# Patient Record
Sex: MALE | Race: WHITE | NOT HISPANIC OR LATINO | Employment: OTHER | URBAN - METROPOLITAN AREA
[De-identification: names, ages, dates, MRNs, and addresses within clinical notes are randomized per-mention and may not be internally consistent; named-entity substitution may affect disease eponyms.]

---

## 2017-02-27 ENCOUNTER — OFFICE VISIT (OUTPATIENT)
Dept: INTERNAL MEDICINE | Facility: IMAGING CENTER | Age: 62
End: 2017-02-27
Payer: COMMERCIAL

## 2017-02-27 VITALS
TEMPERATURE: 98.4 F | DIASTOLIC BLOOD PRESSURE: 66 MMHG | HEIGHT: 69 IN | SYSTOLIC BLOOD PRESSURE: 120 MMHG | HEART RATE: 61 BPM | WEIGHT: 169 LBS | BODY MASS INDEX: 25.03 KG/M2 | RESPIRATION RATE: 14 BRPM | OXYGEN SATURATION: 97 %

## 2017-02-27 DIAGNOSIS — Z00.00 WELL ADULT EXAM: ICD-10-CM

## 2017-02-27 DIAGNOSIS — E06.3 AUTOIMMUNE HYPOTHYROIDISM: ICD-10-CM

## 2017-02-27 DIAGNOSIS — I25.10 CORONARY ATHEROSCLEROSIS DUE TO CALCIFIED CORONARY LESION: Primary | ICD-10-CM

## 2017-02-27 DIAGNOSIS — R93.1 AGATSTON CAC SCORE 100-199: ICD-10-CM

## 2017-02-27 DIAGNOSIS — E78.2 MIXED HYPERLIPIDEMIA WITH APOLIPOPROTEIN E3 VARIANT: ICD-10-CM

## 2017-02-27 DIAGNOSIS — I25.84 CORONARY ATHEROSCLEROSIS DUE TO CALCIFIED CORONARY LESION: Primary | ICD-10-CM

## 2017-02-27 DIAGNOSIS — E55.9 VITAMIN D DEFICIENCY: ICD-10-CM

## 2017-02-27 DIAGNOSIS — N52.8 OTHER MALE ERECTILE DYSFUNCTION: ICD-10-CM

## 2017-02-27 DIAGNOSIS — I10 ESSENTIAL HYPERTENSION: ICD-10-CM

## 2017-02-27 PROCEDURE — 99396 PREV VISIT EST AGE 40-64: CPT | Performed by: FAMILY MEDICINE

## 2017-02-27 RX ORDER — TADALAFIL 5 MG/1
5 TABLET ORAL PRN
Qty: 30 TAB | Refills: 12 | Status: SHIPPED | OUTPATIENT
Start: 2017-02-27 | End: 2017-08-04

## 2017-02-27 NOTE — MR AVS SNAPSHOT
"        Adal Dobbins   2017 1:30 PM   Office Visit   MRN: [id number]    Department:  [facility]   Dept Phone:  [phone]    Description:  Male : 1955   Provider:  Wade Urias M.D.           Reason for Visit     Hypertension     Hyperlipidemia     Coronary Artery Disease Subclinical    Medication Refill     Flu Vaccine           Allergies as of 2017     No Known Allergies      You were diagnosed with     Coronary atherosclerosis due to calcified coronary lesion   [397018]  -  Primary     Agatston CAC score 100-199   [8298430]       Other male erectile dysfunction   [4647908]       Essential hypertension   [6595409]       Mixed hyperlipidemia with apolipoprotein E3 variant   [219290]       Autoimmune hypothyroidism   [2058320]       Vitamin D deficiency   [1154066]       Well adult exam   [582227]         Vital Signs     Blood Pressure Pulse Temperature Respirations Height Weight    120/66 mmHg 61 36.9 °C (98.4 °F) 14 1.74 m (5' 8.5\") 76.658 kg (169 lb)    Body Mass Index Oxygen Saturation Smoking Status             25.32 kg/m2 97% Never Smoker          Basic Information     Date Of Birth Sex Race Ethnicity Preferred Language    1955 Male White Non- English      Your appointments     Mar 08, 2017  8:30 AM   Non Provider 1 with Su Smith R.N.   [facility] (--)    [address] [id number] [phone]           You will be receiving a confirmation call a few days before your appointment from our automated call confirmation system.              Problem List              ICD-10-CM Priority Class Noted - Resolved    ASTHMA    Unknown - Present    Psoriasis L40.9   Unknown - Present    Family history of hypertension Z82.49   Unknown - Present    History of stroke Z86.73   Unknown - Present    Mixed hyperlipidemia with apolipoprotein E3 variant E78.2   10/9/2013 - Present    Coronary atherosclerosis due to calcified " coronary lesion I25.10, I25.84   2/14/2014 - Present    Erectile dysfunction N52.9   9/18/2014 - Present    Elevated TSH R94.6   9/18/2014 - Present    Elevated CPK R74.8   9/18/2014 - Present    Elevated LFTs R79.89   9/18/2014 - Present    Autoimmune hypothyroidism E03.9   7/1/2016 - Present    Agatston CAC score 100-199 R93.1   2/27/2017 - Present    Essential hypertension I10   2/27/2017 - Present    Vitamin D deficiency E55.9   2/27/2017 - Present      Health Maintenance        Date Due Completion Dates    IMM PNEUMOCOCCAL 19-64 (ADULT) MEDIUM RISK SERIES (1 of 1 - PPSV23) 3/29/1976 ---    IMM INFLUENZA (1) 9/1/2016 11/3/2015, 11/12/2014    COLONOSCOPY 9/13/2017 9/13/2007 (Done)    Override on 9/13/2007: Done    IMM DTaP/Tdap/Td Vaccine (2 - Td) 5/1/2022 5/1/2012            Current Immunizations     Influenza Vaccine Quad Inj (Pf) 11/3/2015, 11/12/2014    SHINGLES VACCINE 9/13/2013  3:06 PM    Tdap Vaccine 5/1/2012      Below and/or attached are the medications your provider expects you to take. Review all of your home medications and newly ordered medications with your provider and/or pharmacist. Follow medication instructions as directed by your provider and/or pharmacist. Please keep your medication list with you and share with your provider. Update the information when medications are discontinued, doses are changed, or new medications (including over-the-counter products) are added; and carry medication information at all times in the event of emergency situations     Allergies:  No Known Allergies          Medications  Valid as of: February 27, 2017 -  4:00 PM    Generic Name Brand Name Tablet Size Instructions for use    ALPRAZolam (Tab) XANAX 0.5 MG Take 1 Tab by mouth at bedtime as needed for Sleep.        Aspirin (Tablet Delayed Response) ECOTRIN 81 MG Take 2 Tabs by mouth every day.        Benazepril HCl (Tab) LOTENSIN 20 MG TAKE ONE TABLET BY MOUTH EVERY DAY        Calcipotriene (Cream) DOVONEX  0.005 % Apply 2 g to affected area(s) 2 times a day.        Calcipotriene (Cream) DOVONEX 0.005 % APPLY 2 GRAMS TO AFFECTED AREA(S) TWICE DAILY        Cholecalciferol (Cap) Vitamin D 2000 UNITS Take 1 Cap by mouth every day.        Levothyroxine Sodium (Tab) SYNTHROID 75 MCG Take 1 Tab by mouth Every morning on an empty stomach.        Simvastatin (Tab) ZOCOR 40 MG TAKE ONE-HALF TABLET BY MOUTH EVERY DAY        Tadalafil (Tab) CIALIS 5 MG Take 1 Tab by mouth as needed for Erectile Dysfunction.        Tadalafil (Tab) CIALIS 5 MG Take 1 Tab by mouth as needed for Erectile Dysfunction.        Vardenafil HCl (Tab) LEVITRA 5 MG Take 1 Tab by mouth as needed.        .                 Medicines prescribed today were sent to:     [facility] [address] [place] [address] [facility] [address] [place] [id number] [facility] [address]    Phone: [phone] Fax: [phone]    Open 24 Hours?: No      Medication refill instructions:       If your prescription bottle indicates you have medication refills left, it is not necessary to call your provider’s office. Please contact your pharmacy and they will refill your medication.    If your prescription bottle indicates you do not have any refills left, you may request refills at any time through one of the following ways: The online [employer] system (except Urgent Care), by calling your provider’s office, or by asking your pharmacy to contact your provider’s office with a refill request. Medication refills are processed only during regular business hours and may not be available until the next business day. Your provider may request additional information or to have a follow-up visit with you prior to refilling your medication.   *Please Note: Medication refills are assigned a new Rx number when refilled electronically. Your pharmacy may indicate that no refills were authorized even though a new prescription for the same medication is available  at the pharmacy. Please request the medicine by name with the pharmacy before contacting your provider for a refill.        Your To Do List     Future Labs/Procedures Complete By Expires    APOLIPOPROTEIN B  As directed 2/27/2018    CBC WITH DIFFERENTIAL  As directed 2/27/2018    COMP METABOLIC PANEL  As directed 2/27/2018    CRP HIGH SENSITIVE (CARDIAC)  As directed 2/27/2018    FREE THYROXINE  As directed 2/27/2018    LIPOPROTEIN QT BLOOD BY NMR  As directed 2/28/2018    MICROALBUMIN CREAT RATIO URINE  As directed 2/28/2018    PROSTATE SPECIFIC AG SCREENING  As directed 2/27/2018    TOTAL VASCULAR SCREENING  As directed 8/27/2017    TSH  As directed 2/27/2018    VITAMIN D,25 HYDROXY  As directed 2/28/2018      Instructions    [facility] Outpatient [facility]   Medication Sig Dispense Refill   • tadalafil (CIALIS) 5 MG tablet Take 1 Tab by mouth as needed for Erectile Dysfunction. 30 Tab 12   • benazepril (LOTENSIN) 20 MG Tab TAKE ONE TABLET BY MOUTH EVERY DAY 90 Tab 0   • simvastatin (ZOCOR) 40 MG Tab TAKE ONE-HALF TABLET BY MOUTH EVERY DAY 90 Tab 0   • levothyroxine (SYNTHROID) 75 MCG Tab Take 1 Tab by mouth Every morning on an empty stomach. 90 Tab 3   • aspirin EC (ECOTRIN) 81 MG Tablet Delayed Response Take 2 Tabs by mouth every day.     • calcipotriene (DOVONEX) 0.005 % Cream APPLY 2 GRAMS TO AFFECTED AREA(S) TWICE DAILY 120 g 5   • tadalafil (CIALIS) 5 MG tablet Take 1 Tab by mouth as needed for Erectile Dysfunction. 30 Tab 12   • Cholecalciferol (VITAMIN D) 2000 UNITS Cap Take 1 Cap by mouth every day.     • calcipotriene (DOVONEX) 0.005 % Cream Apply 2 g to affected area(s) 2 times a day. 120 g 5   • vardenafil (LEVITRA) 5 MG tablet Take 1 Tab by mouth as needed. 10 Tab 12   • alprazolam (XANAX) 0.5 MG Tab Take 1 Tab by mouth at bedtime as needed for Sleep. 30 Tab 1     No current Epic-ordered facility-administered medications on file.              [email or web address] Access Code: Activation code not  generated  Current MyChart Status: Active

## 2017-02-27 NOTE — PATIENT INSTRUCTIONS
Current Outpatient Prescriptions Ordered in [facility]   Medication Sig Dispense Refill   • tadalafil (CIALIS) 5 MG tablet Take 1 Tab by mouth as needed for Erectile Dysfunction. 30 Tab 12   • benazepril (LOTENSIN) 20 MG Tab TAKE ONE TABLET BY MOUTH EVERY DAY 90 Tab 0   • simvastatin (ZOCOR) 40 MG Tab TAKE ONE-HALF TABLET BY MOUTH EVERY DAY 90 Tab 0   • levothyroxine (SYNTHROID) 75 MCG Tab Take 1 Tab by mouth Every morning on an empty stomach. 90 Tab 3   • aspirin EC (ECOTRIN) 81 MG Tablet Delayed Response Take 2 Tabs by mouth every day.     • calcipotriene (DOVONEX) 0.005 % Cream APPLY 2 GRAMS TO AFFECTED AREA(S) TWICE DAILY 120 g 5   • tadalafil (CIALIS) 5 MG tablet Take 1 Tab by mouth as needed for Erectile Dysfunction. 30 Tab 12   • Cholecalciferol (VITAMIN D) 2000 UNITS Cap Take 1 Cap by mouth every day.     • calcipotriene (DOVONEX) 0.005 % Cream Apply 2 g to affected area(s) 2 times a day. 120 g 5   • vardenafil (LEVITRA) 5 MG tablet Take 1 Tab by mouth as needed. 10 Tab 12   • alprazolam (XANAX) 0.5 MG Tab Take 1 Tab by mouth at bedtime as needed for Sleep. 30 Tab 1     No current Epic-ordered facility-administered medications on file.

## 2017-02-27 NOTE — PROGRESS NOTES
SUBJECTIVE:    Chief Complaint   Patient presents with   • Hypertension   • Hyperlipidemia   • Coronary Artery Disease     Subclinical   • Medication Refill   • Flu Vaccine       Adal Dobbins is a 61 y.o. male,   Established Patient   PROBLEM #1-HISTORY OF PRESENT ILLNESS  Existing Problem, but requiring re-evaluation  PATIENT STATEMENT OF PROBLEM - Subclinical CAD, Dyslipidemia, HTN  ONSET - years  COURSE - asymptomatic. No CVD/CeVD event history. Doing very well clinically. Normal weight. Normal BP on rx. Due for labs and TVS w/ cIMT.   INTENSITY/STATUS/LOCATION/RADIATION - stable  AGGRAVATORS - Multifactorial   RELIEVERS - meds and Therapeutic Lifestyle Changes   TREATMENTS/COMPLIANCE/@GOAL? - same/ good/ clinically yes    PROBLEM #2-HISTORY OF PRESENT ILLNESS  New Problem  PATIENT STATEMENT OF PROBLEM - Due for refill of cialis.     PROBLEM #3-HISTORY OF PRESENT ILLNESS  New Problem  PATIENT STATEMENT OF PROBLEM - Due for annual FLU vaccine.   ONSET - counseled.     No Known Allergies    Patient Active Problem List    Diagnosis Date Noted   • Agatston CAC score 100-199 02/27/2017   • Essential hypertension 02/27/2017   • Vitamin D deficiency 02/27/2017   • Autoimmune hypothyroidism 07/01/2016   • Erectile dysfunction 09/18/2014   • Elevated TSH 09/18/2014   • Elevated CPK 09/18/2014   • Elevated LFTs 09/18/2014   • Coronary atherosclerosis due to calcified coronary lesion 02/14/2014   • Mixed hyperlipidemia with apolipoprotein E3 variant 10/09/2013   • ASTHMA    • Psoriasis    • Family history of hypertension    • History of stroke        Outpatient Encounter Prescriptions as of 2/27/2017   Medication Sig Dispense Refill   • tadalafil (CIALIS) 5 MG tablet Take 1 Tab by mouth as needed for Erectile Dysfunction. 30 Tab 12   • benazepril (LOTENSIN) 20 MG Tab TAKE ONE TABLET BY MOUTH EVERY DAY 90 Tab 0   • simvastatin (ZOCOR) 40 MG Tab TAKE ONE-HALF TABLET BY MOUTH EVERY DAY 90 Tab 0   • levothyroxine (SYNTHROID) 75  "MCG Tab Take 1 Tab by mouth Every morning on an empty stomach. 90 Tab 3   • aspirin EC (ECOTRIN) 81 MG Tablet Delayed Response Take 2 Tabs by mouth every day.     • calcipotriene (DOVONEX) 0.005 % Cream APPLY 2 GRAMS TO AFFECTED AREA(S) TWICE DAILY 120 g 5   • tadalafil (CIALIS) 5 MG tablet Take 1 Tab by mouth as needed for Erectile Dysfunction. 30 Tab 12   • Cholecalciferol (VITAMIN D) 2000 UNITS Cap Take 1 Cap by mouth every day.     • calcipotriene (DOVONEX) 0.005 % Cream Apply 2 g to affected area(s) 2 times a day. 120 g 5   • vardenafil (LEVITRA) 5 MG tablet Take 1 Tab by mouth as needed. 10 Tab 12   • alprazolam (XANAX) 0.5 MG Tab Take 1 Tab by mouth at bedtime as needed for Sleep. 30 Tab 1   • [DISCONTINUED] scopolamine (TRANSDERM-SCOP) 1.5 MG PT72 Apply 1 Patch to skin as directed every 72 hours. 4 Patch 3     No facility-administered encounter medications on file as of 2/27/2017.       Social History   Substance Use Topics   • Smoking status: Never Smoker    • Smokeless tobacco: Never Used   • Alcohol Use: Not on file       Family History   Problem Relation Age of Onset   • Hypertension Mother    • Other Father 45     Viral encephalitis       Patient's Past, Social, and Family History reviewed and updated by me in EPIC today.    REVIEW OF SYMPTOMS:               Pertinent Positives as above.    All other systems reviewed and negative.     OBJECTIVE:    /66 mmHg  Pulse 61  Temp(Src) 36.9 °C (98.4 °F)  Resp 14  Ht 1.74 m (5' 8.5\")  Wt 76.658 kg (169 lb)  BMI 25.32 kg/m2  SpO2 97%  Body mass index is 25.32 kg/(m^2).    Well developed, well nourished male, no acute distress, non-ill appearing. Comfortable, appears stated age, pleasant and cooperative  HEAD: atraumatic, normocephalic   EYES: Conjunctiva normal, EOMI, PERRLA, acuity grossly intact.   EARS/NOSE/THROAT: TM's normal, no SSX of infection, no perforation, no hemotympanum, acuity grossly intact. Oropharynx: benign, no lesions noted. Nares: " benign.   NECK: supple, no adenopathy, no thyromegaly or nodules, no JVD, no carotid bruits.   CHEST/LUNGS: clear to auscultation and percussion bilaterally. No adventitious breath sounds.   CARDIOVASCULAR: regular rate and rhythm, no murmur. PMI not displaced. Good central and peripheral pulses.   BACK: no CVA tenderness.   ABDOMEN: soft, non-tender, non-distended, no masses, no hepatosplenomegaly. Normal active bowel tones.   : deferred.   Rectal: deferred.   Extremities: warm/well-perfused, no cyanosis, clubbing, or edema.   SKIN: clear, unbroken, no rashes, normal turgor.   Neuro: Mental Status: Alert and Oriented x 3. CN II-XII grossly intact. Gait normal. Non-focal, intact. Normal strength, sensation    ASSESSMENT:    1. Coronary atherosclerosis due to calcified coronary lesion  TOTAL VASCULAR SCREENING   2. Agatston CAC score 100-199  TOTAL VASCULAR SCREENING   3. Other male erectile dysfunction  tadalafil (CIALIS) 5 MG tablet   4. Essential hypertension     5. Mixed hyperlipidemia with apolipoprotein E3 variant  LIPOPROTEIN QT BLOOD BY NMR    CBC WITH DIFFERENTIAL    COMP METABOLIC PANEL    MICROALBUMIN CREAT RATIO URINE    CRP HIGH SENSITIVE (CARDIAC)    APOLIPOPROTEIN B   6. Autoimmune hypothyroidism  TSH    FREE THYROXINE   7. Vitamin D deficiency  VITAMIN D,25 HYDROXY   8. Well adult exam  PROSTATE SPECIFIC AG SCREENING       PLAN:    Total Face-to-Face time spent with patient: 30 minutes  Amount of time spent counseling patient and/or coordinating care: 20 minutes    The nature of patient counseling as below:  -Patient Education, including below topics:  -Differential Diagnoses and treatment options discussed  -Risks, benefits, alternatives discussed  -Health Maintenance Exam issues discussed  -Exercise  -Dietary recommendations discussed  -Therapeutic Lifestyle Changes discussed    The nature of coordination of care as below:  -Medications added: none. Refills as above  -Medications discontinued:  none  -Medications adjusted: none  -Continue (other) present chronic medications  -Labs: D/NMR/TSH/FreeT4/cbc/cmp/uacr/psa/hsCRP/apoB  -Referrals: none  -Other: TVS w/ cIMT   FLU vaccine @ pharmacy  -Seek medical attention immediately if worse    FOLLOW-UP:  -in 6 months  -and sooner if test/consult results warrant  And for Health Care Maintenance Exams  And as needed.

## 2017-03-20 RX ORDER — BENAZEPRIL HYDROCHLORIDE 20 MG/1
TABLET ORAL
Qty: 90 TAB | Refills: 3 | Status: SHIPPED | OUTPATIENT
Start: 2017-03-20 | End: 2018-03-17 | Stop reason: SDUPTHER

## 2017-03-22 ENCOUNTER — HOSPITAL ENCOUNTER (OUTPATIENT)
Facility: MEDICAL CENTER | Age: 62
End: 2017-03-22
Attending: FAMILY MEDICINE
Payer: COMMERCIAL

## 2017-03-22 ENCOUNTER — NON-PROVIDER VISIT (OUTPATIENT)
Dept: INTERNAL MEDICINE | Facility: IMAGING CENTER | Age: 62
End: 2017-03-22
Payer: COMMERCIAL

## 2017-03-22 DIAGNOSIS — E06.3 AUTOIMMUNE HYPOTHYROIDISM: ICD-10-CM

## 2017-03-22 DIAGNOSIS — E78.2 MIXED HYPERLIPIDEMIA WITH APOLIPOPROTEIN E3 VARIANT: ICD-10-CM

## 2017-03-22 DIAGNOSIS — E55.9 VITAMIN D DEFICIENCY: ICD-10-CM

## 2017-03-22 DIAGNOSIS — Z01.89 ENCOUNTER FOR ROUTINE LABORATORY TESTING: Primary | ICD-10-CM

## 2017-03-22 DIAGNOSIS — Z00.00 WELL ADULT EXAM: ICD-10-CM

## 2017-03-22 LAB
25(OH)D3 SERPL-MCNC: 28 NG/ML (ref 30–100)
ALBUMIN SERPL BCP-MCNC: 4 G/DL (ref 3.2–4.9)
ALBUMIN/GLOB SERPL: 1.2 G/DL
ALP SERPL-CCNC: 41 U/L (ref 30–99)
ALT SERPL-CCNC: 25 U/L (ref 2–50)
ANION GAP SERPL CALC-SCNC: 10 MMOL/L (ref 0–11.9)
AST SERPL-CCNC: 32 U/L (ref 12–45)
BASOPHILS # BLD AUTO: 0.07 K/UL (ref 0–0.12)
BASOPHILS NFR BLD AUTO: 1.4 % (ref 0–1.8)
BILIRUB SERPL-MCNC: 0.6 MG/DL (ref 0.1–1.5)
BUN SERPL-MCNC: 23 MG/DL (ref 8–22)
CALCIUM SERPL-MCNC: 9.5 MG/DL (ref 8.5–10.5)
CHLORIDE SERPL-SCNC: 106 MMOL/L (ref 96–112)
CO2 SERPL-SCNC: 25 MMOL/L (ref 20–33)
CREAT SERPL-MCNC: 0.9 MG/DL (ref 0.5–1.4)
CREAT UR-MCNC: 104.7 MG/DL
CRP SERPL HS-MCNC: 0.9 MG/L (ref 0–7.5)
EOSINOPHIL # BLD: 0.24 K/UL (ref 0–0.51)
EOSINOPHIL NFR BLD AUTO: 4.7 % (ref 0–6.9)
ERYTHROCYTE [DISTWIDTH] IN BLOOD BY AUTOMATED COUNT: 41.6 FL (ref 35.9–50)
GLOBULIN SER CALC-MCNC: 3.3 G/DL (ref 1.9–3.5)
GLUCOSE SERPL-MCNC: 90 MG/DL (ref 65–99)
HCT VFR BLD AUTO: 45.9 % (ref 42–52)
HGB BLD-MCNC: 15.3 G/DL (ref 14–18)
IMM GRANULOCYTES # BLD AUTO: 0.01 K/UL (ref 0–0.11)
IMM GRANULOCYTES NFR BLD AUTO: 0.2 % (ref 0–0.9)
LYMPHOCYTES # BLD: 1.86 K/UL (ref 1–4.8)
LYMPHOCYTES NFR BLD AUTO: 36 % (ref 22–41)
MCH RBC QN AUTO: 29.9 PG (ref 27–33)
MCHC RBC AUTO-ENTMCNC: 33.3 G/DL (ref 33.7–35.3)
MCV RBC AUTO: 89.6 FL (ref 81.4–97.8)
MICROALBUMIN UR-MCNC: <0.7 MG/DL
MICROALBUMIN/CREAT UR: NORMAL MG/G (ref 0–30)
MONOCYTES # BLD: 0.51 K/UL (ref 0–0.85)
MONOCYTES NFR BLD AUTO: 9.9 % (ref 0–13.4)
NEUTROPHILS # BLD: 2.47 K/UL (ref 1.82–7.42)
NEUTROPHILS NFR BLD AUTO: 47.8 % (ref 44–72)
NRBC # BLD AUTO: 0 K/UL
NRBC BLD-RTO: 0 /100 WBC
PLATELET # BLD AUTO: 291 K/UL (ref 164–446)
PMV BLD AUTO: 11.6 FL (ref 9–12.9)
POTASSIUM SERPL-SCNC: 4.4 MMOL/L (ref 3.6–5.5)
PROT SERPL-MCNC: 7.3 G/DL (ref 6–8.2)
PSA SERPL DL<=0.01 NG/ML-MCNC: 3.12 NG/ML (ref 0–4)
RBC # BLD AUTO: 5.12 M/UL (ref 4.7–6.1)
SODIUM SERPL-SCNC: 141 MMOL/L (ref 135–145)
T4 FREE SERPL-MCNC: 0.99 NG/DL (ref 0.53–1.43)
TSH SERPL DL<=0.005 MIU/L-ACNC: 2.95 UIU/ML (ref 0.3–3.7)
WBC # BLD AUTO: 5.2 K/UL (ref 4.8–10.8)

## 2017-03-22 PROCEDURE — 80061 LIPID PANEL: CPT

## 2017-03-22 PROCEDURE — 80053 COMPREHEN METABOLIC PANEL: CPT

## 2017-03-22 PROCEDURE — 82306 VITAMIN D 25 HYDROXY: CPT

## 2017-03-22 PROCEDURE — 82043 UR ALBUMIN QUANTITATIVE: CPT

## 2017-03-22 PROCEDURE — 82172 ASSAY OF APOLIPOPROTEIN: CPT

## 2017-03-22 PROCEDURE — 84153 ASSAY OF PSA TOTAL: CPT

## 2017-03-22 PROCEDURE — 84439 ASSAY OF FREE THYROXINE: CPT

## 2017-03-22 PROCEDURE — 84443 ASSAY THYROID STIM HORMONE: CPT

## 2017-03-22 PROCEDURE — 86141 C-REACTIVE PROTEIN HS: CPT

## 2017-03-22 PROCEDURE — 82570 ASSAY OF URINE CREATININE: CPT

## 2017-03-22 PROCEDURE — 83704 LIPOPROTEIN BLD QUAN PART: CPT

## 2017-03-22 PROCEDURE — 85025 COMPLETE CBC W/AUTO DIFF WBC: CPT

## 2017-03-24 LAB — APO B100 SERPL-MCNC: 101 MG/DL (ref 55–140)

## 2017-03-26 LAB
CHOLEST SERPL-MCNC: 194 MG/DL (ref 100–199)
HDL PARTICAL NO Q4363: 34.8 UMOL/L
HDL SERPL QN: 9 NM
HDLC SERPL-MCNC: 55 MG/DL
HLD.LARGE SERPL-SCNC: 6.3 UMOL/L
LDL MED SERPL QN: 21.1 NM
LDL SERPL QN: 21.1 NM
LDL SERPL-SCNC: 1749 NMOL/L
LDL SMALL SERPL-SCNC: 680 NMOL/L
LDL SMALL SERPL-SCNC: 680 NMOL/L
LDLC SERPL CALC-MCNC: 121 MG/DL (ref 0–99)
LP IR SCORE Q4364: 41
TRIGL SERPL-MCNC: 91 MG/DL (ref 0–149)
VLDL LARGE SERPL-SCNC: 1.1 NMOL/L
VLDL SERPL QN: 50.2 NM

## 2017-04-14 ENCOUNTER — HOSPITAL ENCOUNTER (OUTPATIENT)
Dept: RADIOLOGY | Facility: MEDICAL CENTER | Age: 62
End: 2017-04-14
Attending: FAMILY MEDICINE
Payer: COMMERCIAL

## 2017-04-14 DIAGNOSIS — I25.10 CORONARY ATHEROSCLEROSIS DUE TO CALCIFIED CORONARY LESION: ICD-10-CM

## 2017-04-14 DIAGNOSIS — I25.84 CORONARY ATHEROSCLEROSIS DUE TO CALCIFIED CORONARY LESION: ICD-10-CM

## 2017-04-14 DIAGNOSIS — R93.1 AGATSTON CAC SCORE 100-199: ICD-10-CM

## 2017-04-14 PROCEDURE — 306734 HCHG ADVANCED VASCULAR SCREENING

## 2017-04-19 PROBLEM — I65.23 ATHEROSCLEROSIS OF BOTH CAROTID ARTERIES: Status: ACTIVE | Noted: 2017-04-19

## 2017-04-19 PROBLEM — I70.0 ABDOMINAL AORTIC ATHEROSCLEROSIS (HCC): Status: ACTIVE | Noted: 2017-04-19

## 2017-07-11 ENCOUNTER — OFFICE VISIT (OUTPATIENT)
Dept: OTHER | Facility: MEDICAL CENTER | Age: 62
End: 2017-07-11
Payer: COMMERCIAL

## 2017-07-11 VITALS
HEART RATE: 55 BPM | BODY MASS INDEX: 25.91 KG/M2 | TEMPERATURE: 97.3 F | HEIGHT: 68 IN | WEIGHT: 171 LBS | SYSTOLIC BLOOD PRESSURE: 122 MMHG | RESPIRATION RATE: 14 BRPM | OXYGEN SATURATION: 97 % | DIASTOLIC BLOOD PRESSURE: 89 MMHG

## 2017-07-11 DIAGNOSIS — R13.10 DYSPHAGIA, UNSPECIFIED TYPE: Primary | ICD-10-CM

## 2017-07-11 DIAGNOSIS — F17.290 CIGAR SMOKER MOTIVATED TO QUIT: ICD-10-CM

## 2017-07-11 PROCEDURE — 99214 OFFICE O/P EST MOD 30 MIN: CPT | Performed by: FAMILY MEDICINE

## 2017-07-11 RX ORDER — ESOMEPRAZOLE MAGNESIUM 40 MG/1
40 CAPSULE, DELAYED RELEASE ORAL DAILY
Qty: 30 CAP | Refills: 3 | Status: SHIPPED | OUTPATIENT
Start: 2017-07-11 | End: 2017-11-14

## 2017-07-11 NOTE — Clinical Note
July 11, 2017         VIVIANE Hummel  [facility]  Fax: [phone]        Patient: Adal Dobbins   MR Number: [id number]   YOB: 1955   Date of Visit: 7/11/2017           Dear VIVIANE Hummel,    I am referring my patient, Adal Dobbins, to you for evaluation of Dysphagia of one month's duration.  Please see my attached progress note from today.  He has had 2 fairly severe episodes of dysphagia of food in the past month.  He has some PUD risk factors, but minimal if any heartburn symptoms chronically.  He is scheduled to see you on the morning of 7/26/17.    He  has a past medical history of HTN (hypertension) (2011); Stroke (CMS-HCC) (1997); ASTHMA; Psoriasis; History of stroke; and Family history of hypertension. His  has past surgical history that includes other (age 12 ). He  reports that he has been smoking Cigars.  He has never used smokeless tobacco. He reports that he drinks about 8.4 oz of alcohol per week. He reports that he does not use illicit drugs.    He has a current medication list which includes the following prescription(s): esomeprazole, calcipotriene, simvastatin, benazepril, tadalafil, tadalafil, levothyroxine, vitamin d, calcipotriene, vardenafil, aspirin ec, and alprazolam. He has No Known Allergies.    I appreciate your assistance in his care and look forward to your findings and recommendations.    Sincerely,                        Wade Urias M.D.

## 2017-07-11 NOTE — PATIENT INSTRUCTIONS
Current Outpatient Prescriptions Ordered in [facility]   Medication Sig Dispense Refill   • esomeprazole (NEXIUM) 40 MG delayed-release capsule Take 1 Cap by mouth every day. 30 Cap 3   • calcipotriene (DOVONEX) 0.005 % Cream APPLY 2 GRAMS EXTERNALLY TO THE AFFECTED AREA TWICE DAILY 120 g 0   • simvastatin (ZOCOR) 40 MG Tab TAKE 1/2 TABLET BY MOUTH EVERY DAY 90 Tab 4   • benazepril (LOTENSIN) 20 MG Tab TAKE 1 TABLET BY MOUTH EVERY DAY 90 Tab 3   • tadalafil (CIALIS) 5 MG tablet Take 1 Tab by mouth as needed for Erectile Dysfunction. 30 Tab 12   • tadalafil (CIALIS) 5 MG tablet Take 1 Tab by mouth as needed for Erectile Dysfunction. 30 Tab 12   • levothyroxine (SYNTHROID) 75 MCG Tab Take 1 Tab by mouth Every morning on an empty stomach. 90 Tab 3   • Cholecalciferol (VITAMIN D) 2000 UNITS Cap Take 1 Cap by mouth every day.     • calcipotriene (DOVONEX) 0.005 % Cream Apply 2 g to affected area(s) 2 times a day. 120 g 5   • vardenafil (LEVITRA) 5 MG tablet Take 1 Tab by mouth as needed. 10 Tab 12   • aspirin EC (ECOTRIN) 81 MG Tablet Delayed Response Take 2 Tabs by mouth every day.     • alprazolam (XANAX) 0.5 MG Tab Take 1 Tab by mouth at bedtime as needed for Sleep. 30 Tab 1     No current Epic-ordered facility-administered medications on file.

## 2017-07-11 NOTE — PROGRESS NOTES
"SUBJECTIVE:    Chief Complaint   Patient presents with   • Dysphagia       Adal Dobbins is a 62 y.o. male,   New Patient to my [facility] (but previously established at my [facility].)    PROBLEM #1-HISTORY OF PRESENT ILLNESS  New Problem  PATIENT STATEMENT OF PROBLEM - Dysphagia  ONSET - 1 month  COURSE - he ate beef/steak meal, in fairly big \"chunks\", and food got \"stuck\" in his esophagus, here at work.  He felt poorly until 20 seconds later when he coughed up the food item.  He feels this was more of an esophagus issue than a tracheal issue.  Felt fine, normal eating routine, until yesterday, again at hospital.  He peeled an orange, and ate a third of orange in one mouthful, and again food got \"stuck, and then 15 seconds later\" he coughed this up.  He felt some acidic \"heartburn\" subsequently.  No trouble since yesterday, never any trouble if he chews his food properly.  He may have some very intermittent mild \"heartburn\" over the years, but never significant enough to take anything or seek care.  PUD risk factors: he drinks 1-2 drinks of ETOH per day, one cigar per week, stress as a surgeon, 3-4 cups of caffieneated coffee per day, and 1 diet coke per day, and no known trigger foods.   INTENSITY/STATUS/LOCATION/RADIATION - severe during episodes/ fine presently  AGGRAVATORS - as above. GERD?  RELIEVERS - none  TREATMENTS/COMPLIANCE/@GOAL? - none/ na/ no     PROBLEM #2-HISTORY OF PRESENT ILLNESS  New Problem  PATIENT STATEMENT OF PROBLEM - It has been 10 years since first screening COLONOSCOPY, which was normal per patient.     No Known Allergies    Patient Active Problem List    Diagnosis Date Noted   • Abdominal aortic atherosclerosis (CMS-HCC) 04/19/2017   • Atherosclerosis of both carotid arteries, mild, per Ultrasound 04/19/2017   • Agatston CAC score 100-199 02/27/2017   • Essential hypertension 02/27/2017   • Vitamin D deficiency 02/27/2017   • Autoimmune " hypothyroidism 07/01/2016   • Erectile dysfunction 09/18/2014   • Elevated TSH 09/18/2014   • Elevated CPK 09/18/2014   • Elevated LFTs 09/18/2014   • Coronary atherosclerosis due to calcified coronary lesion 02/14/2014   • Mixed hyperlipidemia with apolipoprotein E3 variant 10/09/2013   • ASTHMA    • Psoriasis    • Family history of hypertension    • History of stroke        Outpatient Encounter Prescriptions as of 7/11/2017   Medication Sig Dispense Refill   • esomeprazole (NEXIUM) 40 MG delayed-release capsule Take 1 Cap by mouth every day. 30 Cap 3   • calcipotriene (DOVONEX) 0.005 % Cream APPLY 2 GRAMS EXTERNALLY TO THE AFFECTED AREA TWICE DAILY 120 g 0   • simvastatin (ZOCOR) 40 MG Tab TAKE 1/2 TABLET BY MOUTH EVERY DAY 90 Tab 4   • benazepril (LOTENSIN) 20 MG Tab TAKE 1 TABLET BY MOUTH EVERY DAY 90 Tab 3   • tadalafil (CIALIS) 5 MG tablet Take 1 Tab by mouth as needed for Erectile Dysfunction. 30 Tab 12   • tadalafil (CIALIS) 5 MG tablet Take 1 Tab by mouth as needed for Erectile Dysfunction. 30 Tab 12   • levothyroxine (SYNTHROID) 75 MCG Tab Take 1 Tab by mouth Every morning on an empty stomach. 90 Tab 3   • Cholecalciferol (VITAMIN D) 2000 UNITS Cap Take 1 Cap by mouth every day.     • calcipotriene (DOVONEX) 0.005 % Cream Apply 2 g to affected area(s) 2 times a day. 120 g 5   • vardenafil (LEVITRA) 5 MG tablet Take 1 Tab by mouth as needed. 10 Tab 12   • aspirin EC (ECOTRIN) 81 MG Tablet Delayed Response Take 2 Tabs by mouth every day.     • alprazolam (XANAX) 0.5 MG Tab Take 1 Tab by mouth at bedtime as needed for Sleep. 30 Tab 1     No facility-administered encounter medications on file as of 7/11/2017.       Social History   Substance Use Topics   • Smoking status: Light Tobacco Smoker     Types: Cigars   • Smokeless tobacco: Never Used      Comment: 1 cigar per week   • Alcohol Use: 8.4 oz/week     14 Standard drinks or equivalent per week       Family History   Problem Relation Age of Onset   •  "Hypertension Mother    • Other Father 45     Viral encephalitis       Patient's Past, Social, and Family History reviewed and updated by me in EPIC today.    REVIEW OF SYMPTOMS:               Pertinent Positives as above.    All other systems reviewed and negative.     OBJECTIVE:    /89 mmHg  Pulse 55  Temp(Src) 36.3 °C (97.3 °F)  Resp 14  Ht 1.727 m (5' 8\")  Wt 77.565 kg (171 lb)  BMI 26.01 kg/m2  SpO2 97%  Body mass index is 26.01 kg/(m^2).    Well developed, well nourished male, no acute distress, non-ill appearing. Comfortable, appears stated age, pleasant and cooperative  HEAD: atraumatic, normocephalic   EYES: Conjunctiva normal, extra-occular movements intact, PERRLA, acuity grossly intact.   EARS/NOSE/THROAT: TM's normal, no signs or symptoms of infection, no perforation, no hemotympanum, acuity grossly intact. Oropharynx: benign, no lesions noted. Nares: benign.   NECK: supple, no adenopathy, no thyromegaly or nodules, no jugular vein distention, no carotid bruits.   CHEST/LUNGS: clear to auscultation and percussion bilaterally. No adventitious breath sounds.   CARDIOVASCULAR: regular rate and rhythm, no murmur. Point of maximum intensity not displaced. Good central and peripheral pulses.   BACK: no CVA tenderness.   ABDOMEN: soft, non-tender, non-distended, no masses, no hepatosplenomegaly. Normal active bowel tones.   : deferred.   Rectal: deferred.   Extremities: warm/well-perfused, no cyanosis, clubbing, or edema.   SKIN: clear, unbroken, no rashes, normal turgor.   Neuro: Mental Status: Alert and Oriented x 3. CN II-XII grossly intact. Gait normal. Non-focal, intact. Normal strength, sensation    ASSESSMENT:    1. Dysphagia, unspecified type  REFERRAL TO GASTROENTEROLOGY    esomeprazole (NEXIUM) 40 MG delayed-release capsule   2. Cigar smoker motivated to quit         PLAN:    Total Face-to-Face time spent with patient: 45 minutes  Amount of time spent counseling patient and/or " coordinating care: 30 minutes    The nature of patient counseling as below:  -Patient Education, including below topics:  -Differential Diagnoses and treatment options discussed  -Risks, benefits, alternatives discussed  -Significantly reduce, or preferably QUIT cigars.  Avoid ETOH and Caffeine at least until seen by GI provider  -Therapeutic Lifestyle Changes discussed    The nature of coordination of care as below:  -Medications added: Nexium 40 mg/day  -Medications discontinued: none  -Medications adjusted: none  -Continue (other) present chronic medications  -Referrals: Patient is scheduled to see VIVIANE Hummel on 7/26/17 at 7:30 am check-in  -Other: In addition to whatever work-up for dysphagia is recommended by GI provider, patient is also due for every 10 year screening COLONOSCOPY.   -Seek medical attention immediately if worse    FOLLOW-UP:  -after GI work-up complete  -and sooner if test/consult results warrant  And for Health Care Maintenance Exams  And as needed.

## 2017-07-17 RX ORDER — CALCIPOTRIENE 50 UG/G
CREAM TOPICAL
Qty: 120 G | Refills: 6 | Status: SHIPPED | OUTPATIENT
Start: 2017-07-17 | End: 2017-08-04

## 2017-08-04 ENCOUNTER — OFFICE VISIT (OUTPATIENT)
Dept: INTERNAL MEDICINE | Facility: IMAGING CENTER | Age: 62
End: 2017-08-04
Payer: COMMERCIAL

## 2017-08-04 VITALS
OXYGEN SATURATION: 96 % | TEMPERATURE: 98.2 F | HEIGHT: 69 IN | RESPIRATION RATE: 14 BRPM | SYSTOLIC BLOOD PRESSURE: 120 MMHG | WEIGHT: 169 LBS | HEART RATE: 73 BPM | BODY MASS INDEX: 25.03 KG/M2 | DIASTOLIC BLOOD PRESSURE: 62 MMHG

## 2017-08-04 DIAGNOSIS — H92.02 LEFT EAR PAIN: ICD-10-CM

## 2017-08-04 PROCEDURE — 99213 OFFICE O/P EST LOW 20 MIN: CPT | Performed by: FAMILY MEDICINE

## 2017-08-04 NOTE — MR AVS SNAPSHOT
"        Adal Dobbins   2017 1:00 PM   Office Visit   MRN: [id number]    [facility]   Dept Phone:  [phone]    Description:  Male : 1955   Provider:  Janet Gray M.D.           Reason for Visit     Ear Fullness bilateral, L>R      Allergies as of 2017     No Known Allergies      You were diagnosed with     Left ear pain   [[id number]]         Vital Signs     Blood Pressure Pulse Temperature Respirations Height Weight    120/62 mmHg 73 36.8 °C (98.2 °F) 14 1.74 m (5' 8.5\") 76.658 kg (169 lb)    Body Mass Index Oxygen Saturation Smoking Status             25.32 kg/m2 96% Light Tobacco Smoker         Basic Information     Date Of Birth Sex Race Ethnicity Preferred Language    1955 Male White Non- English      Problem List              ICD-10-CM Priority Class Noted - Resolved    ASTHMA    Unknown - Present    Psoriasis L40.9   Unknown - Present    Family history of hypertension Z82.49   Unknown - Present    History of stroke Z86.73   Unknown - Present    Mixed hyperlipidemia with apolipoprotein E3 variant E78.2   10/9/2013 - Present    Coronary atherosclerosis due to calcified coronary lesion I25.10, I25.84   2014 - Present    Erectile dysfunction N52.9   2014 - Present    Elevated TSH R94.6   2014 - Present    Elevated CPK R74.8   2014 - Present    Elevated LFTs R94.5   2014 - Present    Autoimmune hypothyroidism E03.9   2016 - Present    Agatston CAC score 100-199 R93.1   2017 - Present    Essential hypertension I10   2017 - Present    Vitamin D deficiency E55.9   2017 - Present    Abdominal aortic atherosclerosis (CMS-HCC) I70.0   2017 - Present    Atherosclerosis of both carotid arteries, mild, per Ultrasound I65.23   2017 - Present      Health Maintenance        Date Due Completion Dates    IMM PNEUMOCOCCAL 19-64 (ADULT) MEDIUM RISK SERIES (1 of 1 - PPSV23) 3/29/1976 ---    IMM INFLUENZA (1) 2017 " 11/3/2015, 11/12/2014    COLONOSCOPY 9/13/2017 9/13/2007 (Done)    Override on 9/13/2007: Done    IMM DTaP/Tdap/Td Vaccine (2 - Td) 5/1/2022 5/1/2012            Current Immunizations     Influenza Vaccine Quad Inj (Pf) 11/3/2015, 11/12/2014    SHINGLES VACCINE 9/13/2013  3:06 PM    Tdap Vaccine 5/1/2012      Below and/or attached are the medications your provider expects you to take. Review all of your home medications and newly ordered medications with your provider and/or pharmacist. Follow medication instructions as directed by your provider and/or pharmacist. Please keep your medication list with you and share with your provider. Update the information when medications are discontinued, doses are changed, or new medications (including over-the-counter products) are added; and carry medication information at all times in the event of emergency situations     Allergies:  No Known Allergies          Medications  Valid as of: August 04, 2017 -  3:24 PM    Generic Name Brand Name Tablet Size Instructions for use    ALPRAZolam (Tab) XANAX 0.5 MG Take 1 Tab by mouth at bedtime as needed for Sleep.        Aspirin (Tablet Delayed Response) ECOTRIN 81 MG Take 2 Tabs by mouth every day.        Benazepril HCl (Tab) LOTENSIN 20 MG TAKE 1 TABLET BY MOUTH EVERY DAY        Calcipotriene (Cream) DOVONEX 0.005 % Apply 2 g to affected area(s) 2 times a day.        Cholecalciferol (Cap) Vitamin D 2000 UNITS Take 1 Cap by mouth every day.        Esomeprazole Magnesium (CAPSULE DELAYED RELEASE) NEXIUM 40 MG Take 1 Cap by mouth every day.        Levothyroxine Sodium (Tab) SYNTHROID 75 MCG Take 1 Tab by mouth Every morning on an empty stomach.        Simvastatin (Tab) ZOCOR 40 MG TAKE 1/2 TABLET BY MOUTH EVERY DAY        Tadalafil (Tab) CIALIS 5 MG Take 1 Tab by mouth as needed for Erectile Dysfunction.        Vardenafil HCl (Tab) LEVITRA 5 MG Take 1 Tab by mouth as needed.        .                 Medicines prescribed today were sent  to:     [id number]  [place], [place] - [address]  AT [facility]    [address] [place] [address]    Phone: [phone] Fax: [phone]    Open 24 Hours?: No      Medication refill instructions:       If your prescription bottle indicates you have medication refills left, it is not necessary to call your provider’s office. Please contact your pharmacy and they will refill your medication.    If your prescription bottle indicates you do not have any refills left, you may request refills at any time through one of the following ways: The online Microstim system (except Urgent Care), by calling your provider’s office, or by asking your pharmacy to contact your provider’s office with a refill request. Medication refills are processed only during regular business hours and may not be available until the next business day. Your provider may request additional information or to have a follow-up visit with you prior to refilling your medication.   *Please Note: Medication refills are assigned a new Rx number when refilled electronically. Your pharmacy may indicate that no refills were authorized even though a new prescription for the same medication is available at the pharmacy. Please request the medicine by name with the pharmacy before contacting your provider for a refill.           Microstim Access Code: Activation code not generated  Current Microstim Status: Active          Quit Tobacco Information     Do you want to quit using tobacco?    Quitting tobacco decreases risks of cancer, heart and lung disease, increases life expectancy, improves sense of taste and smell, and increases spending money, among other benefits.    If you are thinking about quitting, we can help.  • [facility] Quit Tobacco Program: 530.923.8378  o Program occurs weekly for four weeks and includes pharmacist consultation on products to support quitting smoking or chewing tobacco. A provider referral is  needed for pharmacist consultation.  • Tobacco Users Help Hotline: 5-800-QUIT-NOW (444-1324) or https://[place].quitlogix.org/  o Free, confidential telephone and online coaching for [place] residents. Sessions are designed on a schedule that is convenient for you. Eligible clients receive free nicotine replacement therapy.  • Nationally: www.smokefree.gov  o Information and professional assistance to support both immediate and long-term needs as you become, and remain, a non-smoker. Smokefree.gov allows you to choose the help that best fits your needs.

## 2017-08-04 NOTE — PROGRESS NOTES
"Chief Complaint   Patient presents with   • Ear Fullness     bilateral, L>R       HPI:  Patient is a 62 y.o. male established patient of Dr. Urias who presents today for evaluation of new onset left ear fullness two weeks ago while camping. He states that he has discomfort when lying on left ear when sleeping and recently had blood from his outer ear canal due to scratching it with his finger. He also has mild R ear fullness but is focused on his left side symptoms. He denies ringing in his ears/ no associated N/V/D/F or other symptoms. He does endorse that he uses his fingers (denies q tip use) to itch his ear canals frequently and thinks he may even do this in his sleep. He has a history of similar symptoms in the past due to cerumen impaction that required removal. 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     Patient Active Problem List    Diagnosis Date Noted   • Abdominal aortic atherosclerosis (CMS-HCC) 04/19/2017   • Atherosclerosis of both carotid arteries, mild, per Ultrasound 04/19/2017   • Agatston CAC score 100-199 02/27/2017   • Essential hypertension 02/27/2017   • Vitamin D deficiency 02/27/2017   • Autoimmune hypothyroidism 07/01/2016   • Erectile dysfunction 09/18/2014   • Elevated TSH 09/18/2014   • Elevated CPK 09/18/2014   • Elevated LFTs 09/18/2014   • Coronary atherosclerosis due to calcified coronary lesion 02/14/2014   • Mixed hyperlipidemia with apolipoprotein E3 variant 10/09/2013   • ASTHMA    • Psoriasis    • Family history of hypertension    • History of stroke      Past medical, surgical, family, and social history was reviewed in [facility] chart by me today.     Medications and allergies reviewed and updated in Epic chart by me today.     ROS:  Pertinent positives listed above in HPI. All other systems have been reviewed and are negative.    PE:   /62 mmHg  Pulse 73  Temp(Src) 36.8 °C (98.2 °F)  Resp 14  Ht 1.74 m (5' 8.5\")  Wt " 76.658 kg (169 lb)  BMI 25.32 kg/m2  SpO2 96%  Vital signs reviewed with patient.     Gen: Well developed; well nourished; no acute distress; age appropriate appearance; non toxic appearance  HEENT: Normocephalic; atraumatic; PEERLA b/l; sclera clear b/l; b/l external auditory canals WNL; evidence of mild redness due to trauma of b/l ear canals (area reachable with tip of finger); mild scabbing noted just inside L canal from prior trauma; b/l TM WNL with evidence of scarring of L TM; no major cerumen noted with b/l inspection; oropharynx clear; oral mucosa moist; tongue midline; dentition adequate   Neck: No adenopathy; no thyromegaly  CV: Regular rate and rhythm; S1/ S2 present; no murmur, gallop or rub noted  Pulm: No respiratory distress; clear to ascultation b/l; no wheezing or stridor noted b/l  Abd: Adequate bowel sounds noted; soft and nontender; no rebound, rigidity, nor distention  Skin: Warm and dry; no rashes noted   Neuro: No focal deficits noted; no gross hearing deficit noted when speaking with him today  Psych: AAOx4; mood and affect are appropriate    A/P:  1. Left ear pain  No signs of infection noted/ probable eustachian tube dysfunction with imposed canal trauma from his fingertip. I offered a medrol dose pack which he did not think was necessary. I then recommended flonase each nostril BID to see if symptoms would be relieved/ trying to stay in humidified environment. He will try this and let us or Dr. Urias know if symptoms change or worsen.     Pt is to maintain regular f/u with Dr. Urias for his health care needs and is aware that I am available as needed to assist him when Dr. Urias is out of the office.

## 2017-10-09 DIAGNOSIS — N52.8 OTHER MALE ERECTILE DYSFUNCTION: Primary | ICD-10-CM

## 2017-10-09 PROBLEM — K63.5 COLON POLYP: Status: ACTIVE | Noted: 2017-10-09

## 2017-10-09 PROBLEM — K29.50 CHRONIC GASTRITIS WITHOUT BLEEDING: Status: ACTIVE | Noted: 2017-10-09

## 2017-10-09 RX ORDER — TADALAFIL 5 MG/1
5 TABLET ORAL PRN
Qty: 30 TAB | Refills: 12 | Status: SHIPPED | OUTPATIENT
Start: 2017-10-09 | End: 2019-02-03 | Stop reason: SDUPTHER

## 2017-10-11 ENCOUNTER — NON-PROVIDER VISIT (OUTPATIENT)
Dept: OTHER | Facility: MEDICAL CENTER | Age: 62
End: 2017-10-11

## 2017-10-11 ENCOUNTER — HOSPITAL ENCOUNTER (OUTPATIENT)
Facility: MEDICAL CENTER | Age: 62
End: 2017-10-11
Attending: FAMILY MEDICINE
Payer: COMMERCIAL

## 2017-10-11 DIAGNOSIS — Z00.00 WELL ADULT EXAM: ICD-10-CM

## 2017-10-11 DIAGNOSIS — Z23 NEED FOR INFLUENZA VACCINATION: ICD-10-CM

## 2017-10-11 DIAGNOSIS — Z00.01 ENCOUNTER FOR WELL ADULT EXAM WITH ABNORMAL FINDINGS: ICD-10-CM

## 2017-10-11 LAB
APPEARANCE UR: CLEAR
BILIRUB UR QL STRIP.AUTO: NEGATIVE
COLOR UR: YELLOW
CREAT UR-MCNC: 57.7 MG/DL
GLUCOSE UR STRIP.AUTO-MCNC: NEGATIVE MG/DL
KETONES UR STRIP.AUTO-MCNC: NEGATIVE MG/DL
LEUKOCYTE ESTERASE UR QL STRIP.AUTO: NEGATIVE
MICRO URNS: NORMAL
MICROALBUMIN UR-MCNC: <0.7 MG/DL
MICROALBUMIN/CREAT UR: NORMAL MG/G (ref 0–30)
NITRITE UR QL STRIP.AUTO: NEGATIVE
PH UR STRIP.AUTO: 6.5 [PH]
PROT UR QL STRIP: NEGATIVE MG/DL
RBC UR QL AUTO: NEGATIVE
SP GR UR STRIP.AUTO: 1.01
UROBILINOGEN UR STRIP.AUTO-MCNC: 0.2 MG/DL

## 2017-10-11 PROCEDURE — 90686 IIV4 VACC NO PRSV 0.5 ML IM: CPT | Mod: SPV | Performed by: FAMILY MEDICINE

## 2017-10-11 PROCEDURE — 90471 IMMUNIZATION ADMIN: CPT | Mod: SPV | Performed by: FAMILY MEDICINE

## 2017-10-12 DIAGNOSIS — Z00.00 WELL ADULT EXAM: ICD-10-CM

## 2017-10-24 ENCOUNTER — HOSPITAL ENCOUNTER (OUTPATIENT)
Dept: RADIOLOGY | Facility: MEDICAL CENTER | Age: 62
End: 2017-10-24
Attending: FAMILY MEDICINE
Payer: COMMERCIAL

## 2017-10-24 DIAGNOSIS — Z00.00 PHYSICAL EXAM, ROUTINE: ICD-10-CM

## 2017-10-24 PROCEDURE — 71020 DX-CHEST-2 VIEWS: CPT

## 2017-10-24 PROCEDURE — 76700 US EXAM ABDOM COMPLETE: CPT

## 2017-10-29 LAB — TEST NAME 95000: NORMAL

## 2017-11-10 ENCOUNTER — HOSPITAL ENCOUNTER (OUTPATIENT)
Dept: RADIOLOGY | Facility: MEDICAL CENTER | Age: 62
End: 2017-11-10
Attending: FAMILY MEDICINE
Payer: COMMERCIAL

## 2017-11-10 DIAGNOSIS — Z00.00 WELL ADULT EXAM: ICD-10-CM

## 2017-11-10 PROCEDURE — 77080 DXA BONE DENSITY AXIAL: CPT

## 2017-11-14 ENCOUNTER — OFFICE VISIT (OUTPATIENT)
Dept: OTHER | Facility: MEDICAL CENTER | Age: 62
End: 2017-11-14

## 2017-11-14 VITALS
OXYGEN SATURATION: 95 % | SYSTOLIC BLOOD PRESSURE: 122 MMHG | RESPIRATION RATE: 14 BRPM | DIASTOLIC BLOOD PRESSURE: 80 MMHG | TEMPERATURE: 97.9 F | HEART RATE: 59 BPM | HEIGHT: 69 IN | WEIGHT: 177.8 LBS | BODY MASS INDEX: 26.33 KG/M2

## 2017-11-14 DIAGNOSIS — L40.9 PSORIASIS: ICD-10-CM

## 2017-11-14 DIAGNOSIS — E06.3 AUTOIMMUNE HYPOTHYROIDISM: ICD-10-CM

## 2017-11-14 DIAGNOSIS — I25.84 CORONARY ATHEROSCLEROSIS DUE TO CALCIFIED CORONARY LESION: ICD-10-CM

## 2017-11-14 DIAGNOSIS — I45.10 RBBB: ICD-10-CM

## 2017-11-14 DIAGNOSIS — Z86.73 HISTORY OF STROKE: ICD-10-CM

## 2017-11-14 DIAGNOSIS — R74.8 ELEVATED CPK: ICD-10-CM

## 2017-11-14 DIAGNOSIS — Z00.00 WELL ADULT EXAM: Primary | ICD-10-CM

## 2017-11-14 DIAGNOSIS — E78.2 MIXED HYPERLIPIDEMIA WITH APOLIPOPROTEIN E3 VARIANT: ICD-10-CM

## 2017-11-14 DIAGNOSIS — I25.10 CORONARY ATHEROSCLEROSIS DUE TO CALCIFIED CORONARY LESION: ICD-10-CM

## 2017-11-14 DIAGNOSIS — E55.9 VITAMIN D DEFICIENCY: ICD-10-CM

## 2017-11-14 DIAGNOSIS — D18.03 LIVER HEMANGIOMA: ICD-10-CM

## 2017-11-14 DIAGNOSIS — R93.1 AGATSTON CAC SCORE 100-199: ICD-10-CM

## 2017-11-14 DIAGNOSIS — I65.23 ATHEROSCLEROSIS OF BOTH CAROTID ARTERIES: ICD-10-CM

## 2017-11-14 RX ORDER — CALCIPOTRIENE 50 UG/G
2 CREAM TOPICAL 2 TIMES DAILY
Qty: 120 G | Refills: 12 | Status: SHIPPED | OUTPATIENT
Start: 2017-11-14 | End: 2018-04-04 | Stop reason: SDUPTHER

## 2017-11-14 RX ORDER — LEVOTHYROXINE SODIUM 88 UG/1
88 TABLET ORAL
Qty: 90 TAB | Refills: 4 | Status: SHIPPED | OUTPATIENT
Start: 2017-11-14 | End: 2018-11-21 | Stop reason: SDUPTHER

## 2017-11-14 RX ORDER — ROSUVASTATIN CALCIUM 10 MG/1
10 TABLET, COATED ORAL DAILY
Qty: 90 TAB | Refills: 4 | Status: SHIPPED | OUTPATIENT
Start: 2017-11-14 | End: 2018-12-25 | Stop reason: SDUPTHER

## 2017-11-14 NOTE — PROGRESS NOTES
[facility]    EXECUTIVE HISTORY AND PHYSICAL  Performed by Dr. Wade Urias    SUBJECTIVE:    Chief Complaint   Patient presents with   • Executive Physical   • Hyperlipidemia     On Simvastatin 20 mg/day   • Other     History of Stroke from Cerebral Artery Dissection   • Other     RBBB making ETT difficult to interpret   • Hypothyroidism   • Abnormal Labs   • Vitamin D Deficiency   • Other     Right liver hemangiomas on U/S   • Coronary Artery Disease     Subclinical per CTCS 133.3 in 2014   • Other     Known mild bilateral carotid atherosclerosis       Adal Dobbins is a 62 y.o. male,   Established Patient @ [facility]    Preventive medicine issues discussed:  abuse, aspirin, dental, Alcohol, Tobacco, HIV/AIDS, injuries, mental health/depression, nutrition, exercise, occupational health, sexual behavior, UV exposure, Cancer Screening. Vaccines.      PROBLEM #1-HISTORY OF PRESENT ILLNESS  Existing Problem  PATIENT STATEMENT OF PROBLEM - Cardiovascular related issues  ONSET - discussed today  COURSE - Known Dyslipidemia, on Simvastatin 20 mg/day.  History of Stroke from Cerebral Artery dissection.  No other CVD/CeVD event history.  Known moderate CAD per CTCS of 133.3 in 2014.  Known mild bilateral carotid atherosclerosis again seen today per TVS. cIMT Vascular Age 62 = Chronological Age of 62 years.  Right Bundle Branch Block makes Exercise Stress Test essentially non-diagnostic.  CPK elevated at 425 (he is an exerciser); counseled. Current pertinent labs:   HIGH & INT RISK: LDL/Non-HDL/ApoB/LDL-P/sdLDL/ApoB:ApoA1/Sterol Hyperabsorption/ FA Imbalance/CK(425)/D(30)   Counseled  INTENSITY/STATUS/LOCATION/RADIATION - variable/ present  AGGRAVATORS - Multifactorial   RELIEVERS - meds, mostly good Therapeutic Lifestyle Changes   TREATMENTS/COMPLIANCE/@GOAL? - same/ could improve Therapeutic Lifestyle Changes somewhat/ clinically yes    PROBLEM #2-HISTORY OF PRESENT ILLNESS  Existing  Problem  PATIENT STATEMENT OF PROBLEM - Hypothyroidism  ONSET - years  COURSE - TSH mildly elevated at 4.33. Counseled. No overt hypothyroidism symptoms presently.     PROBLEM #3-HISTORY OF PRESENT ILLNESS  New Problem  PATIENT STATEMENT OF PROBLEM - Right liver hemangiomas seen on U/S  ONSET - discussed today  COURSE - asymptomatic. Counseled.     No Known Allergies    Patient Active Problem List    Diagnosis Date Noted   • RBBB 11/16/2017   • Right Liver hemangiomas per Ultrasound 11/16/2017   • Mild Chronic inactive gastritis without bleeding (per 2017 EGD) 10/09/2017   • Colon polyp (per 2017 Colonoscopy, negative for Adenomatous change or malignancy) 10/09/2017   • Abdominal aortic atherosclerosis (CMS-HCC) 04/19/2017   • Atherosclerosis of both carotid arteries, mild, per Ultrasound 04/19/2017   • Agatston CAC score 100-199 02/27/2017   • Essential hypertension 02/27/2017   • Vitamin D deficiency 02/27/2017   • Autoimmune hypothyroidism 07/01/2016   • Erectile dysfunction 09/18/2014   • Elevated TSH 09/18/2014   • Elevated CPK 09/18/2014   • Elevated LFTs 09/18/2014   • Coronary atherosclerosis due to calcified coronary lesion 02/14/2014   • Mixed hyperlipidemia with apolipoprotein E3 variant 10/09/2013   • ASTHMA    • Psoriasis    • Family history of hypertension    • History of stroke        Current Outpatient Prescriptions on File Prior to Visit   Medication Sig Dispense Refill   • tadalafil (CIALIS) 5 MG tablet Take 1 Tab by mouth as needed for Erectile Dysfunction. 30 Tab 12   • benazepril (LOTENSIN) 20 MG Tab TAKE 1 TABLET BY MOUTH EVERY DAY 90 Tab 3   • Cholecalciferol (VITAMIN D) 2000 UNITS Cap Take 1 Cap by mouth every day.     • aspirin EC (ECOTRIN) 81 MG Tablet Delayed Response Take 2 Tabs by mouth every day.     • alprazolam (XANAX) 0.5 MG Tab Take 1 Tab by mouth at bedtime as needed for Sleep. 30 Tab 1     No current facility-administered medications on file prior to visit.        Social History  "    Social History   • Marital status:      Spouse name: Jada   • Number of children: 3   • Years of education: 24     Occupational History   • Orthopedic Surgeon      Social History Main Topics   • Smoking status: Light Tobacco Smoker     Types: Cigars   • Smokeless tobacco: Never Used      Comment: 1 cigar per week   • Alcohol use 8.4 oz/week     14 Standard drinks or equivalent per week   • Drug use: No   • Sexual activity: Yes     Partners: Female     Other Topics Concern   • Not on file     Social History Narrative   • No narrative on file       Family History   Problem Relation Age of Onset   • Hypertension Mother    • Other Father 45     Viral encephalitis       Patient's Past, Social, and Family History reviewed     REVIEW OF SYMPTOMS:               Pertinent Positives as above.    All other systems reviewed and negative.     OBJECTIVE:    /80   Pulse (!) 59   Temp 36.6 °C (97.9 °F)   Resp 14   Ht 1.74 m (5' 8.5\")   Wt 80.6 kg (177 lb 12.8 oz)   SpO2 95%   BMI 26.64 kg/m²   Body mass index is 26.64 kg/m².    Well developed, well nourished male, no acute distress, non-ill appearing. Comfortable, appears stated age, pleasant and cooperative, Alert and Oriented x 3.   HEAD: atraumatic, normocephalic   EYES: Conjunctiva normal, EOMI, PERRLA, acuity grossly intact.   EARS/NOSE/THROAT: TM's normal, no SSX of infection, no perforation, no hemotympanum, acuity grossly intact. Oropharynx: benign, no lesions noted. Nares: benign.   NECK: supple, no adenopathy, no thyromegaly or nodules, no JVD, no carotid bruits.   CHEST/LUNGS: clear to auscultation and percussion bilaterally. No adventitious breath sounds.   CARDIOVASCULAR: regular rate and rhythm, no murmur. PMI not displaced. Good central and peripheral pulses.   BACK: no CVA tenderness.   ABDOMEN: soft, non-tender, non-distended, no masses, no hepatosplenomegaly. Normal active bowel tones.   : deferred.   Rectal: deferred.   Extremities: " warm/well-perfused, no cyanosis, clubbing, or edema.   SKIN: clear, unbroken, no rashes, normal turgor.   Neuro: Mental Status: Alert and Oriented x 3. CN II-XII grossly intact. Gait normal. Non-focal, intact. Normal strength, sensation    EXERCISE STRESS TEST REPORT:    Interpreted by me    INTERPRETATION:  Patient achieved 91% of maximum predicted heart rate with physiological response in blood pressure.  Several leads with ST depression consistent with RBBB.  Also, specifically no associated ST elevation, no significant ventricular extrasystoles, no ventricular tachycardia, no new atrial fibrillation or supraventricular tachycardia, and  no new heart blocks.  Patient denied chest pain, severe dyspnea, dizziness, or ataxia.     CONCLUSION:  Right Bundle Branch Block at rest obscuring Exercise Stress Test results.     ASSESSMENT:    Encounter Diagnoses   Name Primary?   • Executive History and Physical Examination Yes   • Mixed hyperlipidemia with apolipoprotein E3 variant    • History of stroke    • RBBB    • Psoriasis    • Autoimmune hypothyroidism    • Coronary atherosclerosis due to calcified coronary lesion    • Agatston CAC score 100-199    • Atherosclerosis of both carotid arteries, mild, per Ultrasound    • Elevated CPK    • Vitamin D deficiency    • Right Liver hemangiomas per Ultrasound        PLAN:    Total Face-to-Face time spent with patient: 90 minutes  Amount of time spent counseling patient and/or coordinating care: 50 minutes    The nature of patient counseling as below:  -Patient Education  -Differential Diagnoses and treatment options discussed  -Risks, benefits, alternatives discussed  -Labs reviewed with patient in detail  -Imaging/ETT/PFT/Vision/Hearing reports reviewed with patient in detail  -Health Maintenance Exam issues discussed  -Exercise  -Dietary recommendations discussed  -I recommended patient take advantage of complimentary Dietician Coaching through [facility].    -Therapeutic Lifestyle Changes discussed    The nature of coordination of care as below:  -Medications added: Rosuvastatin 5 mg/day  -Medications discontinued: Simvastatin  -Medications adjusted: Levothyroxine increased to 88 mcg/day. Dovonex refilled  -Continue other present chronic medications  -Referrals: none  -Other: Labs in one month: CPK (no vigorous exercise for 3 days prior), TSH  -Seek medical attention immediately if worse    FOLLOW-UP:  -With Primary Care Provider in 3 months

## 2017-11-14 NOTE — LETTER
"November 16, 2017        Adal Dobbins  [id number] [place] [address]        Dear Adal:    Thank you for participating in [facility]. We covered a great deal of information, and I have summarized my Assessment and Plan below.  Please carefully review all the information in this packet.  I would like you to repeat some labs (i.e. CPK and TSH) in about one month, and then I would like to see you back for a follow up appointment in about 3 months.     Overall, I consider you to be in excellent health.  I am particularly pleased with you mostly healthy lifestyle, and near normal weight.  We did, however, discussed several issues that require further attention.  In terms of Cardiovascular related issues, you have the following: Dyslipidemia, personal history of stroke from cerebral artery dissection, Right Bundle Branch Block on ECG, moderate coronary artery atherosclerosis, mild bilateral carotid atherosclerosis, and an elevated CPK.  The best treatment for these maladies is continued Therapeutic Lifestyle Changes. Specifically, I strongly recommend eating \"real food, mostly plants, not too much\", daily exercise, striving for a normal weight/BMI, active stress management, 7-8 hours of quality sleep per night, a loving social environment, and an altruistic philosophy.  As discussed, please contact [facility] for your complimentary dietician sessions. I did change your statin from Simvastatin to Rosuvastatin 5 mg/day.  I also increased your Levothyroxine to 88 mcg/day.  Please be compliant with your Vitamin D supplement daily.  If you have any questions or concerns, please don't hesitate to call.        Sincerely,        Wade Urias M.D.                ASSESSMENT:    Encounter Diagnoses   Name Primary?   • Executive History and Physical Examination Yes   • Mixed hyperlipidemia with apolipoprotein E3 variant    • History of stroke    • RBBB    • Psoriasis    • Autoimmune " hypothyroidism    • Coronary atherosclerosis due to calcified coronary lesion    • Agatston CAC score 100-199    • Atherosclerosis of both carotid arteries, mild, per Ultrasound    • Elevated CPK    • Vitamin D deficiency    • Right Liver hemangiomas per Ultrasound        PLAN:    Total Face-to-Face time spent with patient: 90 minutes  Amount of time spent counseling patient and/or coordinating care: 50 minutes    The nature of patient counseling as below:  -Patient Education  -Differential Diagnoses and treatment options discussed  -Risks, benefits, alternatives discussed  -Labs reviewed with patient in detail  -Imaging/ETT/PFT/Vision/Hearing reports reviewed with patient in detail  -Health Maintenance Exam issues discussed  -Exercise  -Dietary recommendations discussed  -I recommended patient take advantage of complimentary Dietician Coaching through [facility].   -Therapeutic Lifestyle Changes discussed    The nature of coordination of care as below:  -Medications added: Rosuvastatin 5 mg/day  -Medications discontinued: Simvastatin  -Medications adjusted: Levothyroxine increased to 88 mcg/day. Dovonex refilled  -Continue other present chronic medications  -Referrals: none  -Other: Labs in one month: CPK (no vigorous exercise for 3 days prior), TSH  -Seek medical attention immediately if worse    FOLLOW-UP:  -With Primary Care Provider in 3 months

## 2017-11-16 PROBLEM — D18.03 LIVER HEMANGIOMA: Status: ACTIVE | Noted: 2017-11-16

## 2017-11-16 PROBLEM — I45.10 RBBB: Status: ACTIVE | Noted: 2017-11-16

## 2017-11-16 NOTE — PATIENT INSTRUCTIONS
Current Outpatient Prescriptions Ordered in [facility]   Medication Sig Dispense Refill   • calcipotriene (DOVONEX) 0.005 % Cream Apply 2 g to affected area(s) 2 times a day. 120 g 12   • levothyroxine (SYNTHROID) 88 MCG Tab Take 1 Tab by mouth Every morning on an empty stomach. 90 Tab 4   • rosuvastatin (CRESTOR) 10 MG Tab Take 1 Tab by mouth every day. 90 Tab 4   • tadalafil (CIALIS) 5 MG tablet Take 1 Tab by mouth as needed for Erectile Dysfunction. 30 Tab 12   • benazepril (LOTENSIN) 20 MG Tab TAKE 1 TABLET BY MOUTH EVERY DAY 90 Tab 3   • Cholecalciferol (VITAMIN D) 2000 UNITS Cap Take 1 Cap by mouth every day.     • aspirin EC (ECOTRIN) 81 MG Tablet Delayed Response Take 2 Tabs by mouth every day.     • alprazolam (XANAX) 0.5 MG Tab Take 1 Tab by mouth at bedtime as needed for Sleep. 30 Tab 1     No current Epic-ordered facility-administered medications on file.

## 2017-11-17 DIAGNOSIS — R74.8 ELEVATED CPK: ICD-10-CM

## 2017-11-17 DIAGNOSIS — R79.89 ELEVATED TSH: ICD-10-CM

## 2017-12-15 ENCOUNTER — HOSPITAL ENCOUNTER (OUTPATIENT)
Facility: MEDICAL CENTER | Age: 62
End: 2017-12-15
Attending: FAMILY MEDICINE
Payer: COMMERCIAL

## 2017-12-15 ENCOUNTER — NON-PROVIDER VISIT (OUTPATIENT)
Dept: OTHER | Facility: MEDICAL CENTER | Age: 62
End: 2017-12-15
Payer: COMMERCIAL

## 2017-12-15 DIAGNOSIS — R74.8 ELEVATED CPK: ICD-10-CM

## 2017-12-15 DIAGNOSIS — R79.89 ELEVATED TSH: ICD-10-CM

## 2017-12-15 LAB
CK SERPL-CCNC: 250 U/L (ref 0–154)
T4 FREE SERPL-MCNC: 0.95 NG/DL (ref 0.53–1.43)
TSH SERPL DL<=0.005 MIU/L-ACNC: 2.73 UIU/ML (ref 0.38–5.33)

## 2018-01-12 DIAGNOSIS — H54.7 DECREASED VISUAL ACUITY: ICD-10-CM

## 2018-01-12 DIAGNOSIS — H18.9 CORNEA DISORDER: Primary | ICD-10-CM

## 2018-02-22 ENCOUNTER — OFFICE VISIT (OUTPATIENT)
Dept: OTHER | Facility: MEDICAL CENTER | Age: 63
End: 2018-02-22
Payer: COMMERCIAL

## 2018-02-22 VITALS
RESPIRATION RATE: 14 BRPM | HEART RATE: 56 BPM | SYSTOLIC BLOOD PRESSURE: 126 MMHG | BODY MASS INDEX: 27.34 KG/M2 | TEMPERATURE: 98 F | HEIGHT: 67 IN | DIASTOLIC BLOOD PRESSURE: 74 MMHG | WEIGHT: 174.2 LBS | OXYGEN SATURATION: 97 %

## 2018-02-22 DIAGNOSIS — I70.0 ABDOMINAL AORTIC ATHEROSCLEROSIS (HCC): ICD-10-CM

## 2018-02-22 DIAGNOSIS — I25.10 CORONARY ATHEROSCLEROSIS DUE TO CALCIFIED CORONARY LESION: ICD-10-CM

## 2018-02-22 DIAGNOSIS — E06.3 AUTOIMMUNE HYPOTHYROIDISM: Primary | ICD-10-CM

## 2018-02-22 DIAGNOSIS — I25.84 CORONARY ATHEROSCLEROSIS DUE TO CALCIFIED CORONARY LESION: ICD-10-CM

## 2018-02-22 DIAGNOSIS — E78.2 MIXED HYPERLIPIDEMIA WITH APOLIPOPROTEIN E3 VARIANT: ICD-10-CM

## 2018-02-22 DIAGNOSIS — R74.8 ELEVATED CPK: ICD-10-CM

## 2018-02-22 DIAGNOSIS — R93.1 AGATSTON CAC SCORE 100-199: ICD-10-CM

## 2018-02-22 DIAGNOSIS — I65.23 ATHEROSCLEROSIS OF BOTH CAROTID ARTERIES: ICD-10-CM

## 2018-02-22 PROCEDURE — 99214 OFFICE O/P EST MOD 30 MIN: CPT | Performed by: FAMILY MEDICINE

## 2018-02-23 NOTE — PROGRESS NOTES
SUBJECTIVE:    Chief Complaint   Patient presents with   • Follow-Up       Adal Dobbins is a 62 y.o. male,   Established Patient     PROBLEM #1-HISTORY OF PRESENT ILLNESS  Existing Problem, but requiring re-evaluation  PATIENT STATEMENT OF PROBLEM - Hypothyroid  ONSET - years  COURSE - he feels fine with higher (i.e. 88 mcg/day) dose of levothyroxine.     PROBLEM #2-HISTORY OF PRESENT ILLNESS  Existing Problem, but requiring re-evaluation  PATIENT STATEMENT OF PROBLEM - Dyslipidemia, atherosclerosis, elevated CK  ONSET - years  COURSE - he is doing well on new Rosuvastatin 5 mg/day (ie. One-half of 10 mg tab).     No Known Allergies    Patient Active Problem List    Diagnosis Date Noted   • RBBB 11/16/2017   • Right Liver hemangiomas per Ultrasound 11/16/2017   • Mild Chronic inactive gastritis without bleeding (per 2017 EGD) 10/09/2017   • Colon polyp (per 2017 Colonoscopy, negative for Adenomatous change or malignancy) 10/09/2017   • Abdominal aortic atherosclerosis (CMS-HCC) 04/19/2017   • Atherosclerosis of both carotid arteries, mild, per Ultrasound 04/19/2017   • Agatston CAC score 100-199 02/27/2017   • Essential hypertension 02/27/2017   • Vitamin D deficiency 02/27/2017   • Autoimmune hypothyroidism 07/01/2016   • Erectile dysfunction 09/18/2014   • Elevated TSH 09/18/2014   • Elevated CPK 09/18/2014   • Elevated LFTs 09/18/2014   • Coronary atherosclerosis due to calcified coronary lesion 02/14/2014   • Mixed hyperlipidemia with apolipoprotein E3 variant 10/09/2013   • ASTHMA    • Psoriasis    • Family history of hypertension    • History of stroke        Outpatient Encounter Prescriptions as of 2/22/2018   Medication Sig Dispense Refill   • calcipotriene (DOVONEX) 0.005 % Cream Apply 2 g to affected area(s) 2 times a day. 120 g 12   • levothyroxine (SYNTHROID) 88 MCG Tab Take 1 Tab by mouth Every morning on an empty stomach. 90 Tab 4   • rosuvastatin (CRESTOR) 10 MG Tab Take 1 Tab by mouth every day. 90  "Tab 4   • tadalafil (CIALIS) 5 MG tablet Take 1 Tab by mouth as needed for Erectile Dysfunction. 30 Tab 12   • benazepril (LOTENSIN) 20 MG Tab TAKE 1 TABLET BY MOUTH EVERY DAY 90 Tab 3   • Cholecalciferol (VITAMIN D) 2000 UNITS Cap Take 1 Cap by mouth every day.     • aspirin EC (ECOTRIN) 81 MG Tablet Delayed Response Take 2 Tabs by mouth every day.     • alprazolam (XANAX) 0.5 MG Tab Take 1 Tab by mouth at bedtime as needed for Sleep. 30 Tab 1     No facility-administered encounter medications on file as of 2/22/2018.        Social History   Substance Use Topics   • Smoking status: Light Tobacco Smoker     Types: Cigars   • Smokeless tobacco: Never Used      Comment: 1 cigar per week   • Alcohol use 8.4 oz/week     14 Standard drinks or equivalent per week       Family History   Problem Relation Age of Onset   • Hypertension Mother    • Other Father 45     Viral encephalitis       Patient's Past, Social, and Family History reviewed and updated by me in EPIC today.    REVIEW OF SYMPTOMS:               Pertinent Positives as above.    All other systems reviewed and negative.     OBJECTIVE:    /74   Pulse (!) 56   Temp 36.7 °C (98 °F)   Resp 14   Ht 1.702 m (5' 7\")   Wt 79 kg (174 lb 3.2 oz)   SpO2 97%   BMI 27.28 kg/m²   Body mass index is 27.28 kg/m².    Well developed, well nourished male, no acute distress, non-ill appearing. Comfortable, appears stated age, pleasant and cooperative  HEAD: atraumatic, normocephalic   EYES: Conjunctiva normal, extra-occular movements intact, PERRLA, acuity grossly intact.   EARS/NOSE/THROAT: TM's normal, no signs or symptoms of infection, no perforation, no hemotympanum, acuity grossly intact. Oropharynx: benign, no lesions noted. Nares: benign.   NECK: supple, no adenopathy, no thyromegaly or nodules, no jugular vein distention, no carotid bruits.   CHEST/LUNGS: clear to auscultation and percussion bilaterally. No adventitious breath sounds.   CARDIOVASCULAR: regular " rate and rhythm, no murmur. Point of maximum intensity not displaced. Good central and peripheral pulses.   BACK: no CVA tenderness.   ABDOMEN: soft, non-tender, non-distended, no masses, no hepatosplenomegaly. Normal active bowel tones.   : deferred.   Rectal: deferred.   Extremities: warm/well-perfused, no cyanosis, clubbing, or edema.   SKIN: clear, unbroken, no rashes, normal turgor.   Neuro: Mental Status: Alert and Oriented x 3. CN II-XII grossly intact. Gait normal. Non-focal, intact. Normal strength, sensation    ASSESSMENT:    1. Autoimmune hypothyroidism     2. Mixed hyperlipidemia with apolipoprotein E3 variant     3. Coronary atherosclerosis due to calcified coronary lesion     4. Elevated CPK     5. Agatston CAC score 100-199     6. Abdominal aortic atherosclerosis (CMS-HCC)     7. Atherosclerosis of both carotid arteries, mild, per Ultrasound         PLAN:    Total Face-to-Face time spent with patient: 30 minutes  Amount of time spent counseling patient and/or coordinating care: 20 minutes    The nature of patient counseling as below:  -Patient Education, including below topics:  -Differential Diagnoses and treatment options discussed  -Risks, benefits, alternatives discussed  -Labs reviewed with patient in detail  -Therapeutic Lifestyle Changes discussed    The nature of coordination of care as below:  -Continue (other) present chronic medications  -Labs: May '18, fasting BHD  -Referrals: none  -Other: none  -Seek medical attention immediately if worse    FOLLOW-UP:  -in 6 months  -and sooner if test/consult results warrant  And for Health Care Maintenance Exams  And as needed.

## 2018-02-23 NOTE — PATIENT INSTRUCTIONS
Current Outpatient Prescriptions Ordered in [facility]   Medication Sig Dispense Refill   • calcipotriene (DOVONEX) 0.005 % Cream Apply 2 g to affected area(s) 2 times a day. 120 g 12   • levothyroxine (SYNTHROID) 88 MCG Tab Take 1 Tab by mouth Every morning on an empty stomach. 90 Tab 4   • rosuvastatin (CRESTOR) 10 MG Tab Take 1 Tab by mouth every day. 90 Tab 4   • tadalafil (CIALIS) 5 MG tablet Take 1 Tab by mouth as needed for Erectile Dysfunction. 30 Tab 12   • benazepril (LOTENSIN) 20 MG Tab TAKE 1 TABLET BY MOUTH EVERY DAY 90 Tab 3   • Cholecalciferol (VITAMIN D) 2000 UNITS Cap Take 1 Cap by mouth every day.     • aspirin EC (ECOTRIN) 81 MG Tablet Delayed Response Take 2 Tabs by mouth every day.     • alprazolam (XANAX) 0.5 MG Tab Take 1 Tab by mouth at bedtime as needed for Sleep. 30 Tab 1     No current Epic-ordered facility-administered medications on file.

## 2018-04-04 ENCOUNTER — PATIENT MESSAGE (OUTPATIENT)
Dept: OTHER | Facility: MEDICAL CENTER | Age: 63
End: 2018-04-04

## 2018-04-04 DIAGNOSIS — L40.9 PSORIASIS: ICD-10-CM

## 2018-04-09 RX ORDER — CALCIPOTRIENE 50 UG/G
2 CREAM TOPICAL 2 TIMES DAILY
Qty: 120 G | Refills: 12 | OUTPATIENT
Start: 2018-04-09 | End: 2019-04-22 | Stop reason: SDUPTHER

## 2018-07-25 ENCOUNTER — APPOINTMENT (OUTPATIENT)
Dept: OTHER | Facility: MEDICAL CENTER | Age: 63
End: 2018-07-25
Payer: COMMERCIAL

## 2018-08-16 ENCOUNTER — OFFICE VISIT (OUTPATIENT)
Dept: OTHER | Facility: MEDICAL CENTER | Age: 63
End: 2018-08-16
Payer: COMMERCIAL

## 2018-08-16 VITALS
WEIGHT: 170.2 LBS | HEART RATE: 48 BPM | BODY MASS INDEX: 26.71 KG/M2 | DIASTOLIC BLOOD PRESSURE: 80 MMHG | TEMPERATURE: 98.5 F | HEIGHT: 67 IN | SYSTOLIC BLOOD PRESSURE: 120 MMHG | RESPIRATION RATE: 12 BRPM | OXYGEN SATURATION: 98 %

## 2018-08-16 DIAGNOSIS — I10 ESSENTIAL HYPERTENSION: ICD-10-CM

## 2018-08-16 DIAGNOSIS — I25.84 CORONARY ATHEROSCLEROSIS DUE TO CALCIFIED CORONARY LESION: Primary | ICD-10-CM

## 2018-08-16 DIAGNOSIS — R93.1 AGATSTON CAC SCORE 100-199: ICD-10-CM

## 2018-08-16 DIAGNOSIS — I45.10 RBBB: ICD-10-CM

## 2018-08-16 DIAGNOSIS — I70.0 ABDOMINAL AORTIC ATHEROSCLEROSIS (HCC): ICD-10-CM

## 2018-08-16 DIAGNOSIS — E78.2 MIXED HYPERLIPIDEMIA WITH APOLIPOPROTEIN E3 VARIANT: ICD-10-CM

## 2018-08-16 DIAGNOSIS — I65.23 ATHEROSCLEROSIS OF BOTH CAROTID ARTERIES: ICD-10-CM

## 2018-08-16 DIAGNOSIS — I25.10 CORONARY ATHEROSCLEROSIS DUE TO CALCIFIED CORONARY LESION: Primary | ICD-10-CM

## 2018-08-16 DIAGNOSIS — Z86.73 HISTORY OF STROKE: ICD-10-CM

## 2018-08-16 PROCEDURE — 99214 OFFICE O/P EST MOD 30 MIN: CPT | Performed by: FAMILY MEDICINE

## 2018-09-11 ENCOUNTER — HOSPITAL ENCOUNTER (OUTPATIENT)
Dept: CARDIOLOGY | Facility: MEDICAL CENTER | Age: 63
End: 2018-09-11
Attending: FAMILY MEDICINE
Payer: COMMERCIAL

## 2018-09-11 DIAGNOSIS — I10 ESSENTIAL HYPERTENSION: ICD-10-CM

## 2018-09-11 DIAGNOSIS — I70.0 ABDOMINAL AORTIC ATHEROSCLEROSIS (HCC): ICD-10-CM

## 2018-09-11 DIAGNOSIS — I25.84 CORONARY ATHEROSCLEROSIS DUE TO CALCIFIED CORONARY LESION: ICD-10-CM

## 2018-09-11 DIAGNOSIS — E78.2 MIXED HYPERLIPIDEMIA WITH APOLIPOPROTEIN E3 VARIANT: ICD-10-CM

## 2018-09-11 DIAGNOSIS — I25.10 CORONARY ATHEROSCLEROSIS DUE TO CALCIFIED CORONARY LESION: ICD-10-CM

## 2018-09-11 DIAGNOSIS — I65.23 ATHEROSCLEROSIS OF BOTH CAROTID ARTERIES: ICD-10-CM

## 2018-09-11 DIAGNOSIS — I45.10 RBBB: ICD-10-CM

## 2018-09-11 DIAGNOSIS — Z86.73 HISTORY OF STROKE: ICD-10-CM

## 2018-09-11 DIAGNOSIS — R93.1 AGATSTON CAC SCORE 100-199: ICD-10-CM

## 2018-09-11 LAB
LV EJECT FRACT  99904: 60
LV EJECT FRACT MOD 2C 99903: 56.66
LV EJECT FRACT MOD 4C 99902: 78.58
LV EJECT FRACT MOD BP 99901: 69.9

## 2018-09-11 PROCEDURE — 93350 STRESS TTE ONLY: CPT | Mod: 26 | Performed by: INTERNAL MEDICINE

## 2018-09-11 PROCEDURE — 93017 CV STRESS TEST TRACING ONLY: CPT

## 2018-09-11 PROCEDURE — 93018 CV STRESS TEST I&R ONLY: CPT | Performed by: INTERNAL MEDICINE

## 2018-09-11 PROCEDURE — 93350 STRESS TTE ONLY: CPT

## 2018-10-02 NOTE — PROGRESS NOTES
SUBJECTIVE:    Chief Complaint   Patient presents with   • Coronary Artery Disease     & other ASCVD risk factors       Adal Dobbins is a 63 y.o. male,   Established Patient     PROBLEM #1-HISTORY OF PRESENT ILLNESS  Existing Problem, but requiring re-evaluation  PATIENT STATEMENT OF PROBLEM - known CAD per CTCS of 133.3 in 2014. Also, personal history of stroke (likely not ischemic).   ONSET - years  COURSE - asymptomatic. Counseled.   INTENSITY/STATUS/LOCATION/RADIATION - moderate per CTCS  AGGRAVATORS - Multifactorial   RELIEVERS - Medications, mostly good Therapeutic Lifestyle Changes   TREATMENTS/COMPLIANCE/@GOAL? - same/ good/ clinically yes    No Known Allergies    Patient Active Problem List    Diagnosis Date Noted   • RBBB 11/16/2017   • Right Liver hemangiomas per Ultrasound 11/16/2017   • Mild Chronic inactive gastritis without bleeding (per 2017 EGD) 10/09/2017   • Colon polyp (per 2017 Colonoscopy, negative for Adenomatous change or malignancy) 10/09/2017   • Abdominal aortic atherosclerosis (HCC) 04/19/2017   • Atherosclerosis of both carotid arteries, mild, per Ultrasound 04/19/2017   • Agatston CAC score 100-199 02/27/2017   • Essential hypertension 02/27/2017   • Vitamin D deficiency 02/27/2017   • Autoimmune hypothyroidism 07/01/2016   • Erectile dysfunction 09/18/2014   • Elevated TSH 09/18/2014   • Elevated CPK 09/18/2014   • Elevated LFTs 09/18/2014   • Coronary atherosclerosis due to calcified coronary lesion 02/14/2014   • Mixed hyperlipidemia with apolipoprotein E3 variant 10/09/2013   • ASTHMA    • Psoriasis    • Family history of hypertension    • History of stroke        Outpatient Encounter Prescriptions as of 8/16/2018   Medication Sig Dispense Refill   • calcipotriene (DOVONEX) 0.005 % Cream Apply 2 g to affected area(s) 2 times a day. 120 g 12   • benazepril (LOTENSIN) 20 MG Tab TAKE 1 TABLET BY MOUTH EVERY DAY 90 Tab 4   • levothyroxine (SYNTHROID) 88 MCG Tab Take 1 Tab by mouth  "Every morning on an empty stomach. 90 Tab 4   • rosuvastatin (CRESTOR) 10 MG Tab Take 1 Tab by mouth every day. 90 Tab 4   • tadalafil (CIALIS) 5 MG tablet Take 1 Tab by mouth as needed for Erectile Dysfunction. 30 Tab 12   • Cholecalciferol (VITAMIN D) 2000 UNITS Cap Take 1 Cap by mouth every day.     • aspirin EC (ECOTRIN) 81 MG Tablet Delayed Response Take 2 Tabs by mouth every day.     • alprazolam (XANAX) 0.5 MG Tab Take 1 Tab by mouth at bedtime as needed for Sleep. 30 Tab 1     No facility-administered encounter medications on file as of 8/16/2018.        Social History   Substance Use Topics   • Smoking status: Light Tobacco Smoker     Types: Cigars   • Smokeless tobacco: Never Used      Comment: 1 cigar per week   • Alcohol use 8.4 oz/week     14 Standard drinks or equivalent per week       Family History   Problem Relation Age of Onset   • Hypertension Mother    • Other Father 45        Viral encephalitis       Patient's Past, Social, and Family History reviewed and updated by me in EPIC today.    REVIEW OF SYMPTOMS:               Pertinent Positives as above.    All other systems reviewed and negative.     OBJECTIVE:    /80   Pulse (!) 48   Temp 36.9 °C (98.5 °F)   Resp 12   Ht 1.702 m (5' 7\")   Wt 77.2 kg (170 lb 3.2 oz)   SpO2 98%   BMI 26.66 kg/m²   Body mass index is 26.66 kg/m².    Well developed, well nourished male, no acute distress, non-ill appearing. Comfortable, appears stated age, pleasant and cooperative  HEAD: atraumatic, normocephalic   EYES: Conjunctiva normal, extra-occular movements intact, PERRLA, acuity grossly intact.   EARS/NOSE/THROAT: TM's normal, no signs or symptoms of infection, no perforation, no hemotympanum, acuity grossly intact. Oropharynx: benign, no lesions noted. Nares: benign.   NECK: supple, no adenopathy, no thyromegaly or nodules, no jugular vein distention, no carotid bruits.   CHEST/LUNGS: clear to auscultation and percussion bilaterally. No " adventitious breath sounds.   CARDIOVASCULAR: regular rate and rhythm, no murmur. Point of maximum intensity not displaced. Good central and peripheral pulses.   BACK: no CVA tenderness.   ABDOMEN: soft, non-tender, non-distended, no masses, no hepatosplenomegaly. Normal active bowel tones.   : deferred.   Rectal: deferred.   Extremities: warm/well-perfused, no cyanosis, clubbing, or edema.   SKIN: clear, unbroken, no rashes, normal turgor.   Neuro: Mental Status: Alert and Oriented x 3. CN II-XII grossly intact. Gait normal. Non-focal, intact. Normal strength, sensation    ASSESSMENT:    1. Coronary atherosclerosis due to calcified coronary lesion  ECHO-REST/STRESS W/O CONTRAST   2. Mixed hyperlipidemia with apolipoprotein E3 variant  ECHO-REST/STRESS W/O CONTRAST   3. History of stroke  ECHO-REST/STRESS W/O CONTRAST   4. Agatston CAC score 100-199  ECHO-REST/STRESS W/O CONTRAST   5. Essential hypertension  ECHO-REST/STRESS W/O CONTRAST   6. Abdominal aortic atherosclerosis (HCC)  ECHO-REST/STRESS W/O CONTRAST   7. Atherosclerosis of both carotid arteries, mild, per Ultrasound  ECHO-REST/STRESS W/O CONTRAST   8. RBBB  ECHO-REST/STRESS W/O CONTRAST       PLAN:    Total Face-to-Face time spent with patient: 25 minutes  Amount of time spent counseling patient and/or coordinating care: 15 minutes    The nature of patient counseling as below:  -Patient Education, including below topics:  -Differential Diagnoses and treatment options discussed  -Risks, benefits, alternatives discussed  -Health Maintenance Exam issues discussed  -Therapeutic Lifestyle Changes discussed    The nature of coordination of care as below:  -Continue (other) present chronic medications  -Other: Stress ECHO  -Seek medical attention immediately if worse    FOLLOW-UP:  -soon for annual H&P  -and sooner if test/consult results warrant  And for Health Care Maintenance Exams  And as needed.

## 2018-10-25 DIAGNOSIS — Z00.00 WELL ADULT EXAM: ICD-10-CM

## 2018-10-26 ENCOUNTER — APPOINTMENT (RX ONLY)
Dept: URBAN - METROPOLITAN AREA CLINIC 35 | Facility: CLINIC | Age: 63
Setting detail: DERMATOLOGY
End: 2018-10-26

## 2018-10-26 DIAGNOSIS — Z41.9 ENCOUNTER FOR PROCEDURE FOR PURPOSES OTHER THAN REMEDYING HEALTH STATE, UNSPECIFIED: ICD-10-CM

## 2018-10-26 PROCEDURE — ? FRAXEL RESTORE DUAL

## 2018-10-26 NOTE — PROCEDURE: FRAXEL RESTORE DUAL
Passes (Optional): 8
Price (Use Numbers Only, No Special Characters Or $): 1000.00
Detail Level: Zone
Length Topical Anesthesia Applied (Optional): 60 minutes
Actual Kj Used (Optional): 3.74
Topical Anesthesia?: 23% lidocaine, 7% tetracaine
Post-Care Instructions: I reviewed with the patient in detail post-care instructions.
Energy In Mj (Optional): 20
Consent: Written consent obtained, risks reviewed including but not limited to crusting, scabbing, blistering, scarring, darker or lighter pigmentary change, and/or incomplete removal.
Treatment Number (Optional): 13

## 2018-10-26 NOTE — HPI: COSMETIC (LASER RESURFACING)
Have You Had Laser Resurfacing Before?: has had previous treatments
When Was Your Last Laser Resurfacing Treatment?: 9/4/18

## 2018-11-09 ENCOUNTER — APPOINTMENT (RX ONLY)
Dept: URBAN - METROPOLITAN AREA CLINIC 35 | Facility: CLINIC | Age: 63
Setting detail: DERMATOLOGY
End: 2018-11-09

## 2018-11-09 DIAGNOSIS — Z41.9 ENCOUNTER FOR PROCEDURE FOR PURPOSES OTHER THAN REMEDYING HEALTH STATE, UNSPECIFIED: ICD-10-CM

## 2018-11-09 PROCEDURE — ? BOTOX

## 2018-11-09 PROCEDURE — ? ADDITIONAL NOTES

## 2018-11-09 NOTE — PROCEDURE: BOTOX
Levator Labii Superioris Units: 0
Additional Area 5 Location: perioral
Expiration Date (Month Year): 04/2021
Additional Area 1 Location: Frontalis
Post-Care Instructions: Patient instructed to not lie down for 4 hours after injections and limit physical activity for 24 hours.
Price (Use Numbers Only, No Special Characters Or $): 900
Additional Area 1 Units: 20
Detail Level: Detailed
Additional Area 6 Location: platysma
Reconstitution Date (Optional): 10/9/2018
Dilution (U/0.1 Cc): 5
Additional Area 2 Location: Crows Feet
Additional Area 3 Units: 25
Additional Area 3 Location: Glabella
Lot #: [id number]
Additional Area 2 Units: 30
Consent: Written consent obtained. Risks include but not limited to lid/brow ptosis or drooping, bruising, swelling, diplopia/double vision, temporary effect, incomplete chemical denervation/relaxation of muscles.
Additional Area 4 Location: Bunny lines

## 2018-12-03 DIAGNOSIS — G47.25 JET LAG: Primary | ICD-10-CM

## 2018-12-03 RX ORDER — ALPRAZOLAM 0.5 MG/1
0.5 TABLET ORAL NIGHTLY PRN
Qty: 5 TAB | Refills: 0 | Status: SHIPPED
Start: 2018-12-03 | End: 2019-05-28 | Stop reason: SDUPTHER

## 2018-12-03 RX ORDER — ALPRAZOLAM 0.5 MG/1
0.5 TABLET ORAL NIGHTLY PRN
Qty: 30 TAB | Refills: 0 | Status: SHIPPED
Start: 2018-12-03 | End: 2018-12-03 | Stop reason: SDUPTHER

## 2018-12-06 ENCOUNTER — APPOINTMENT (OUTPATIENT)
Dept: OTHER | Facility: MEDICAL CENTER | Age: 63
End: 2018-12-06
Payer: COMMERCIAL

## 2018-12-06 ENCOUNTER — HOSPITAL ENCOUNTER (OUTPATIENT)
Dept: RADIOLOGY | Facility: MEDICAL CENTER | Age: 63
End: 2018-12-06
Attending: FAMILY MEDICINE
Payer: COMMERCIAL

## 2018-12-06 ENCOUNTER — HOSPITAL ENCOUNTER (OUTPATIENT)
Facility: MEDICAL CENTER | Age: 63
End: 2018-12-06
Attending: FAMILY MEDICINE
Payer: COMMERCIAL

## 2018-12-06 DIAGNOSIS — Z00.00 WELL ADULT EXAM: ICD-10-CM

## 2018-12-06 DIAGNOSIS — E78.00 HYPERCHOLESTEREMIA: ICD-10-CM

## 2018-12-06 LAB
APPEARANCE UR: CLEAR
BILIRUB UR QL STRIP.AUTO: NEGATIVE
COLOR UR: YELLOW
CREAT UR-MCNC: 48.9 MG/DL
GLUCOSE UR STRIP.AUTO-MCNC: NEGATIVE MG/DL
HCV AB SER QL: NEGATIVE
KETONES UR STRIP.AUTO-MCNC: NEGATIVE MG/DL
LEUKOCYTE ESTERASE UR QL STRIP.AUTO: NEGATIVE
MICRO URNS: NORMAL
MICROALBUMIN UR-MCNC: <0.7 MG/DL
MICROALBUMIN/CREAT UR: NORMAL MG/G (ref 0–30)
NITRITE UR QL STRIP.AUTO: NEGATIVE
PH UR STRIP.AUTO: 7 [PH]
PROT UR QL STRIP: NEGATIVE MG/DL
RBC UR QL AUTO: NEGATIVE
SP GR UR STRIP.AUTO: 1.01
UROBILINOGEN UR STRIP.AUTO-MCNC: 0.2 MG/DL

## 2018-12-06 PROCEDURE — 0126T US-CIMT DUPLEX: CPT

## 2018-12-06 PROCEDURE — 306723 US-TOTAL VASCULAR SCREENING (S/P)

## 2018-12-25 DIAGNOSIS — E78.2 MIXED HYPERLIPIDEMIA WITH APOLIPOPROTEIN E3 VARIANT: ICD-10-CM

## 2018-12-26 RX ORDER — ROSUVASTATIN CALCIUM 10 MG/1
TABLET, COATED ORAL
Qty: 90 TAB | Refills: 3 | Status: SHIPPED | OUTPATIENT
Start: 2018-12-26 | End: 2019-07-30 | Stop reason: SDUPTHER

## 2019-01-22 ENCOUNTER — APPOINTMENT (RX ONLY)
Dept: URBAN - METROPOLITAN AREA CLINIC 35 | Facility: CLINIC | Age: 64
Setting detail: DERMATOLOGY
End: 2019-01-22

## 2019-01-22 DIAGNOSIS — Z41.9 ENCOUNTER FOR PROCEDURE FOR PURPOSES OTHER THAN REMEDYING HEALTH STATE, UNSPECIFIED: ICD-10-CM

## 2019-01-22 PROCEDURE — ? FRAXEL RESTORE DUAL

## 2019-01-22 NOTE — PROCEDURE: FRAXEL RESTORE DUAL
Treatment Level (Optional): 8
Topical Anesthesia?: 23% lidocaine, 7% tetracaine
Treatment Number (Optional): 14
Energy In Mj (Optional): 20
Consent: Written consent obtained, risks reviewed including but not limited to crusting, scabbing, blistering, scarring, darker or lighter pigmentary change, and/or incomplete removal.
Actual Kj Used (Optional): 3.77
Detail Level: Zone
Eye Shielding Text (Leave Blank If Unwanted- Will Be Inserted If Selecting Eye Shields): The intraocular eye shields were placed. 2 drops of intraocular tetracaine HCL ophthalmic 0.5% solution was administered. The eye shields were coated with ophthalmic bacitracin prior to insertion. After the shields were removed the eyes were flushed with normal saline.
Post-Care Instructions: I reviewed with the patient in detail post-care instructions.
Length Topical Anesthesia Applied (Optional): 60 minutes
Price (Use Numbers Only, No Special Characters Or $): 1000.00

## 2019-01-22 NOTE — HPI: COSMETIC (LASER RESURFACING)
Have You Had Laser Resurfacing Before?: has had previous treatments
When Was Your Last Laser Resurfacing Treatment?: 10/26/18

## 2019-01-31 DIAGNOSIS — N52.8 OTHER MALE ERECTILE DYSFUNCTION: ICD-10-CM

## 2019-02-03 RX ORDER — TADALAFIL 5 MG
TABLET ORAL
Qty: 30 TAB | Refills: 1 | Status: SHIPPED | OUTPATIENT
Start: 2019-02-03 | End: 2019-04-02 | Stop reason: SDUPTHER

## 2019-02-14 ENCOUNTER — APPOINTMENT (RX ONLY)
Dept: URBAN - METROPOLITAN AREA CLINIC 35 | Facility: CLINIC | Age: 64
Setting detail: DERMATOLOGY
End: 2019-02-14

## 2019-02-14 DIAGNOSIS — Z41.9 ENCOUNTER FOR PROCEDURE FOR PURPOSES OTHER THAN REMEDYING HEALTH STATE, UNSPECIFIED: ICD-10-CM

## 2019-02-14 PROCEDURE — ? ADDITIONAL NOTES

## 2019-02-14 PROCEDURE — ? BOTOX

## 2019-02-14 NOTE — PROCEDURE: BOTOX
Additional Area 2 Location: Crows Feet
Additional Area 6 Location: platysma
Price (Use Numbers Only, No Special Characters Or $): 900
Detail Level: Detailed
Forehead Units: 0
Additional Area 4 Location: Bunny lines
Expiration Date (Month Year): 01/2021
Additional Area 1 Units: 25
Additional Area 2 Units: 30
Consent: Verbal and written informed consent were obtained to include the following risks: pain, swelling, bruising, eyelid or eyebrow droop, and lack of visible improvement of wrinkles in the areas treated.  The skin was cleansed with alcohol. Injections were administered with a 32g needle into the following areas:
Lot #: [id number]
Additional Area 5 Location: perioral
Reconstitution Date (Optional): 2/14/2019
Post-Care Instructions: Patient instructed to not lie down for 4 hours after injections and limit physical activity for 24 hours.
Additional Area 1 Location: Glabella
Additional Area 3 Units: 20
Additional Area 3 Location: Frontalis
Dilution (U/0.1 Cc): 5

## 2019-02-21 ENCOUNTER — APPOINTMENT (OUTPATIENT)
Dept: OTHER | Facility: MEDICAL CENTER | Age: 64
End: 2019-02-21

## 2019-02-21 ENCOUNTER — OFFICE VISIT (OUTPATIENT)
Dept: OTHER | Facility: MEDICAL CENTER | Age: 64
End: 2019-02-21

## 2019-02-21 VITALS
DIASTOLIC BLOOD PRESSURE: 78 MMHG | BODY MASS INDEX: 25.51 KG/M2 | SYSTOLIC BLOOD PRESSURE: 120 MMHG | TEMPERATURE: 98.1 F | HEART RATE: 45 BPM | RESPIRATION RATE: 12 BRPM | OXYGEN SATURATION: 96 % | HEIGHT: 69 IN | WEIGHT: 172.2 LBS

## 2019-02-21 DIAGNOSIS — E55.9 VITAMIN D DEFICIENCY: ICD-10-CM

## 2019-02-21 DIAGNOSIS — I10 ESSENTIAL HYPERTENSION: ICD-10-CM

## 2019-02-21 DIAGNOSIS — I25.10 CORONARY ATHEROSCLEROSIS DUE TO CALCIFIED CORONARY LESION: ICD-10-CM

## 2019-02-21 DIAGNOSIS — Z23 NEED FOR SHINGLES VACCINE: ICD-10-CM

## 2019-02-21 DIAGNOSIS — I25.84 CORONARY ATHEROSCLEROSIS DUE TO CALCIFIED CORONARY LESION: ICD-10-CM

## 2019-02-21 DIAGNOSIS — Z86.73 HISTORY OF STROKE: ICD-10-CM

## 2019-02-21 DIAGNOSIS — E78.2 MIXED HYPERLIPIDEMIA WITH APOLIPOPROTEIN E3 VARIANT: ICD-10-CM

## 2019-02-21 DIAGNOSIS — E78.41 ELEVATED LP(A): ICD-10-CM

## 2019-02-21 DIAGNOSIS — I65.23 ATHEROSCLEROSIS OF BOTH CAROTID ARTERIES: ICD-10-CM

## 2019-02-21 DIAGNOSIS — I63.219 CEREBROVASCULAR ACCIDENT (CVA) DUE TO OCCLUSION OF VERTEBRAL ARTERY, UNSPECIFIED BLOOD VESSEL LATERALITY (HCC): ICD-10-CM

## 2019-02-21 DIAGNOSIS — I70.0 ABDOMINAL AORTIC ATHEROSCLEROSIS (HCC): ICD-10-CM

## 2019-02-21 DIAGNOSIS — Z00.00 WELL ADULT EXAM: Primary | ICD-10-CM

## 2019-02-21 DIAGNOSIS — R93.1 AGATSTON CAC SCORE 100-199: ICD-10-CM

## 2019-02-21 DIAGNOSIS — E06.3 AUTOIMMUNE HYPOTHYROIDISM: ICD-10-CM

## 2019-02-21 NOTE — LETTER
"March 4, 2019        Adal Dobbins  [address] [place] [address]        Dear Adal:    Thank you for again participating in [facility]. We covered a great deal of information, and I have summarized my Assessment and Plan below.  Please carefully review all the information in this packet.  I would like to have you come back in about 6 month for some repeat advanced lipid labs, and then see me for consultation about 2 weeks afterwards    Overall, I think you are in excellent health. I am particularly pleased with your commitment to a healthy diet, and maintaining a normal weight. As discussed, you do have the following cardio-metabolic related issues: personal history of stroke from vertebral artery dissection, dyslipidemia including elevated Lp(a), hypertension, atherosclerosis of your abdominal aorta and carotid & coronary arteries, and Vitamin D deficiency. The best treatment for most of these conditions is Therapeutic Lifestyle Changes.  Specifically, I strongly recommend eating \"real food, mostly plants, not too much\", daily exercise, striving for a normal weight/BMI, active stress management, 7-8 hours of quality sleep per night, a loving social environment, and an altruistic philosophy.  Please continue your current medications, and resume the Vitamin D3 OTC 2000 IU/day you were previously taking. The main take home message is: Keep Up The Good Work!    If you have any questions or concerns, please don't hesitate to call.        Sincerely,        Wade Urias M.D.    ASSESSMENT:    Encounter Diagnoses   Name Primary?   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    • Mixed hyperlipidemia with apolipoprotein E3 variant    • Coronary atherosclerosis due to calcified coronary lesion    • " Autoimmune hypothyroidism    • Agatston CAC score 100-199    • Essential hypertension    • Vitamin D deficiency    • Abdominal aortic atherosclerosis (HCC)    • Atherosclerosis of both carotid arteries, mild, per Ultrasound    • Elevated Lp(a)    • Need for shingles vaccine        PLAN:    Total Face-to-Face time spent with patient: 90 minutes  Amount of time spent counseling patient and/or coordinating care: 50 minutes    The nature of patient counseling as below:  -Patient Education  -Differential Diagnoses and treatment options discussed  -Risks, benefits, alternatives discussed  -Labs reviewed with patient in detail  -Imaging/ETT/Spirometry/vision/hearing reports reviewed with patient in detail  -Health Maintenance Exam issues discussed  -Exercise  -Dietary recommendations discussed  -Therapeutic Lifestyle Changes discussed    The nature of coordination of care as below:  -Medications added: none  -Medications discontinued: none  -Medications adjusted: Resume Vitamin D3 2000 IU/day (and improved compliance)  -Continue present chronic medications  -Referrals: none  -Other: Shingrix Rx given for administration at pharmacy   -Advanced Lipoproteins again in 6 months  -Seek medical attention immediately if worse    FOLLOW-UP:  -With me in 6 months (2 weeks after labs)

## 2019-02-21 NOTE — PROGRESS NOTES
[facility]    EXECUTIVE HISTORY AND PHYSICAL  Performed by Dr. Wade Urias    SUBJECTIVE:    Chief Complaint   Patient presents with   • Executive Physical   • Coronary Artery Disease     Per CTCS: 133.3 in 2014   • Hyperlipidemia   • Vitamin D Deficiency   • Immunizations       Adal Dobbins is a 63 y.o. male,   Established Patient @ [facility]    Preventive medicine issues discussed:  abuse, aspirin, dental, Alcohol, Tobacco, HIV/AIDS, injuries, mental health/depression, nutrition, exercise, occupational health, sexual behavior, UV exposure, advanced directives, Cancer Screening. Vaccines.      PROBLEM #1-HISTORY OF PRESENT ILLNESS  Existing Problem  PATIENT STATEMENT OF PROBLEM - Cardio-metabolic related issues.   ONSET - years  COURSE - Known atherosclerosis of coronary, carotid and abdominal aorta arteries. History of stroke (likely from vertebral artery dissection.) Currently on Rosuvastatin, Benazepril, and ASA. Asymptomatic. Eating a near-vegan diet. Normal weight, rare cigar. Imaging: CTCS: 133.3 in 2014. Today: mild bilateral carotid arthrosclerosis and mild AA atherosclerosis. Stress ECHO: negative for ischemia. OPO: WNL. Current pertinent labs:    HIGH & INT RISK: LDL/ApoB/LDL-P/sdLDL/Lp(a)(32)/ApoA1/ApoB:ApoA1/HDL:TRIG/Sterol-hyperabsorption/Adipo/FA-imbalance/D(18).   Counseled.   INTENSITY/STATUS/LOCATION/RADIATION - variable/ currently subclinical/ as above/ na  AGGRAVATORS - Multifactorial   RELIEVERS - medications, Therapeutic Lifestyle Changes   TREATMENTS/COMPLIANCE/@GOAL? - same/ good/ clinically yes    PROBLEM #2-HISTORY OF PRESENT ILLNESS  New Problem  PATIENT STATEMENT OF PROBLEM - HME issues  ONSET - discussed today  COURSE - Rx given for Shingrix (currently difficult to obtain), and Advanced Directives discussed, paperwork sent with patient.     No Known Allergies    Patient Active Problem List    Diagnosis Date Noted   • Executive History and Physical  Examination 03/04/2019   • Elevated Lp(a) 03/04/2019   • Need for shingles vaccine 03/04/2019   • RBBB 11/16/2017   • Right Liver hemangiomas per Ultrasound 11/16/2017   • Mild Chronic inactive gastritis without bleeding (per 2017 EGD) 10/09/2017   • Colon polyp (per 2017 Colonoscopy, negative for Adenomatous change or malignancy) 10/09/2017   • Abdominal aortic atherosclerosis (HCC) 04/19/2017   • Atherosclerosis of both carotid arteries, mild, per Ultrasound 04/19/2017   • Agatston CAC score 100-199 02/27/2017   • Essential hypertension 02/27/2017   • Vitamin D deficiency 02/27/2017   • Autoimmune hypothyroidism 07/01/2016   • Erectile dysfunction 09/18/2014   • Elevated TSH 09/18/2014   • Elevated CPK 09/18/2014   • Elevated LFTs 09/18/2014   • Coronary atherosclerosis due to calcified coronary lesion 02/14/2014   • Mixed hyperlipidemia with apolipoprotein E3 variant 10/09/2013   • ASTHMA    • Psoriasis    • Family history of hypertension    • 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 120 g 12   • benazepril (LOTENSIN) 20 MG Tab TAKE 1 TABLET BY MOUTH EVERY DAY 90 Tab 4   • Cholecalciferol (VITAMIN D) 2000 UNITS Cap Take 1 Cap by mouth every day.     • aspirin EC (ECOTRIN) 81 MG Tablet Delayed Response Take 2 Tabs by mouth every day.       No current facility-administered medications on file prior to visit.        Social History     Social History   • Marital status:      Spouse name: Jada   • Number of children: 3   • Years of education:  "24     Occupational History   • Orthopedic Surgeon      Social History Main Topics   • Smoking status: Light Tobacco Smoker     Types: Cigars   • Smokeless tobacco: Never Used      Comment: 1 cigar per week   • Alcohol use 8.4 oz/week     14 Standard drinks or equivalent per week   • Drug use: No   • Sexual activity: Yes     Partners: Female     Other Topics Concern   • Not on file     Social History Narrative   • No narrative on file       Family History   Problem Relation Age of Onset   • Hypertension Mother    • Other Father 45        Viral encephalitis       Patient's Past, Social, and Family History reviewed     REVIEW OF SYMPTOMS:               Pertinent Positives as above.    All other systems reviewed and negative.     OBJECTIVE:    /78   Pulse (!) 45   Temp 36.7 °C (98.1 °F) (Temporal)   Resp 12   Ht 1.74 m (5' 8.5\")   Wt 78.1 kg (172 lb 3.2 oz)   SpO2 96%   BMI 25.80 kg/m²   Body mass index is 25.8 kg/m².    Well developed, well nourished male, no acute distress, non-ill appearing. Comfortable, appears stated age, pleasant and cooperative, Alert and Oriented x 3.   HEAD: atraumatic, normocephalic   EYES: Conjunctiva normal, EOMI, PERRLA, acuity grossly intact.   EARS/NOSE/THROAT: TM's normal, no SSX of infection, no perforation, no hemotympanum, acuity grossly intact. Oropharynx: benign, no lesions noted. Nares: benign.   NECK: supple, no adenopathy, no thyromegaly or nodules, no JVD, no carotid bruits.   CHEST/LUNGS: clear to auscultation and percussion bilaterally. No adventitious breath sounds.   CARDIOVASCULAR: regular rate and rhythm, no murmur. PMI not displaced. Good central and peripheral pulses.   BACK: no CVA tenderness.   ABDOMEN: soft, non-tender, non-distended, no masses, no hepatosplenomegaly. Normal active bowel tones.   : deferred.   Rectal: normal.   Extremities: warm/well-perfused, no cyanosis, clubbing, or edema.   SKIN: clear, unbroken, no rashes, normal turgor.   Neuro: " Mental Status: Alert and Oriented x 3. CN II-XII grossly intact. Gait normal. Non-focal, intact. Normal strength, sensation    ASSESSMENT:    Encounter Diagnoses   Name Primary?   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    • Mixed hyperlipidemia with apolipoprotein E3 variant    • Coronary atherosclerosis due to calcified coronary lesion    • Autoimmune hypothyroidism    • Agatston CAC score 100-199    • Essential hypertension    • Vitamin D deficiency    • Abdominal aortic atherosclerosis (HCC)    • Atherosclerosis of both carotid arteries, mild, per Ultrasound    • Elevated Lp(a)    • Need for shingles vaccine        PLAN:    Total Face-to-Face time spent with patient: 90 minutes  Amount of time spent counseling patient and/or coordinating care: 50 minutes    The nature of patient counseling as below:  -Patient Education  -Differential Diagnoses and treatment options discussed  -Risks, benefits, alternatives discussed  -Labs reviewed with patient in detail  -Imaging/ETT/Spirometry/vision/hearing reports reviewed with patient in detail  -Health Maintenance Exam issues discussed  -Exercise  -Dietary recommendations discussed  -Therapeutic Lifestyle Changes discussed    The nature of coordination of care as below:  -Medications added: none  -Medications discontinued: none  -Medications adjusted: Resume Vitamin D3 2000 IU/day (and improved compliance)  -Continue present chronic medications  -Referrals: none  -Other: Shingrix Rx given for administration at pharmacy   -Advanced Lipoproteins again in 6 months  -Seek medical attention immediately if worse    FOLLOW-UP:  -With me in 6 months (2 weeks after labs)

## 2019-03-04 DIAGNOSIS — I70.0 ABDOMINAL AORTIC ATHEROSCLEROSIS (HCC): ICD-10-CM

## 2019-03-04 DIAGNOSIS — E55.9 VITAMIN D DEFICIENCY: ICD-10-CM

## 2019-03-04 DIAGNOSIS — I25.10 CORONARY ATHEROSCLEROSIS DUE TO CALCIFIED CORONARY LESION: ICD-10-CM

## 2019-03-04 DIAGNOSIS — I63.219 CEREBROVASCULAR ACCIDENT (CVA) DUE TO OCCLUSION OF VERTEBRAL ARTERY, UNSPECIFIED BLOOD VESSEL LATERALITY (HCC): ICD-10-CM

## 2019-03-04 DIAGNOSIS — I25.84 CORONARY ATHEROSCLEROSIS DUE TO CALCIFIED CORONARY LESION: ICD-10-CM

## 2019-03-04 DIAGNOSIS — R93.1 AGATSTON CAC SCORE 100-199: ICD-10-CM

## 2019-03-04 DIAGNOSIS — R74.8 ELEVATED CPK: ICD-10-CM

## 2019-03-04 DIAGNOSIS — E78.2 MIXED HYPERLIPIDEMIA WITH APOLIPOPROTEIN E3 VARIANT: ICD-10-CM

## 2019-03-04 DIAGNOSIS — I10 ESSENTIAL HYPERTENSION: ICD-10-CM

## 2019-03-04 DIAGNOSIS — I65.23 ATHEROSCLEROSIS OF BOTH CAROTID ARTERIES: ICD-10-CM

## 2019-03-04 PROBLEM — Z00.00 WELL ADULT EXAM: Status: ACTIVE | Noted: 2019-03-04

## 2019-03-04 PROBLEM — Z23 NEED FOR SHINGLES VACCINE: Status: ACTIVE | Noted: 2019-03-04

## 2019-03-04 PROBLEM — E78.41 ELEVATED LP(A): Status: ACTIVE | Noted: 2019-03-04

## 2019-03-04 NOTE — PATIENT INSTRUCTIONS
Current Outpatient Prescriptions Ordered in [facility]   Medication Sig Dispense Refill   • CIALIS 5 MG tablet TAKE 1 TABLET BY MOUTH DAILY AS NEEDED FOR ERECTILE DYSFUNCTION 30 Tab 1   • rosuvastatin (CRESTOR) 10 MG Tab TAKE 1 TABLET BY MOUTH EVERY DAY 90 Tab 3   • levothyroxine (SYNTHROID) 88 MCG Tab TAKE 1 TABLET BY MOUTH EVERY MORNING ON AN EMPTY STOMACH 90 Tab 4   • calcipotriene (DOVONEX) 0.005 % Cream Apply 2 g to affected area(s) 2 times a day. 120 g 12   • benazepril (LOTENSIN) 20 MG Tab TAKE 1 TABLET BY MOUTH EVERY DAY 90 Tab 4   • Cholecalciferol (VITAMIN D) 2000 UNITS Cap Take 1 Cap by mouth every day.     • aspirin EC (ECOTRIN) 81 MG Tablet Delayed Response Take 2 Tabs by mouth every day.       No current [facility]-ordered facility-administered medications on file.

## 2019-04-02 ENCOUNTER — PATIENT MESSAGE (OUTPATIENT)
Dept: OTHER | Facility: MEDICAL CENTER | Age: 64
End: 2019-04-02

## 2019-04-02 DIAGNOSIS — N52.8 OTHER MALE ERECTILE DYSFUNCTION: ICD-10-CM

## 2019-04-02 RX ORDER — TADALAFIL 5 MG/1
5 TABLET ORAL PRN
Qty: 30 TAB | Refills: 1 | Status: SHIPPED | OUTPATIENT
Start: 2019-04-02 | End: 2019-07-30

## 2019-04-23 ENCOUNTER — APPOINTMENT (RX ONLY)
Dept: URBAN - METROPOLITAN AREA CLINIC 35 | Facility: CLINIC | Age: 64
Setting detail: DERMATOLOGY
End: 2019-04-23

## 2019-04-23 DIAGNOSIS — Z41.9 ENCOUNTER FOR PROCEDURE FOR PURPOSES OTHER THAN REMEDYING HEALTH STATE, UNSPECIFIED: ICD-10-CM

## 2019-04-23 PROCEDURE — ? FRAXEL RESTORE DUAL

## 2019-04-23 ASSESSMENT — LOCATION SIMPLE DESCRIPTION DERM: LOCATION SIMPLE: SUPERIOR FOREHEAD

## 2019-04-23 ASSESSMENT — LOCATION ZONE DERM: LOCATION ZONE: FACE

## 2019-04-23 ASSESSMENT — LOCATION DETAILED DESCRIPTION DERM: LOCATION DETAILED: SUPERIOR MID FOREHEAD

## 2019-04-23 NOTE — PROCEDURE: FRAXEL RESTORE DUAL
Eye Shielding Text (Leave Blank If Unwanted- Will Be Inserted If Selecting Eye Shields): The intraocular eye shields were placed. 2 drops of intraocular tetracaine HCL ophthalmic 0.5% solution was administered. The eye shields were coated with ophthalmic bacitracin prior to insertion. After the shields were removed the eyes were flushed with normal saline.
Detail Level: Zone
Length Topical Anesthesia Applied (Optional): 60 minutes
Topical Anesthesia?: 23% lidocaine, 7% tetracaine
Post-Care Instructions: I reviewed with the patient in detail post-care instructions.
Treatment Number (Optional): 15
Consent: Written consent obtained, risks reviewed including but not limited to crusting, scabbing, blistering, scarring, darker or lighter pigmentary change, and/or incomplete removal.

## 2019-04-23 NOTE — HPI: COSMETIC (LASER RESURFACING)
Have You Had Laser Resurfacing Before?: has had previous treatments
When Was Your Last Laser Resurfacing Treatment?: 1/22/19

## 2019-05-07 ENCOUNTER — APPOINTMENT (RX ONLY)
Dept: URBAN - METROPOLITAN AREA CLINIC 35 | Facility: CLINIC | Age: 64
Setting detail: DERMATOLOGY
End: 2019-05-07

## 2019-05-07 DIAGNOSIS — Z41.9 ENCOUNTER FOR PROCEDURE FOR PURPOSES OTHER THAN REMEDYING HEALTH STATE, UNSPECIFIED: ICD-10-CM

## 2019-05-07 PROCEDURE — ? FRAXEL RESTORE DUAL

## 2019-05-07 ASSESSMENT — LOCATION SIMPLE DESCRIPTION DERM
LOCATION SIMPLE: LEFT FOREHEAD
LOCATION SIMPLE: RIGHT FOREHEAD

## 2019-05-07 ASSESSMENT — LOCATION DETAILED DESCRIPTION DERM
LOCATION DETAILED: LEFT INFERIOR FOREHEAD
LOCATION DETAILED: RIGHT MEDIAL FOREHEAD

## 2019-05-07 ASSESSMENT — LOCATION ZONE DERM: LOCATION ZONE: FACE

## 2019-05-07 NOTE — PROCEDURE: FRAXEL RESTORE DUAL
Actual Kj Used (Optional): 3.35
Length Topical Anesthesia Applied (Optional): 60 minutes
Treatment Level (Optional): 8
Topical Anesthesia?: 23% lidocaine, 7% tetracaine
Post-Care Instructions: I reviewed with the patient in detail post-care instructions.
Price (Use Numbers Only, No Special Characters Or $): 1,000.00
Detail Level: Zone
Eye Shielding Text (Leave Blank If Unwanted- Will Be Inserted If Selecting Eye Shields): The intraocular eye shields were placed. 2 drops of intraocular tetracaine HCL ophthalmic 0.5% solution was administered. The eye shields were coated with ophthalmic bacitracin prior to insertion. After the shields were removed the eyes were flushed with normal saline.
Treatment Number (Optional): 15
Consent: Written consent obtained, risks reviewed including but not limited to crusting, scabbing, blistering, scarring, darker or lighter pigmentary change, and/or incomplete removal.
Energy In Mj (Optional): 20

## 2019-05-28 DIAGNOSIS — G47.25 JET LAG: ICD-10-CM

## 2019-05-28 RX ORDER — ALPRAZOLAM 0.5 MG/1
0.5 TABLET ORAL 3 TIMES DAILY PRN
Qty: 10 TAB | Refills: 0 | Status: SHIPPED
Start: 2019-05-28 | End: 2019-06-15

## 2019-06-10 DIAGNOSIS — N52.8 OTHER MALE ERECTILE DYSFUNCTION: ICD-10-CM

## 2019-06-10 RX ORDER — TADALAFIL 5 MG/1
TABLET ORAL
Qty: 30 TAB | Refills: 12 | Status: SHIPPED | OUTPATIENT
Start: 2019-06-10 | End: 2019-07-30 | Stop reason: SDUPTHER

## 2019-06-13 ENCOUNTER — APPOINTMENT (RX ONLY)
Dept: URBAN - METROPOLITAN AREA CLINIC 35 | Facility: CLINIC | Age: 64
Setting detail: DERMATOLOGY
End: 2019-06-13

## 2019-06-13 DIAGNOSIS — Z41.9 ENCOUNTER FOR PROCEDURE FOR PURPOSES OTHER THAN REMEDYING HEALTH STATE, UNSPECIFIED: ICD-10-CM

## 2019-06-13 PROCEDURE — ? BOTOX

## 2019-06-13 PROCEDURE — ? ADDITIONAL NOTES

## 2019-06-13 NOTE — PROCEDURE: BOTOX
Reconstitution Date (Optional): 6/13/2019
Expiration Date (Month Year): 01/2021
Additional Area 1 Location: Glabella
Levator Labii Superioris Units: 0
Additional Area 1 Units: 25
Additional Area 2 Location: Crows Feet
Additional Area 3 Location: Frontalis
Dilution (U/0.1 Cc): 5
Additional Area 3 Units: 20
Additional Area 5 Location: perioral
Additional Area 6 Location: platysma
Lot #: [id number]
Detail Level: Detailed
Additional Area 2 Units: 30
Additional Area 4 Location: Bunny lines
Price (Use Numbers Only, No Special Characters Or $): 900
Post-Care Instructions: Patient instructed to not lie down for 4 hours after injections and limit physical activity for 24 hours.
Consent: Verbal and written informed consent were obtained to include the following risks: pain, swelling, bruising, eyelid or eyebrow droop, and lack of visible improvement of wrinkles in the areas treated.  The skin was cleansed with alcohol. Injections were administered with a 32g needle into the following areas:

## 2019-07-23 ENCOUNTER — HOSPITAL ENCOUNTER (OUTPATIENT)
Facility: MEDICAL CENTER | Age: 64
End: 2019-07-23
Attending: FAMILY MEDICINE
Payer: COMMERCIAL

## 2019-07-23 ENCOUNTER — NON-PROVIDER VISIT (OUTPATIENT)
Dept: OTHER | Facility: MEDICAL CENTER | Age: 64
End: 2019-07-23
Payer: COMMERCIAL

## 2019-07-23 DIAGNOSIS — I70.0 ABDOMINAL AORTIC ATHEROSCLEROSIS (HCC): ICD-10-CM

## 2019-07-23 DIAGNOSIS — I65.23 ATHEROSCLEROSIS OF BOTH CAROTID ARTERIES: ICD-10-CM

## 2019-07-23 DIAGNOSIS — E55.9 VITAMIN D DEFICIENCY: ICD-10-CM

## 2019-07-23 DIAGNOSIS — E78.2 MIXED HYPERLIPIDEMIA WITH APOLIPOPROTEIN E3 VARIANT: ICD-10-CM

## 2019-07-23 DIAGNOSIS — I25.84 CORONARY ATHEROSCLEROSIS DUE TO CALCIFIED CORONARY LESION: ICD-10-CM

## 2019-07-23 DIAGNOSIS — I25.10 CORONARY ATHEROSCLEROSIS DUE TO CALCIFIED CORONARY LESION: ICD-10-CM

## 2019-07-23 DIAGNOSIS — R74.8 ELEVATED CPK: ICD-10-CM

## 2019-07-23 DIAGNOSIS — I63.219 CEREBROVASCULAR ACCIDENT (CVA) DUE TO OCCLUSION OF VERTEBRAL ARTERY, UNSPECIFIED BLOOD VESSEL LATERALITY (HCC): ICD-10-CM

## 2019-07-23 DIAGNOSIS — R93.1 AGATSTON CAC SCORE 100-199: ICD-10-CM

## 2019-07-23 LAB
25(OH)D3 SERPL-MCNC: 34 NG/ML (ref 30–100)
CHOLEST SERPL-MCNC: 148 MG/DL (ref 100–199)
CK SERPL-CCNC: 166 U/L (ref 0–154)
HDLC SERPL-MCNC: 49 MG/DL
LDLC SERPL CALC-MCNC: 78 MG/DL
TRIGL SERPL-MCNC: 106 MG/DL (ref 0–149)

## 2019-07-30 ENCOUNTER — OFFICE VISIT (OUTPATIENT)
Dept: OTHER | Facility: MEDICAL CENTER | Age: 64
End: 2019-07-30
Payer: COMMERCIAL

## 2019-07-30 VITALS
OXYGEN SATURATION: 93 % | WEIGHT: 164.8 LBS | TEMPERATURE: 99.4 F | HEIGHT: 69 IN | HEART RATE: 76 BPM | DIASTOLIC BLOOD PRESSURE: 70 MMHG | BODY MASS INDEX: 24.41 KG/M2 | RESPIRATION RATE: 14 BRPM | SYSTOLIC BLOOD PRESSURE: 102 MMHG

## 2019-07-30 DIAGNOSIS — I70.0 ABDOMINAL AORTIC ATHEROSCLEROSIS (HCC): ICD-10-CM

## 2019-07-30 DIAGNOSIS — N52.8 OTHER MALE ERECTILE DYSFUNCTION: ICD-10-CM

## 2019-07-30 DIAGNOSIS — E78.2 MIXED HYPERLIPIDEMIA WITH APOLIPOPROTEIN E3 VARIANT: ICD-10-CM

## 2019-07-30 DIAGNOSIS — L40.9 PSORIASIS: ICD-10-CM

## 2019-07-30 DIAGNOSIS — E06.3 AUTOIMMUNE HYPOTHYROIDISM: ICD-10-CM

## 2019-07-30 DIAGNOSIS — I10 HTN (HYPERTENSION), BENIGN: ICD-10-CM

## 2019-07-30 PROCEDURE — 99214 OFFICE O/P EST MOD 30 MIN: CPT | Performed by: FAMILY MEDICINE

## 2019-07-30 RX ORDER — BENAZEPRIL HYDROCHLORIDE 20 MG/1
20 TABLET ORAL
Qty: 90 TAB | Refills: 4 | Status: SHIPPED | OUTPATIENT
Start: 2019-07-30 | End: 2019-11-01

## 2019-07-30 RX ORDER — CALCIPOTRIENE 50 UG/G
2 CREAM TOPICAL 2 TIMES DAILY
Qty: 120 G | Refills: 12 | Status: SHIPPED | OUTPATIENT
Start: 2019-07-30 | End: 2020-04-18 | Stop reason: SDUPTHER

## 2019-07-30 RX ORDER — ROSUVASTATIN CALCIUM 10 MG/1
10 TABLET, COATED ORAL
Qty: 90 TAB | Refills: 3 | Status: SHIPPED | OUTPATIENT
Start: 2019-07-30 | End: 2020-01-22

## 2019-07-30 RX ORDER — LEVOTHYROXINE SODIUM 88 UG/1
88 TABLET ORAL EVERY MORNING
Qty: 90 TAB | Refills: 4 | Status: SHIPPED | OUTPATIENT
Start: 2019-07-30 | End: 2020-01-31

## 2019-07-30 RX ORDER — TADALAFIL 5 MG/1
5 TABLET ORAL PRN
Qty: 6 TAB | Refills: 12 | Status: SHIPPED | OUTPATIENT
Start: 2019-07-30 | End: 2019-07-30 | Stop reason: SDUPTHER

## 2019-07-30 RX ORDER — TADALAFIL 5 MG/1
5 TABLET ORAL PRN
Qty: 18 TAB | Refills: 4 | Status: SHIPPED | OUTPATIENT
Start: 2019-07-30 | End: 2019-10-28

## 2019-07-30 NOTE — PROGRESS NOTES
SUBJECTIVE:    Chief Complaint   Patient presents with   • Hyperlipidemia   • Medication Refill   • Other     Atherosclerosis       Adal Dobbins is a 64 y.o. male,   Established Patient     PROBLEM #1-HISTORY OF PRESENT ILLNESS  Existing Problem, but requiring re-evaluation  PATIENT STATEMENT OF PROBLEM - Cardio-metabolic related issues  ONSET - years  COURSE - Asymptomatic. On statin, ACEi, low dose aspirin. Very healthy diet. Lipids/Lipoproteins mostly improved. Counseled. He does smoke about 1 cigar per week.   INTENSITY/STATUS/LOCATION/RADIATION - mild/ improved  AGGRAVATORS - Multifactorial   RELIEVERS - meds, Therapeutic Lifestyle Changes   TREATMENTS/COMPLIANCE/@GOAL? - as above/ mostly compliant/ clinically yes    No Known Allergies    Patient Active Problem List    Diagnosis Date Noted   • Executive History and Physical Examination 03/04/2019   • Elevated Lp(a) 03/04/2019   • Need for shingles vaccine 03/04/2019   • RBBB 11/16/2017   • Right Liver hemangiomas per Ultrasound 11/16/2017   • Mild Chronic inactive gastritis without bleeding (per 2017 EGD) 10/09/2017   • Colon polyp (per 2017 Colonoscopy, negative for Adenomatous change or malignancy) 10/09/2017   • Abdominal aortic atherosclerosis (HCC) 04/19/2017   • Atherosclerosis of both carotid arteries, mild, per Ultrasound 04/19/2017   • Agatston CAC score 100-199 02/27/2017   • Essential hypertension 02/27/2017   • Vitamin D deficiency 02/27/2017   • Autoimmune hypothyroidism 07/01/2016   • Erectile dysfunction 09/18/2014   • Elevated TSH 09/18/2014   • Elevated CPK 09/18/2014   • Elevated LFTs 09/18/2014   • Coronary atherosclerosis due to calcified coronary lesion 02/14/2014   • Mixed hyperlipidemia with apolipoprotein E3 variant 10/09/2013   • ASTHMA    • Psoriasis    • Family history of hypertension    • History of stroke from Vertebral Artery Dissection        Outpatient Encounter Medications as of 7/30/2019   Medication Sig Dispense Refill    • benazepril (LOTENSIN) 20 MG Tab Take 1 Tab by mouth every day. 90 Tab 4   • calcipotriene (DOVONEX) 0.005 % Cream Apply 2 g to affected area(s) 2 times a day. 120 g 12   • rosuvastatin (CRESTOR) 10 MG Tab Take 1 Tab by mouth every day. 90 Tab 3   • levothyroxine (SYNTHROID) 88 MCG Tab Take 1 Tab by mouth every morning. ON A EMPTY STOMACH 90 Tab 4   • tadalafil (CIALIS) 5 MG tablet Take 1 Tab by mouth as needed for Erectile Dysfunction for up to 90 days. 18 Tab 4   • Cholecalciferol (VITAMIN D) 2000 UNITS Cap Take 1 Cap by mouth every day.     • aspirin EC (ECOTRIN) 81 MG Tablet Delayed Response Take 2 Tabs by mouth every day.     • [DISCONTINUED] tadalafil (CIALIS) 5 MG tablet Take 1 Tab by mouth as needed for Erectile Dysfunction for up to 30 days. 6 Tab 12   • [DISCONTINUED] benazepril (LOTENSIN) 20 MG Tab TAKE 1 TABLET BY MOUTH EVERY DAY 90 Tab 0   • [DISCONTINUED] tadalafil (CIALIS) 5 MG tablet TAKE 1 TABLET BY MOUTH DAILY AS NEEDED FOR ERECTILE DYSFUNCTION 30 Tab 12   • [DISCONTINUED] benazepril (LOTENSIN) 20 MG Tab TAKE 1 TABLET BY MOUTH EVERY DAY 90 Tab 4   • [DISCONTINUED] calcipotriene (DOVONEX) 0.005 % Cream APPLY 2 GRAMS TWICE DAILY 120 g 4   • [DISCONTINUED] tadalafil (CIALIS) 5 MG tablet Take 1 Tab by mouth as needed for Erectile Dysfunction. 30 Tab 1   • [DISCONTINUED] rosuvastatin (CRESTOR) 10 MG Tab TAKE 1 TABLET BY MOUTH EVERY DAY 90 Tab 3   • [DISCONTINUED] levothyroxine (SYNTHROID) 88 MCG Tab TAKE 1 TABLET BY MOUTH EVERY MORNING ON AN EMPTY STOMACH 90 Tab 4     No facility-administered encounter medications on file as of 7/30/2019.        Social History     Tobacco Use   • Smoking status: Light Tobacco Smoker     Types: Cigars   • Smokeless tobacco: Never Used   • Tobacco comment: 1 cigar per week   Substance Use Topics   • Alcohol use: Yes     Alcohol/week: 8.4 oz     Types: 14 Standard drinks or equivalent per week   • Drug use: No       Family History   Problem Relation Age of Onset   •  "Hypertension Mother    • Other Father 45        Viral encephalitis       Patient's Past, Social, and Family History reviewed and updated by me in EPIC today.    REVIEW OF SYMPTOMS:               Pertinent Positives as above.    All other systems reviewed and negative.     OBJECTIVE:    /70   Pulse 76   Temp 37.4 °C (99.4 °F) (*RETIRED* Temporal)   Resp 14   Ht 1.74 m (5' 8.5\")   Wt 74.8 kg (164 lb 12.8 oz)   SpO2 93%   BMI 24.69 kg/m²   Body mass index is 24.69 kg/m².    Well developed, well nourished male, no acute distress, non-ill appearing. Comfortable, appears stated age, pleasant and cooperative  HEAD: atraumatic, normocephalic   EYES: Conjunctiva normal, extra-occular movements intact, PERRLA, acuity grossly intact.   EARS/NOSE/THROAT: TM's normal, no signs or symptoms of infection, no perforation, no hemotympanum, acuity grossly intact. Oropharynx: benign, no lesions noted. Nares: benign.   NECK: supple, no adenopathy, no thyromegaly or nodules, no jugular vein distention, no carotid bruits.   CHEST/LUNGS: clear to auscultation and percussion bilaterally. No adventitious breath sounds.   CARDIOVASCULAR: regular rate and rhythm, no murmur. Point of maximum intensity not displaced. Good central and peripheral pulses.   BACK: no CVA tenderness.   ABDOMEN: soft, non-tender, non-distended, no masses, no hepatosplenomegaly. Normal active bowel tones.   : deferred.   Rectal: deferred.   Extremities: warm/well-perfused, no cyanosis, clubbing, or edema.   SKIN: clear, unbroken, no rashes, normal turgor.   Neuro: Mental Status: Alert and Oriented x 3. CN II-XII grossly intact. Gait normal. Non-focal, intact. Normal strength, sensation    ASSESSMENT:    1. Other male erectile dysfunction  tadalafil (CIALIS) 5 MG tablet    DISCONTINUED: tadalafil (CIALIS) 5 MG tablet   2. HTN (hypertension), benign  benazepril (LOTENSIN) 20 MG Tab   3. Psoriasis  calcipotriene (DOVONEX) 0.005 % Cream   4. Mixed " hyperlipidemia with apolipoprotein E3 variant  rosuvastatin (CRESTOR) 10 MG Tab   5. Autoimmune hypothyroidism  levothyroxine (SYNTHROID) 88 MCG Tab       PLAN:    Total Face-to-Face time spent with patient: 30 minutes  Amount of time spent counseling patient and/or coordinating care: 20 minutes    The nature of patient counseling as below:  -Patient Education, including below topics:  -Differential Diagnoses and treatment options discussed  -Risks, benefits, alternatives discussed  -Labs reviewed with patient in detail  -Health Maintenance Exam issues discussed  -QUIT SMOKING!!!  -Therapeutic Lifestyle Changes discussed    The nature of coordination of care as below:  -Medications added/refilled: as above  -Blood Pressure Diary  -Seek medical attention immediately if worse    FOLLOW-UP:  -patient is researching his options for a new Primary Care Provider as I have taken a new position.   -and sooner if test/consult results warrant  And for Health Care Maintenance Exams  And as needed.

## 2019-07-31 NOTE — PATIENT INSTRUCTIONS
Current Outpatient Medications Ordered in Epic   Medication Sig Dispense Refill   • benazepril (LOTENSIN) 20 MG Tab Take 1 Tab by mouth every day. 90 Tab 4   • calcipotriene (DOVONEX) 0.005 % Cream Apply 2 g to affected area(s) 2 times a day. 120 g 12   • rosuvastatin (CRESTOR) 10 MG Tab Take 1 Tab by mouth every day. 90 Tab 3   • levothyroxine (SYNTHROID) 88 MCG Tab Take 1 Tab by mouth every morning. ON A EMPTY STOMACH 90 Tab 4   • tadalafil (CIALIS) 5 MG tablet Take 1 Tab by mouth as needed for Erectile Dysfunction for up to 90 days. 18 Tab 4   • Cholecalciferol (VITAMIN D) 2000 UNITS Cap Take 1 Cap by mouth every day.     • aspirin EC (ECOTRIN) 81 MG Tablet Delayed Response Take 2 Tabs by mouth every day.       No current [facility]-ordered facility-administered medications on file.

## 2019-09-12 ENCOUNTER — APPOINTMENT (RX ONLY)
Dept: URBAN - METROPOLITAN AREA CLINIC 35 | Facility: CLINIC | Age: 64
Setting detail: DERMATOLOGY
End: 2019-09-12

## 2019-09-12 DIAGNOSIS — Z41.9 ENCOUNTER FOR PROCEDURE FOR PURPOSES OTHER THAN REMEDYING HEALTH STATE, UNSPECIFIED: ICD-10-CM

## 2019-09-12 PROCEDURE — ? BOTOX

## 2019-09-12 PROCEDURE — ? ADDITIONAL NOTES

## 2019-09-12 NOTE — PROCEDURE: BOTOX
Lot #: [id number]
Consent: Verbal and written informed consent were obtained to include the following risks: pain, swelling, bruising, eyelid or eyebrow droop, and lack of visible improvement of wrinkles in the areas treated.  The skin was cleansed with alcohol. Injections were administered with a 32g needle into the following areas:
Additional Area 5 Location: perioral
Forehead Units: 0
Expiration Date (Month Year): 01/2021
Additional Area 4 Location: Bunny lines
Additional Area 2 Location: Crows Feet
Additional Area 3 Units: 20
Post-Care Instructions: Patient instructed to not lie down for 4 hours after injections and limit physical activity for 24 hours.
Detail Level: Detailed
Additional Area 2 Units: 30
Reconstitution Date (Optional): 09/09/2019
Additional Area 1 Location: [place]
Additional Area 6 Location: platysma
Price (Use Numbers Only, No Special Characters Or $): 900
Dilution (U/0.1 Cc): 5
Additional Area 1 Units: 25
Additional Area 3 Location: Frontalis

## 2019-09-24 ENCOUNTER — APPOINTMENT (RX ONLY)
Dept: URBAN - METROPOLITAN AREA CLINIC 35 | Facility: CLINIC | Age: 64
Setting detail: DERMATOLOGY
End: 2019-09-24

## 2019-09-24 DIAGNOSIS — Z41.9 ENCOUNTER FOR PROCEDURE FOR PURPOSES OTHER THAN REMEDYING HEALTH STATE, UNSPECIFIED: ICD-10-CM

## 2019-09-24 PROCEDURE — ? FRAXEL RESTORE DUAL

## 2019-09-24 ASSESSMENT — LOCATION SIMPLE DESCRIPTION DERM: LOCATION SIMPLE: INFERIOR FOREHEAD

## 2019-09-24 ASSESSMENT — LOCATION ZONE DERM: LOCATION ZONE: FACE

## 2019-09-24 ASSESSMENT — LOCATION DETAILED DESCRIPTION DERM: LOCATION DETAILED: INFERIOR MID FOREHEAD

## 2019-09-24 NOTE — PROCEDURE: FRAXEL RESTORE DUAL
Consent: Written consent obtained, risks reviewed including but not limited to crusting, scabbing, blistering, scarring, darker or lighter pigmentary change, and/or incomplete removal.
Price (Use Numbers Only, No Special Characters Or $): 1000
Eye Shielding Text (Leave Blank If Unwanted- Will Be Inserted If Selecting Eye Shields): The intraocular eye shields were placed. 2 drops of intraocular tetracaine HCL ophthalmic 0.5% solution was administered. The eye shields were coated with ophthalmic bacitracin prior to insertion. After the shields were removed the eyes were flushed with normal saline.
Detail Level: Zone
Length Topical Anesthesia Applied (Optional): 60 minutes
Post-Care Instructions: I reviewed with the patient in detail post-care instructions.
Pre-Procedure Text: 1.5 hours numbing
Topical Anesthesia?: 23% lidocaine, 7% tetracaine

## 2019-11-01 ENCOUNTER — OFFICE VISIT (OUTPATIENT)
Dept: INTERNAL MEDICINE | Facility: IMAGING CENTER | Age: 64
End: 2019-11-01
Payer: COMMERCIAL

## 2019-11-01 VITALS
SYSTOLIC BLOOD PRESSURE: 100 MMHG | TEMPERATURE: 99.3 F | BODY MASS INDEX: 26.22 KG/M2 | DIASTOLIC BLOOD PRESSURE: 60 MMHG | RESPIRATION RATE: 14 BRPM | OXYGEN SATURATION: 95 % | HEART RATE: 88 BPM | HEIGHT: 68 IN | WEIGHT: 173 LBS

## 2019-11-01 DIAGNOSIS — E78.00 HYPERCHOLESTEROLEMIA: ICD-10-CM

## 2019-11-01 DIAGNOSIS — R79.89 ELEVATED LFTS: ICD-10-CM

## 2019-11-01 DIAGNOSIS — E03.9 HYPOTHYROIDISM (ACQUIRED): ICD-10-CM

## 2019-11-01 DIAGNOSIS — I10 ESSENTIAL HYPERTENSION: ICD-10-CM

## 2019-11-01 DIAGNOSIS — Z00.00 WELLNESS EXAMINATION: ICD-10-CM

## 2019-11-01 DIAGNOSIS — Z79.899 LONG TERM USE OF DRUG: ICD-10-CM

## 2019-11-01 DIAGNOSIS — R74.8 ELEVATED CPK: ICD-10-CM

## 2019-11-01 DIAGNOSIS — Z12.5 PROSTATE CANCER SCREENING: ICD-10-CM

## 2019-11-01 DIAGNOSIS — E55.9 VITAMIN D DEFICIENCY: ICD-10-CM

## 2019-11-01 PROCEDURE — 99214 OFFICE O/P EST MOD 30 MIN: CPT | Performed by: INTERNAL MEDICINE

## 2019-11-01 RX ORDER — FLUTICASONE PROPIONATE 110 UG/1
1 AEROSOL, METERED RESPIRATORY (INHALATION) 2 TIMES DAILY
COMMUNITY
End: 2020-04-27 | Stop reason: SDUPTHER

## 2019-11-01 RX ORDER — BENAZEPRIL HYDROCHLORIDE 10 MG/1
10 TABLET ORAL DAILY
Qty: 90 TAB | Refills: 3 | Status: SHIPPED | OUTPATIENT
Start: 2019-11-01 | End: 2020-08-07

## 2019-11-01 RX ORDER — ALBUTEROL SULFATE 90 UG/1
2 AEROSOL, METERED RESPIRATORY (INHALATION) EVERY 6 HOURS PRN
Refills: 3 | COMMUNITY
Start: 2019-10-27 | End: 2020-08-07 | Stop reason: SDUPTHER

## 2019-11-01 NOTE — PROGRESS NOTES
Established Patient Note   HPI:        Adal comes in today to establish since his physician left current practice. He is treated for hypothyroid, hypercholesterolemia and HTN.    Past Medical History:   Diagnosis Date   • ASTHMA     resolved   • Family history of hypertension    • History of stroke    • HTN (hypertension) 2011   • Psoriasis    • Stroke (HCC) 1997    Vertebral artery disection       Current Outpatient Medications   Medication Sig Dispense Refill   • albuterol 108 (90 Base) MCG/ACT Aero Soln inhalation aerosol Inhale 2 Puffs by mouth every 6 hours as needed.  3   • fluticasone (FLOVENT HFA) 110 MCG/ACT Aerosol 1 Puff by Nebulization route 2 Times a Day.     • benazepril (LOTENSIN) 10 MG Tab Take 1 Tab by mouth every day. 90 Tab 3   • calcipotriene (DOVONEX) 0.005 % Cream Apply 2 g to affected area(s) 2 times a day. 120 g 12   • rosuvastatin (CRESTOR) 10 MG Tab Take 1 Tab by mouth every day. (Patient taking differently: Take 10 mg by mouth every day. Indications: taking 5 mg/day) 90 Tab 3   • levothyroxine (SYNTHROID) 88 MCG Tab Take 1 Tab by mouth every morning. ON A EMPTY STOMACH 90 Tab 4   • Cholecalciferol (VITAMIN D) 2000 UNITS Cap Take 1 Cap by mouth every day.     • aspirin EC (ECOTRIN) 81 MG Tablet Delayed Response Take 2 Tabs by mouth every day.       No current facility-administered medications for this visit.          No Known Allergies      Social History     Tobacco Use   • Smoking status: Light Tobacco Smoker     Types: Cigars   • Smokeless tobacco: Never Used   • Tobacco comment: 1 cigar per week   Substance Use Topics   • Alcohol use: Yes     Alcohol/week: 8.4 oz     Types: 14 Standard drinks or equivalent per week   • Drug use: No       Past Surgical History:   Procedure Laterality Date   • OTHER  age 12     nasal polyps        ROS    Ambulatory Vitals  /60 (BP Location: Left arm, Patient Position: Sitting, BP Cuff Size: Adult)   Pulse 88   Temp 37.4 °C (99.3 °F) (Temporal)   " Resp 14   Ht 1.727 m (5' 8\")   Wt 78.5 kg (173 lb)   SpO2 95%   BMI 26.30 kg/m²     Physical Exam   Constitutional: He is well-developed, well-nourished, and in no distress. No distress.   Neck: No JVD present. No thyromegaly present.   Cardiovascular: Normal rate, regular rhythm, normal heart sounds and intact distal pulses. Exam reveals no gallop and no friction rub.   No murmur heard.  No carotid bruits bilaterally.   Pulmonary/Chest: Effort normal and breath sounds normal. He has no wheezes. He has no rales.   Abdominal: Soft. He exhibits no distension and no mass. There is no tenderness.   Genitourinary:   Genitourinary Comments: No flank tenderness bilaterally.   Musculoskeletal: He exhibits no edema.   No spine tenderness from cervical to lumbar.   Neurological: He is alert. No cranial nerve deficit. Gait normal. Coordination normal.   Skin: He is not diaphoretic.   Psychiatric: Mood and affect normal.       Component      Latest Ref Rng & Units 12/15/2017 12/6/2018 7/23/2019   Color        Yellow    Character        Clear    Specific Gravity      <1.035  1.009    Ph      5.0 - 8.0  7.0    Glucose      Negative mg/dL  Negative    Ketones      Negative mg/dL  Negative    Protein      Negative mg/dL  Negative    Bilirubin      Negative  Negative    Urobilinogen, Urine      Negative  0.2    Nitrite      Negative  Negative    Leukocyte Esterase      Negative  Negative    Occult Blood      Negative  Negative    Micro Urine Req        see below    Cholesterol,Tot      100 - 199 mg/dL   148   Triglycerides      0 - 149 mg/dL   106   HDL      >=40 mg/dL   49   LDL      <100 mg/dL   78   Creatinine, Urine      mg/dL  48.90    Microalbumin, Urine Random      mg/dL  <0.7    Micro Alb Creat Ratio      0 - 30 mg/g  see below    CPK Total      0 - 154 U/L 250 (H)  166 (H)   TSH      0.380 - 5.330 uIU/mL 2.730     Free T-4      0.53 - 1.43 ng/dL 0.95     Hepatitis C Antibody      Negative  Negative    25-Hydroxy   " Vitamin D 25      30 - 100 ng/mL   34     Assessment and Plan:     1. Essential hypertension  Comp Metabolic Panel    URINALYSIS    benazepril (LOTENSIN) 10 MG Tab   2. Hypothyroidism (acquired)  TSH    TRIIDOTHYRONINE   3. Hypercholesterolemia  LipoFit by NMR   4. Elevated CPK  CREATINE KINASE   5. Elevated LFTs  Comp Metabolic Panel   6. Long term use of drug  CBC WITH DIFFERENTIAL   7. Wellness examination  VITAMIN B12    CBC WITH DIFFERENTIAL   8. Vitamin D deficiency  VITAMIN D,25 HYDROXY   9. Prostate cancer screening  URINALYSIS    PROSTATE SPECIFIC AG SCREENING     Follow up in January after he has done blood work. Will decrease lotensin to 10 mg qd since systolic is on low side currently.    Toby Minor M.D.

## 2019-11-12 ENCOUNTER — APPOINTMENT (RX ONLY)
Dept: URBAN - METROPOLITAN AREA CLINIC 35 | Facility: CLINIC | Age: 64
Setting detail: DERMATOLOGY
End: 2019-11-12

## 2019-11-12 DIAGNOSIS — Z41.9 ENCOUNTER FOR PROCEDURE FOR PURPOSES OTHER THAN REMEDYING HEALTH STATE, UNSPECIFIED: ICD-10-CM

## 2019-11-12 PROCEDURE — ? FRAXEL RESTORE DUAL

## 2019-11-12 ASSESSMENT — LOCATION DETAILED DESCRIPTION DERM: LOCATION DETAILED: LEFT INFERIOR MEDIAL FOREHEAD

## 2019-11-12 ASSESSMENT — LOCATION ZONE DERM: LOCATION ZONE: FACE

## 2019-11-12 ASSESSMENT — LOCATION SIMPLE DESCRIPTION DERM: LOCATION SIMPLE: LEFT FOREHEAD

## 2019-11-12 NOTE — PROCEDURE: FRAXEL RESTORE DUAL
Topical Anesthesia?: 23% lidocaine, 7% tetracaine
Pre-Procedure Text: 1.5 hours numbing
Length Topical Anesthesia Applied (Optional): 60 minutes
Post-Care Instructions: I reviewed with the patient in detail post-care instructions.
Treatment Number (Optional): 17
Eye Shielding Text (Leave Blank If Unwanted- Will Be Inserted If Selecting Eye Shields): The intraocular eye shields were placed. 2 drops of intraocular tetracaine HCL ophthalmic 0.5% solution was administered. The eye shields were coated with ophthalmic bacitracin prior to insertion. After the shields were removed the eyes were flushed with normal saline.
Consent: Written consent obtained, risks reviewed including but not limited to crusting, scabbing, blistering, scarring, darker or lighter pigmentary change, and/or incomplete removal.
Detail Level: Zone
Price (Use Numbers Only, No Special Characters Or $): 1000

## 2019-12-20 ENCOUNTER — APPOINTMENT (RX ONLY)
Dept: URBAN - METROPOLITAN AREA CLINIC 35 | Facility: CLINIC | Age: 64
Setting detail: DERMATOLOGY
End: 2019-12-20

## 2019-12-20 DIAGNOSIS — Z41.9 ENCOUNTER FOR PROCEDURE FOR PURPOSES OTHER THAN REMEDYING HEALTH STATE, UNSPECIFIED: ICD-10-CM

## 2019-12-20 PROCEDURE — ? ADDITIONAL NOTES

## 2019-12-20 PROCEDURE — ? BOTOX

## 2019-12-20 NOTE — PROCEDURE: BOTOX
Depressor Anguli Oris Units: 0
Additional Area 1 Location: Glabella
Additional Area 3 Location: Frontalis
Additional Area 1 Units: 25
Consent: Verbal and written informed consent were obtained to include the following risks: pain, swelling, bruising, eyelid or eyebrow droop, and lack of visible improvement of wrinkles in the areas treated.  The skin was cleansed with alcohol. Injections were administered with a 32g needle into the following areas:
Additional Area 3 Units: 20
Additional Area 2 Units: 30
Additional Area 6 Location: platysma
Lot #: [id number]
Additional Area 2 Location: Crows Feet
Post-Care Instructions: Patient instructed to not lie down for 4 hours after injections and limit physical activity for 24 hours.
Detail Level: Detailed
Reconstitution Date (Optional): 12/20/19
Expiration Date (Month Year): 04/22
Price (Use Numbers Only, No Special Characters Or $): 900
Additional Area 5 Location: perioral
Dilution (U/0.1 Cc): 5
Additional Area 4 Location: Bunny lines

## 2020-01-14 ENCOUNTER — NON-PROVIDER VISIT (OUTPATIENT)
Dept: INTERNAL MEDICINE | Facility: IMAGING CENTER | Age: 65
End: 2020-01-14
Payer: COMMERCIAL

## 2020-01-14 ENCOUNTER — HOSPITAL ENCOUNTER (OUTPATIENT)
Facility: MEDICAL CENTER | Age: 65
End: 2020-01-14
Attending: INTERNAL MEDICINE
Payer: COMMERCIAL

## 2020-01-14 DIAGNOSIS — Z12.5 PROSTATE CANCER SCREENING: ICD-10-CM

## 2020-01-14 DIAGNOSIS — R74.8 ELEVATED CPK: ICD-10-CM

## 2020-01-14 DIAGNOSIS — E55.9 VITAMIN D DEFICIENCY: ICD-10-CM

## 2020-01-14 DIAGNOSIS — Z00.00 WELLNESS EXAMINATION: ICD-10-CM

## 2020-01-14 DIAGNOSIS — R79.89 ELEVATED LFTS: ICD-10-CM

## 2020-01-14 DIAGNOSIS — Z01.89 ENCOUNTER FOR ROUTINE LABORATORY TESTING: ICD-10-CM

## 2020-01-14 DIAGNOSIS — I10 ESSENTIAL HYPERTENSION: ICD-10-CM

## 2020-01-14 DIAGNOSIS — E03.9 HYPOTHYROIDISM (ACQUIRED): ICD-10-CM

## 2020-01-14 DIAGNOSIS — Z79.899 LONG TERM USE OF DRUG: ICD-10-CM

## 2020-01-14 DIAGNOSIS — E78.00 HYPERCHOLESTEROLEMIA: ICD-10-CM

## 2020-01-14 LAB
25(OH)D3 SERPL-MCNC: 36 NG/ML (ref 30–100)
ALBUMIN SERPL BCP-MCNC: 4.1 G/DL (ref 3.2–4.9)
ALBUMIN/GLOB SERPL: 1.7 G/DL
ALP SERPL-CCNC: 43 U/L (ref 30–99)
ALT SERPL-CCNC: 18 U/L (ref 2–50)
ANION GAP SERPL CALC-SCNC: 5 MMOL/L (ref 0–11.9)
APPEARANCE UR: CLEAR
AST SERPL-CCNC: 23 U/L (ref 12–45)
BASOPHILS # BLD AUTO: 1.7 % (ref 0–1.8)
BASOPHILS # BLD: 0.09 K/UL (ref 0–0.12)
BILIRUB SERPL-MCNC: 1 MG/DL (ref 0.1–1.5)
BILIRUB UR QL STRIP.AUTO: NEGATIVE
BUN SERPL-MCNC: 13 MG/DL (ref 8–22)
CALCIUM SERPL-MCNC: 9.3 MG/DL (ref 8.5–10.5)
CHLORIDE SERPL-SCNC: 108 MMOL/L (ref 96–112)
CK SERPL-CCNC: 169 U/L (ref 0–154)
CO2 SERPL-SCNC: 28 MMOL/L (ref 20–33)
COLOR UR: YELLOW
CREAT SERPL-MCNC: 0.9 MG/DL (ref 0.5–1.4)
EOSINOPHIL # BLD AUTO: 0.32 K/UL (ref 0–0.51)
EOSINOPHIL NFR BLD: 6.2 % (ref 0–6.9)
ERYTHROCYTE [DISTWIDTH] IN BLOOD BY AUTOMATED COUNT: 44.2 FL (ref 35.9–50)
GLOBULIN SER CALC-MCNC: 2.4 G/DL (ref 1.9–3.5)
GLUCOSE SERPL-MCNC: 88 MG/DL (ref 65–99)
GLUCOSE UR STRIP.AUTO-MCNC: NEGATIVE MG/DL
HCT VFR BLD AUTO: 46.9 % (ref 42–52)
HGB BLD-MCNC: 15.5 G/DL (ref 14–18)
IMM GRANULOCYTES # BLD AUTO: 0.01 K/UL (ref 0–0.11)
IMM GRANULOCYTES NFR BLD AUTO: 0.2 % (ref 0–0.9)
KETONES UR STRIP.AUTO-MCNC: NEGATIVE MG/DL
LEUKOCYTE ESTERASE UR QL STRIP.AUTO: NEGATIVE
LYMPHOCYTES # BLD AUTO: 1.97 K/UL (ref 1–4.8)
LYMPHOCYTES NFR BLD: 38.1 % (ref 22–41)
MCH RBC QN AUTO: 30.6 PG (ref 27–33)
MCHC RBC AUTO-ENTMCNC: 33 G/DL (ref 33.7–35.3)
MCV RBC AUTO: 92.7 FL (ref 81.4–97.8)
MICRO URNS: NORMAL
MONOCYTES # BLD AUTO: 0.64 K/UL (ref 0–0.85)
MONOCYTES NFR BLD AUTO: 12.4 % (ref 0–13.4)
NEUTROPHILS # BLD AUTO: 2.14 K/UL (ref 1.82–7.42)
NEUTROPHILS NFR BLD: 41.4 % (ref 44–72)
NITRITE UR QL STRIP.AUTO: NEGATIVE
NRBC # BLD AUTO: 0 K/UL
NRBC BLD-RTO: 0 /100 WBC
PH UR STRIP.AUTO: 6.5 [PH] (ref 5–8)
PLATELET # BLD AUTO: 221 K/UL (ref 164–446)
PMV BLD AUTO: 12.1 FL (ref 9–12.9)
POTASSIUM SERPL-SCNC: 4.4 MMOL/L (ref 3.6–5.5)
PROT SERPL-MCNC: 6.5 G/DL (ref 6–8.2)
PROT UR QL STRIP: NEGATIVE MG/DL
PSA SERPL-MCNC: 3.03 NG/ML (ref 0–4)
RBC # BLD AUTO: 5.06 M/UL (ref 4.7–6.1)
RBC UR QL AUTO: NEGATIVE
SODIUM SERPL-SCNC: 141 MMOL/L (ref 135–145)
SP GR UR STRIP.AUTO: 1.01
T3 SERPL-MCNC: 86.5 NG/DL (ref 60–181)
TSH SERPL DL<=0.005 MIU/L-ACNC: 2.47 UIU/ML (ref 0.38–5.33)
UROBILINOGEN UR STRIP.AUTO-MCNC: 0.2 MG/DL
VIT B12 SERPL-MCNC: 305 PG/ML (ref 211–911)
WBC # BLD AUTO: 5.2 K/UL (ref 4.8–10.8)

## 2020-01-14 PROCEDURE — 82306 VITAMIN D 25 HYDROXY: CPT

## 2020-01-14 PROCEDURE — 82550 ASSAY OF CK (CPK): CPT

## 2020-01-14 PROCEDURE — 82607 VITAMIN B-12: CPT

## 2020-01-14 PROCEDURE — 83704 LIPOPROTEIN BLD QUAN PART: CPT

## 2020-01-14 PROCEDURE — 84480 ASSAY TRIIODOTHYRONINE (T3): CPT

## 2020-01-14 PROCEDURE — 84153 ASSAY OF PSA TOTAL: CPT

## 2020-01-14 PROCEDURE — 80053 COMPREHEN METABOLIC PANEL: CPT

## 2020-01-14 PROCEDURE — 81003 URINALYSIS AUTO W/O SCOPE: CPT

## 2020-01-14 PROCEDURE — 80061 LIPID PANEL: CPT

## 2020-01-14 PROCEDURE — 84443 ASSAY THYROID STIM HORMONE: CPT

## 2020-01-14 PROCEDURE — 85025 COMPLETE CBC W/AUTO DIFF WBC: CPT

## 2020-01-17 LAB
CHOLEST SERPL-MCNC: 180 MG/DL
HDL PARTICAL NO Q4363: 37 UMOL/L
HDL SIZE Q4361: 8.9 NM
HDLC SERPL-MCNC: 60 MG/DL (ref 40–59)
HLD.LARGE SERPL-SCNC: 7.5 UMOL/L
L VLDL PART NO Q4357: ABNORMAL NMOL/L
LDL SERPL QN: 21.1 NM
LDL SERPL-SCNC: 1251 NMOL/L
LDL SMALL SERPL-SCNC: <165 NMOL/L
LDLC SERPL CALC-MCNC: 92 MG/DL
PATHOLOGY STUDY: ABNORMAL
TRIGL SERPL-MCNC: 140 MG/DL (ref 30–149)
VLDL SIZE Q4362: 35 NM

## 2020-01-22 DIAGNOSIS — E78.2 MIXED HYPERLIPIDEMIA WITH APOLIPOPROTEIN E3 VARIANT: ICD-10-CM

## 2020-01-22 RX ORDER — ROSUVASTATIN CALCIUM 10 MG/1
TABLET, COATED ORAL
Qty: 90 TAB | Refills: 3 | Status: SHIPPED | OUTPATIENT
Start: 2020-01-22 | End: 2021-04-23

## 2020-01-31 ENCOUNTER — OFFICE VISIT (OUTPATIENT)
Dept: INTERNAL MEDICINE | Facility: IMAGING CENTER | Age: 65
End: 2020-01-31
Payer: COMMERCIAL

## 2020-01-31 VITALS
HEIGHT: 68 IN | DIASTOLIC BLOOD PRESSURE: 68 MMHG | WEIGHT: 176 LBS | BODY MASS INDEX: 26.67 KG/M2 | OXYGEN SATURATION: 96 % | TEMPERATURE: 97.9 F | SYSTOLIC BLOOD PRESSURE: 108 MMHG | HEART RATE: 60 BPM | RESPIRATION RATE: 14 BRPM

## 2020-01-31 DIAGNOSIS — N52.9 ERECTILE DYSFUNCTION, UNSPECIFIED ERECTILE DYSFUNCTION TYPE: ICD-10-CM

## 2020-01-31 DIAGNOSIS — E78.00 HYPERCHOLESTEROLEMIA: ICD-10-CM

## 2020-01-31 DIAGNOSIS — Z79.899 LONG TERM USE OF DRUG: ICD-10-CM

## 2020-01-31 DIAGNOSIS — Z23 NEED FOR SHINGLES VACCINE: ICD-10-CM

## 2020-01-31 DIAGNOSIS — E03.9 HYPOTHYROIDISM (ACQUIRED): ICD-10-CM

## 2020-01-31 DIAGNOSIS — I10 ESSENTIAL HYPERTENSION: ICD-10-CM

## 2020-01-31 PROCEDURE — 99214 OFFICE O/P EST MOD 30 MIN: CPT | Mod: 25 | Performed by: INTERNAL MEDICINE

## 2020-01-31 PROCEDURE — 90471 IMMUNIZATION ADMIN: CPT | Performed by: INTERNAL MEDICINE

## 2020-01-31 PROCEDURE — 90750 HZV VACC RECOMBINANT IM: CPT | Performed by: INTERNAL MEDICINE

## 2020-01-31 RX ORDER — TADALAFIL 5 MG/1
5 TABLET ORAL PRN
COMMUNITY
Start: 2020-01-21 | End: 2020-01-31

## 2020-01-31 RX ORDER — LEVOTHYROXINE SODIUM 100 MCG
100 TABLET ORAL
Qty: 90 TAB | Refills: 3 | Status: SHIPPED | OUTPATIENT
Start: 2020-01-31 | End: 2020-08-27

## 2020-01-31 RX ORDER — VITAMIN B COMPLEX
1 CAPSULE ORAL
Qty: 100 CAP | Refills: 3 | COMMUNITY
Start: 2020-01-31

## 2020-01-31 RX ORDER — TADALAFIL 20 MG/1
20 TABLET ORAL PRN
Qty: 10 TAB | Refills: 5 | Status: SHIPPED | OUTPATIENT
Start: 2020-01-31 | End: 2020-11-25

## 2020-01-31 ASSESSMENT — PATIENT HEALTH QUESTIONNAIRE - PHQ9: CLINICAL INTERPRETATION OF PHQ2 SCORE: 0

## 2020-01-31 ASSESSMENT — ENCOUNTER SYMPTOMS: DIZZINESS: 0

## 2020-01-31 NOTE — PROGRESS NOTES
Established Patient Note   HPI:        Adal is here today to follow up HTN, hypothyroid, hyperlipidemia and to review recent lab work done. He cut his benazepril down to 10 mg qd back in November as suggested. He states cialis 5 mg does not work as well as it had before.    Past Medical History:   Diagnosis Date   • ASTHMA     resolved   • Family history of hypertension    • History of stroke    • HTN (hypertension) 2011   • Hyperlipidemia    • Hypothyroid 7/1/2016   • Psoriasis    • Stroke (HCC) 1997    Vertebral artery disection       Current Outpatient Medications   Medication Sig Dispense Refill   • B Complex Cap Take 1 Cap by mouth every day. 100 Cap 3   • SYNTHROID 100 MCG Tab Take 1 Tab by mouth Every morning on an empty stomach. 90 Tab 3   • tadalafil (CIALIS) 20 MG tablet Take 1 Tab by mouth as needed for Erectile Dysfunction. 10 Tab 5   • rosuvastatin (CRESTOR) 10 MG Tab TAKE 1 TABLET BY MOUTH EVERY DAY (Patient taking differently: Take 5 mg by mouth every day.) 90 Tab 3   • albuterol 108 (90 Base) MCG/ACT Aero Soln inhalation aerosol Inhale 2 Puffs by mouth every 6 hours as needed.  3   • fluticasone (FLOVENT HFA) 110 MCG/ACT Aerosol 1 Puff by Nebulization route 2 Times a Day.     • benazepril (LOTENSIN) 10 MG Tab Take 1 Tab by mouth every day. 90 Tab 3   • calcipotriene (DOVONEX) 0.005 % Cream Apply 2 g to affected area(s) 2 times a day. 120 g 12   • Cholecalciferol (VITAMIN D) 2000 UNITS Cap Take 1 Cap by mouth every day.     • aspirin EC (ECOTRIN) 81 MG Tablet Delayed Response Take 81 mg by mouth every day.       No current facility-administered medications for this visit.          No Known Allergies      Social History     Tobacco Use   • Smoking status: Light Tobacco Smoker     Types: Cigars   • Smokeless tobacco: Never Used   • Tobacco comment: 1 cigar per week   Substance Use Topics   • Alcohol use: Yes     Alcohol/week: 8.4 oz     Types: 14 Standard drinks or equivalent per week   • Drug use:  "No       Past Surgical History:   Procedure Laterality Date   • OTHER  age 12     nasal polyps        Review of Systems   Constitutional: Negative for malaise/fatigue.   Genitourinary: Negative for dysuria.        Nocturia typically once a night. No straining to urinate.   Neurological: Negative for dizziness.       Ambulatory Vitals  /68 (BP Location: Left arm, Patient Position: Sitting, BP Cuff Size: Adult)   Pulse 60   Temp 36.6 °C (97.9 °F) (Temporal)   Resp 14   Ht 1.727 m (5' 8\")   Wt 79.8 kg (176 lb)   SpO2 96%   BMI 26.76 kg/m²     Physical Exam   Constitutional: He is well-developed, well-nourished, and in no distress. No distress.   Cardiovascular: Normal rate, regular rhythm and normal heart sounds. Exam reveals no gallop and no friction rub.   No murmur heard.  Pulmonary/Chest: Effort normal and breath sounds normal. He has no wheezes. He has no rales.   Genitourinary:    Genitourinary Comments: Prostate moderately enlarged     Musculoskeletal:         General: No edema.   Skin: He is not diaphoretic.   Psoriatic rash buttocks and back of upper legs       Component      Latest Ref Rng & Units 1/14/2020   WBC      4.8 - 10.8 K/uL 5.2   RBC      4.70 - 6.10 M/uL 5.06   Hemoglobin      14.0 - 18.0 g/dL 15.5   Hematocrit      42.0 - 52.0 % 46.9   MCV      81.4 - 97.8 fL 92.7   MCH      27.0 - 33.0 pg 30.6   MCHC      33.7 - 35.3 g/dL 33.0 (L)   RDW      35.9 - 50.0 fL 44.2   Platelet Count      164 - 446 K/uL 221   MPV      9.0 - 12.9 fL 12.1   Neutrophils-Polys      44.00 - 72.00 % 41.40 (L)   Lymphocytes      22.00 - 41.00 % 38.10   Monocytes      0.00 - 13.40 % 12.40   Eosinophils      0.00 - 6.90 % 6.20   Basophils      0.00 - 1.80 % 1.70   Immature Granulocytes      0.00 - 0.90 % 0.20   Nucleated RBC      /100 WBC 0.00   Neutrophils (Absolute)      1.82 - 7.42 K/uL 2.14   Lymphs (Absolute)      1.00 - 4.80 K/uL 1.97   Monos (Absolute)      0.00 - 0.85 K/uL 0.64   Eos (Absolute)      0.00 - " 0.51 K/uL 0.32   Baso (Absolute)      0.00 - 0.12 K/uL 0.09   Immature Granulocytes (abs)      0.00 - 0.11 K/uL 0.01   NRBC (Absolute)      K/uL 0.00   Sodium      135 - 145 mmol/L 141   Potassium      3.6 - 5.5 mmol/L 4.4   Chloride      96 - 112 mmol/L 108   Co2      20 - 33 mmol/L 28   Anion Gap      0.0 - 11.9 5.0   Glucose      65 - 99 mg/dL 88   Bun      8 - 22 mg/dL 13   Creatinine      0.50 - 1.40 mg/dL 0.90   Calcium      8.5 - 10.5 mg/dL 9.3   AST(SGOT)      12 - 45 U/L 23   ALT(SGPT)      2 - 50 U/L 18   Alkaline Phosphatase      30 - 99 U/L 43   Total Bilirubin      0.1 - 1.5 mg/dL 1.0   Albumin      3.2 - 4.9 g/dL 4.1   Total Protein      6.0 - 8.2 g/dL 6.5   Globulin      1.9 - 3.5 g/dL 2.4   A-G Ratio      g/dL 1.7   Color       Yellow   Character       Clear   Specific Gravity      <1.035 1.012   Ph      5.0 - 8.0 6.5   Glucose      Negative mg/dL Negative   Ketones      Negative mg/dL Negative   Protein      Negative mg/dL Negative   Bilirubin      Negative Negative   Urobilinogen, Urine      Negative 0.2   Nitrite      Negative Negative   Leukocyte Esterase      Negative Negative   Occult Blood      Negative Negative   Micro Urine Req       see below   Cholesterol,Tot      <=199 mg/dL 180   Triglycerides      30 - 149 mg/dL 140   HDL      40 - 59 mg/dL 60 (H)   LDL Cholesterol      <=129 mg/dL 92   LDL Particle      <=1135 nmol/L 1251 (H)   Small LDL      <=634 nmol/L <165   L-VLDL Particle No.      <=2.7 nmol/L Not Quant   HDL Particle No.      >=33.0 umol/L 37.0   L-HDL Particle No.      >=4.2 umol/L 7.5   LDL Particle Size      >=20.7 nm 21.1   VLDL Size      <=46.7 nm 35.0   HDL Size      >=8.9 nm 8.9   EER LipoFit by NMR       See Note   GFR If African American      >60 mL/min/1.73 m 2 >60   GFR If Non African American      >60 mL/min/1.73 m 2 >60   Vitamin B12 -True Cobalamin      211 - 911 pg/mL 305   25-Hydroxy   Vitamin D 25      30 - 100 ng/mL 36   Prostatic Specific Antigen Tot      0.00  - 4.00 ng/mL 3.03   CPK Total      0 - 154 U/L 169 (H)   TSH      0.380 - 5.330 uIU/mL 2.470   T3      60.0 - 181.0 ng/dL 86.5     Assessment and Plan:     1. Essential hypertension  Comp Metabolic Panel   2. Hypercholesterolemia  LipoFit by NMR   3. Erectile dysfunction, unspecified erectile dysfunction type  TESTOSTERONE, FREE AND TOTAL    tadalafil (CIALIS) 20 MG tablet   4. Hypothyroidism (acquired)  SYNTHROID 100 MCG Tab    TSH    TRIIDOTHYRONINE   5. Long term use of drug  VITAMIN B12    Comp Metabolic Panel    CREATINE KINASE     Start B complex qd. Change levothyroxine to synthroid and increase to 100 mcg qam since he states energy level could be better. Check B12, CMP, lipoprofile NMR, CPK, free and total testosterone, TSH/T3 in 6 months with visit 2 weeks after.      Toby Minor M.D.

## 2020-02-07 ENCOUNTER — APPOINTMENT (RX ONLY)
Dept: URBAN - METROPOLITAN AREA CLINIC 35 | Facility: CLINIC | Age: 65
Setting detail: DERMATOLOGY
End: 2020-02-07

## 2020-02-07 DIAGNOSIS — Z41.9 ENCOUNTER FOR PROCEDURE FOR PURPOSES OTHER THAN REMEDYING HEALTH STATE, UNSPECIFIED: ICD-10-CM

## 2020-02-07 PROCEDURE — ? ADDITIONAL NOTES

## 2020-02-07 PROCEDURE — ? MICRONEEDLING

## 2020-02-07 ASSESSMENT — LOCATION SIMPLE DESCRIPTION DERM
LOCATION SIMPLE: NOSE
LOCATION SIMPLE: LEFT CHEEK
LOCATION SIMPLE: RIGHT CHEEK
LOCATION SIMPLE: SUPERIOR FOREHEAD
LOCATION SIMPLE: CHIN

## 2020-02-07 ASSESSMENT — LOCATION DETAILED DESCRIPTION DERM
LOCATION DETAILED: SUPERIOR MID FOREHEAD
LOCATION DETAILED: LEFT CHIN
LOCATION DETAILED: RIGHT CENTRAL MALAR CHEEK
LOCATION DETAILED: NASAL TIP
LOCATION DETAILED: LEFT CENTRAL MALAR CHEEK

## 2020-02-07 ASSESSMENT — LOCATION ZONE DERM
LOCATION ZONE: FACE
LOCATION ZONE: NOSE

## 2020-02-07 NOTE — PROCEDURE: ADDITIONAL NOTES
Detail Level: Detailed
Additional Notes: 60mL of blood was drawn (8mL was anticoagulant) and spun at a concentration of 6mL. Then 1 -2cc was applied topically to face. After adding 1cc of sodium bicarbonate, 5.5 cc was injected per side to scar above right mid brow, oral commissures, Nasolabial and lateral crows feet.

## 2020-02-07 NOTE — PROCEDURE: MICRONEEDLING
Location #2: cheeks, chin
Infusions (Optional): PRP
Location #1: forehead, nose
Consent: Written consent obtained, risks reviewed including but not limited to pain, scarring, infection and incomplete improvement.  Patient understands the procedure is cosmetic in nature and will require out of pocket payment.
Depth In Mm (Location #3): 1
Length Of Topical Anesthesia Application (Optional): 90 minutes
Detail Level: Zone
Topical Anesthesia?: 23% lidocaine, 7% tetracaine
Depth In Mm (Location #1): 0.5
Depth In Mm (Location #5): 0.1
Post-Care Instructions: After the procedure, take precautions agains sun exposure. Do not apply sunscreen for 12 hours after the procedure. Do not apply make-up for 12 hours after the procedure. Avoid alcohol based toners for 10-14 days. After 2-3 days patients can return to their regular skin regimen.
Price (Use Numbers Only, No Special Characters Or $): 900

## 2020-03-13 ENCOUNTER — APPOINTMENT (RX ONLY)
Dept: URBAN - NONMETROPOLITAN AREA CLINIC 15 | Facility: CLINIC | Age: 65
Setting detail: DERMATOLOGY
End: 2020-03-13

## 2020-03-13 DIAGNOSIS — Z41.9 ENCOUNTER FOR PROCEDURE FOR PURPOSES OTHER THAN REMEDYING HEALTH STATE, UNSPECIFIED: ICD-10-CM

## 2020-03-13 PROCEDURE — ? ADDITIONAL NOTES

## 2020-03-13 PROCEDURE — ? MICRONEEDLING

## 2020-03-13 ASSESSMENT — LOCATION SIMPLE DESCRIPTION DERM
LOCATION SIMPLE: RIGHT CHEEK
LOCATION SIMPLE: NOSE
LOCATION SIMPLE: LEFT CHEEK
LOCATION SIMPLE: UPPER LIP
LOCATION SIMPLE: SUPERIOR FOREHEAD
LOCATION SIMPLE: CHIN

## 2020-03-13 ASSESSMENT — LOCATION DETAILED DESCRIPTION DERM
LOCATION DETAILED: PHILTRUM
LOCATION DETAILED: SUPERIOR MID FOREHEAD
LOCATION DETAILED: LEFT CHIN
LOCATION DETAILED: LEFT INFERIOR CENTRAL MALAR CHEEK
LOCATION DETAILED: NASAL SUPRATIP
LOCATION DETAILED: RIGHT CENTRAL MALAR CHEEK

## 2020-03-13 ASSESSMENT — LOCATION ZONE DERM
LOCATION ZONE: LIP
LOCATION ZONE: FACE
LOCATION ZONE: NOSE

## 2020-03-13 NOTE — PROCEDURE: ADDITIONAL NOTES
Detail Level: Detailed
Additional Notes: 60 ml of blood (8ml was anticoagulant) and spun at a concentration of 6 ml. Then 1-2 cc was applied topically to face. After adding 1cc of sodium bicarbonate, 5.5 cc was injected to scar above right mid brow, oral commisures, nasolabial and lateral crows feet.

## 2020-03-13 NOTE — PROCEDURE: MICRONEEDLING
Topical Anesthesia?: 23% lidocaine, 7% tetracaine
Length Of Topical Anesthesia Application (Optional): 90 minutes
Detail Level: Zone
Treatment Number (Optional): 1
Infusions (Optional): PRP
Depth In Mm (Location #1): 0.5
Price (Use Numbers Only, No Special Characters Or $): 900
Location #1: forehead, nose
Post-Care Instructions: After the procedure, take precautions agains sun exposure. Do not apply sunscreen for 12 hours after the procedure. Do not apply make-up for 12 hours after the procedure. Avoid alcohol based toners for 10-14 days. After 2-3 days patients can return to their regular skin regimen.
Depth In Mm (Location #4): 0.1
Location #2: cheeks, chin
Consent: Written consent obtained, risks reviewed including but not limited to pain, scarring, infection and incomplete improvement.  Patient understands the procedure is cosmetic in nature and will require out of pocket payment.

## 2020-04-16 ENCOUNTER — APPOINTMENT (RX ONLY)
Dept: URBAN - METROPOLITAN AREA CLINIC 35 | Facility: CLINIC | Age: 65
Setting detail: DERMATOLOGY
End: 2020-04-16

## 2020-04-16 DIAGNOSIS — Z41.9 ENCOUNTER FOR PROCEDURE FOR PURPOSES OTHER THAN REMEDYING HEALTH STATE, UNSPECIFIED: ICD-10-CM

## 2020-04-16 PROCEDURE — ? ADDITIONAL NOTES

## 2020-04-16 PROCEDURE — ? BOTOX

## 2020-04-16 NOTE — PROCEDURE: BOTOX
Right Periorbital Skin Units: 0
Consent: Verbal and written informed consent were obtained to include the following risks: pain, swelling, bruising, eyelid or eyebrow droop, and lack of visible improvement of wrinkles in the areas treated.  The skin was cleansed with alcohol. Injections were administered with a 32g needle into the following areas:
Additional Area 3 Units: 20
Reconstitution Date (Optional): 4/16/20
Dilution (U/0.1 Cc): 5
Additional Area 3 Location: Frontalis
Lot #: [id number]
Detail Level: Detailed
Additional Area 2 Units: 30
Additional Area 5 Location: perioral
Additional Area 1 Location: Glabella
Expiration Date (Month Year): 05/22
Additional Area 6 Location: platysma
Additional Area 1 Units: 25
Price (Use Numbers Only, No Special Characters Or $): 900
Post-Care Instructions: Patient instructed to not lie down for 4 hours after injections and limit physical activity for 24 hours.
Additional Area 4 Location: Bunny lines
Additional Area 2 Location: Crows Feet

## 2020-04-18 DIAGNOSIS — L40.9 PSORIASIS: ICD-10-CM

## 2020-04-20 RX ORDER — CALCIPOTRIENE 50 UG/G
2 CREAM TOPICAL 2 TIMES DAILY
Qty: 120 G | Refills: 6 | Status: SHIPPED | OUTPATIENT
Start: 2020-04-20 | End: 2021-05-10

## 2020-04-27 ENCOUNTER — PATIENT MESSAGE (OUTPATIENT)
Dept: INTERNAL MEDICINE | Facility: IMAGING CENTER | Age: 65
End: 2020-04-27

## 2020-04-27 RX ORDER — FLUTICASONE PROPIONATE 110 UG/1
1 AEROSOL, METERED RESPIRATORY (INHALATION) 2 TIMES DAILY
Qty: 1 INHALER | Refills: 3 | Status: SHIPPED | OUTPATIENT
Start: 2020-04-27 | End: 2023-11-24 | Stop reason: SDUPTHER

## 2020-07-16 ENCOUNTER — APPOINTMENT (RX ONLY)
Dept: URBAN - METROPOLITAN AREA CLINIC 35 | Facility: CLINIC | Age: 65
Setting detail: DERMATOLOGY
End: 2020-07-16

## 2020-07-16 DIAGNOSIS — D485 NEOPLASM OF UNCERTAIN BEHAVIOR OF SKIN: ICD-10-CM

## 2020-07-16 DIAGNOSIS — Z41.9 ENCOUNTER FOR PROCEDURE FOR PURPOSES OTHER THAN REMEDYING HEALTH STATE, UNSPECIFIED: ICD-10-CM

## 2020-07-16 PROBLEM — D48.5 NEOPLASM OF UNCERTAIN BEHAVIOR OF SKIN: Status: ACTIVE | Noted: 2020-07-16

## 2020-07-16 PROCEDURE — ? BIOPSY BY PUNCH METHOD

## 2020-07-16 PROCEDURE — 11104 PUNCH BX SKIN SINGLE LESION: CPT

## 2020-07-16 PROCEDURE — ? ADDITIONAL NOTES

## 2020-07-16 PROCEDURE — ? MICRONEEDLING

## 2020-07-16 PROCEDURE — 11105 PUNCH BX SKIN EA SEP/ADDL: CPT

## 2020-07-16 ASSESSMENT — LOCATION DETAILED DESCRIPTION DERM
LOCATION DETAILED: NASAL SUPRATIP
LOCATION DETAILED: PHILTRUM
LOCATION DETAILED: LEFT LATERAL TEMPLE
LOCATION DETAILED: LEFT CENTRAL ZYGOMA
LOCATION DETAILED: RIGHT CENTRAL MALAR CHEEK
LOCATION DETAILED: LEFT CHIN
LOCATION DETAILED: SUPERIOR MID FOREHEAD
LOCATION DETAILED: LEFT INFERIOR CENTRAL MALAR CHEEK

## 2020-07-16 ASSESSMENT — LOCATION SIMPLE DESCRIPTION DERM
LOCATION SIMPLE: UPPER LIP
LOCATION SIMPLE: LEFT TEMPLE
LOCATION SIMPLE: LEFT ZYGOMA
LOCATION SIMPLE: RIGHT CHEEK
LOCATION SIMPLE: SUPERIOR FOREHEAD
LOCATION SIMPLE: NOSE
LOCATION SIMPLE: CHIN
LOCATION SIMPLE: LEFT CHEEK

## 2020-07-16 ASSESSMENT — LOCATION ZONE DERM
LOCATION ZONE: FACE
LOCATION ZONE: FACE
LOCATION ZONE: LIP
LOCATION ZONE: NOSE

## 2020-07-16 NOTE — HPI: COSMETIC (MICRONEEDLING)
Have You Had Microneedling Treatments Before?: has had a previous microneedling treatment
When Was Your Last Treatment?: 3/13/20

## 2020-07-16 NOTE — PROCEDURE: BIOPSY BY PUNCH METHOD
Detail Level: Detailed
Was A Bandage Applied: Yes
Punch Size In Mm: 2
Biopsy Type: H and E
Anesthesia Type: 0.5% lidocaine with 1:200,000 epinephrine and a 1:10 solution of 8.4% sodium bicarbonate
Anesthesia Volume In Cc: 0.6
Additional Anesthesia Volume In Cc (Will Not Render If 0): 0
Hemostasis: None
Epidermal Sutures: 6-0 Nylon
Wound Care: Petrolatum
Dressing: bandage
Suture Removal: 7 days
Patient Will Remove Sutures At Home?: No
Lab: [id number]
Lab Facility: 
Consent: Written consent was obtained and risks were reviewed including but not limited to scarring, infection, bleeding, scabbing, incomplete removal, nerve damage and allergy to anesthesia.
Post-Care Instructions: I reviewed with the patient in detail post-care instructions. Patient is to keep the biopsy site dry overnight, and then change the bandage and apply petrolatum once daily until sutures are removed.  Use a clean q-tip to apply the petrolatum and avoid touching the biopsy site with your hands.  Avoid immersing in water such as bathtub or swimming pools. Any concerns about a wound infection come into the office for a walk in visit Monday through Friday 8:30 am to 12 pm or 1 pm to 4:30 pm Signs of infection include increasing pain, redness, and drainage from biopsy site.  If we are not in the office, please call through the answering service.
Home Suture Removal Text: Patient will remove their sutures at home.  If they have any questions or difficulties they will call the office.
Notification Instructions: Patient will be notified of biopsy results. However, patient instructed to call the office if not contacted within 2 weeks.
Billing Type: Third-Party Bill
Information: Selecting Yes will display possible errors in your note based on the variables you have selected. This validation is only offered as a suggestion for you. PLEASE NOTE THAT THE VALIDATION TEXT WILL BE REMOVED WHEN YOU FINALIZE YOUR NOTE. IF YOU WANT TO FAX A PRELIMINARY NOTE YOU WILL NEED TO TOGGLE THIS TO 'NO' IF YOU DO NOT WANT IT IN YOUR FAXED NOTE.

## 2020-07-16 NOTE — PROCEDURE: MICRONEEDLING
Topical Anesthesia?: 23% lidocaine, 7% tetracaine
Length Of Topical Anesthesia Application (Optional): 90 minutes
Detail Level: Zone
Treatment Number (Optional): 2
Infusions (Optional): PRP
Depth In Mm (Location #1): 0.5
Price (Use Numbers Only, No Special Characters Or $): 900
Location #1: forehead, nose
Post-Care Instructions: After the procedure, take precautions agains sun exposure. Do not apply sunscreen for 12 hours after the procedure. Do not apply make-up for 12 hours after the procedure. Avoid alcohol based toners for 10-14 days. After 2-3 days patients can return to their regular skin regimen.
Depth In Mm (Location #2): 1
Depth In Mm (Location #4): 0.1
Location #2: cheeks, chin
Consent: Written consent obtained, risks reviewed including but not limited to pain, scarring, infection and incomplete improvement.  Patient understands the procedure is cosmetic in nature and will require out of pocket payment.

## 2020-08-06 ENCOUNTER — APPOINTMENT (RX ONLY)
Dept: URBAN - METROPOLITAN AREA CLINIC 35 | Facility: CLINIC | Age: 65
Setting detail: DERMATOLOGY
End: 2020-08-06

## 2020-08-06 DIAGNOSIS — Z41.9 ENCOUNTER FOR PROCEDURE FOR PURPOSES OTHER THAN REMEDYING HEALTH STATE, UNSPECIFIED: ICD-10-CM

## 2020-08-06 PROCEDURE — ? ADDITIONAL NOTES

## 2020-08-06 PROCEDURE — ? BOTOX

## 2020-08-06 NOTE — PROCEDURE: BOTOX
Right Periorbital Skin Units: 0
Consent: Verbal and written informed consent were obtained to include the following risks: pain, swelling, bruising, eyelid or eyebrow droop, and lack of visible improvement of wrinkles in the areas treated.  The skin was cleansed with alcohol. Injections were administered with a 32g needle into the following areas:
Additional Area 3 Units: 20
Reconstitution Date (Optional): 08/06/2020
Dilution (U/0.1 Cc): 5
Additional Area 3 Location: Frontalis
Lot #: [id number]
Detail Level: Detailed
Additional Area 2 Units: 30
Additional Area 5 Location: perioral
Additional Area 1 Location: Glabella
Expiration Date (Month Year): 11/22
Additional Area 6 Location: platysma
Additional Area 1 Units: 25
Price (Use Numbers Only, No Special Characters Or $): 900
Post-Care Instructions: Patient instructed to not lie down for 4 hours after injections and limit physical activity for 24 hours.
Additional Area 4 Location: Bunny lines
Additional Area 2 Location: Crows Feet

## 2020-08-07 ENCOUNTER — OFFICE VISIT (OUTPATIENT)
Dept: INTERNAL MEDICINE | Facility: IMAGING CENTER | Age: 65
End: 2020-08-07
Payer: COMMERCIAL

## 2020-08-07 VITALS
TEMPERATURE: 99.2 F | BODY MASS INDEX: 25.46 KG/M2 | WEIGHT: 168 LBS | HEIGHT: 68 IN | DIASTOLIC BLOOD PRESSURE: 60 MMHG | HEART RATE: 70 BPM | RESPIRATION RATE: 14 BRPM | SYSTOLIC BLOOD PRESSURE: 100 MMHG | OXYGEN SATURATION: 96 %

## 2020-08-07 DIAGNOSIS — Z12.5 PROSTATE CANCER SCREENING: ICD-10-CM

## 2020-08-07 DIAGNOSIS — E55.9 VITAMIN D DEFICIENCY: ICD-10-CM

## 2020-08-07 DIAGNOSIS — E03.9 HYPOTHYROIDISM (ACQUIRED): ICD-10-CM

## 2020-08-07 DIAGNOSIS — Z79.899 LONG TERM USE OF DRUG: ICD-10-CM

## 2020-08-07 DIAGNOSIS — J45.20 MILD INTERMITTENT ASTHMA WITHOUT COMPLICATION: ICD-10-CM

## 2020-08-07 DIAGNOSIS — E78.2 MIXED HYPERLIPIDEMIA WITH APOLIPOPROTEIN E3 VARIANT: ICD-10-CM

## 2020-08-07 DIAGNOSIS — I10 ESSENTIAL HYPERTENSION: ICD-10-CM

## 2020-08-07 PROCEDURE — 99214 OFFICE O/P EST MOD 30 MIN: CPT | Mod: 25 | Performed by: INTERNAL MEDICINE

## 2020-08-07 PROCEDURE — 94010 BREATHING CAPACITY TEST: CPT | Performed by: INTERNAL MEDICINE

## 2020-08-07 RX ORDER — ALBUTEROL SULFATE 90 UG/1
2 AEROSOL, METERED RESPIRATORY (INHALATION) EVERY 6 HOURS PRN
Qty: 8.5 G | Refills: 5 | Status: SHIPPED | OUTPATIENT
Start: 2020-08-07 | End: 2023-11-24 | Stop reason: SDUPTHER

## 2020-08-07 RX ORDER — BENAZEPRIL HYDROCHLORIDE 5 MG/1
5 TABLET ORAL DAILY
Qty: 90 TAB | Refills: 3 | Status: SHIPPED | OUTPATIENT
Start: 2020-08-07 | End: 2021-08-06

## 2020-08-07 RX ORDER — ALBUTEROL SULFATE 90 UG/1
2 AEROSOL, METERED RESPIRATORY (INHALATION) EVERY 6 HOURS PRN
Qty: 8.5 G | Refills: 5 | Status: SHIPPED | OUTPATIENT
Start: 2020-08-07 | End: 2020-08-07 | Stop reason: SDUPTHER

## 2020-08-07 ASSESSMENT — FIBROSIS 4 INDEX: FIB4 SCORE: 1.55

## 2020-08-07 NOTE — PROGRESS NOTES
Established Patient Note   HPI:        Adal comes in today to follow up HTN, hyperlipidemia, hypothyroid but has not done his recent lab work as yet. He uses his albuterol inhaler 2-3 times a week. He has not been using flovent regularly; last use was one week ago.    Past Medical History:   Diagnosis Date   • ASTHMA     mild intermittent   • Family history of hypertension    • History of stroke    • HTN (hypertension) 2011   • Hyperlipidemia    • Hypothyroid 7/1/2016   • Psoriasis    • Stroke (HCC) 1997    Vertebral artery disection       Current Outpatient Medications   Medication Sig Dispense Refill   • benazepril (LOTENSIN) 5 MG Tab Take 1 Tab by mouth every day. 90 Tab 3   • albuterol 108 (90 Base) MCG/ACT Aero Soln inhalation aerosol Inhale 2 Puffs by mouth every 6 hours as needed. 8.5 g 5   • fluticasone (FLOVENT HFA) 110 MCG/ACT Aerosol Inhale 1 Puff by mouth 2 Times a Day. 1 Inhaler 3   • calcipotriene (DOVONEX) 0.005 % Cream Apply 2 g to affected area(s) 2 times a day. 120 g 6   • B Complex Cap Take 1 Cap by mouth every day. 100 Cap 3   • SYNTHROID 100 MCG Tab Take 1 Tab by mouth Every morning on an empty stomach. 90 Tab 3   • tadalafil (CIALIS) 20 MG tablet Take 1 Tab by mouth as needed for Erectile Dysfunction. 10 Tab 5   • rosuvastatin (CRESTOR) 10 MG Tab TAKE 1 TABLET BY MOUTH EVERY DAY (Patient taking differently: Take 5 mg by mouth every day.) 90 Tab 3   • Cholecalciferol (VITAMIN D) 2000 UNITS Cap Take 1 Cap by mouth every day.     • aspirin EC (ECOTRIN) 81 MG Tablet Delayed Response Take 81 mg by mouth every day.       No current facility-administered medications for this visit.          No Known Allergies      Social History     Tobacco Use   • Smoking status: Light Tobacco Smoker     Types: Cigars   • Smokeless tobacco: Never Used   • Tobacco comment: 1 cigar per week   Substance Use Topics   • Alcohol use: Yes     Alcohol/week: 8.4 oz     Types: 14 Standard drinks or equivalent per week   •  "Drug use: No       Past Surgical History:   Procedure Laterality Date   • OTHER  age 12     nasal polyps        ROS    Ambulatory Vitals  /60 (BP Location: Left arm, Patient Position: Sitting, BP Cuff Size: Adult)   Pulse 70   Temp 37.3 °C (99.2 °F) (Temporal)   Resp 14   Ht 1.727 m (5' 8\")   Wt 76.2 kg (168 lb)   SpO2 96%   BMI 25.54 kg/m²     Physical Exam   Constitutional: He is well-developed, well-nourished, and in no distress. No distress.   Cardiovascular: Normal rate, regular rhythm and normal heart sounds. Exam reveals no gallop and no friction rub.   No murmur heard.  Pulmonary/Chest: Effort normal and breath sounds normal. He has no wheezes. He has no rales.   Musculoskeletal:         General: No edema.   Neurological: He is alert. No cranial nerve deficit. Gait normal. Coordination normal.   Skin: He is not diaphoretic.     FVC 4.63 liters (108% predicted)  FEV1 3.28 liters (102% predicted)  IGB15-41% liters/sec. 2.16 liters (83% predicted)  FEV1/FVC 71%    Assessment and Plan:     1. Essential hypertension  benazepril (LOTENSIN) 5 MG Tab    Comp Metabolic Panel    controlled   2. Mild intermittent asthma without complication  albuterol 108 (90 Base) MCG/ACT Aero Soln inhalation aerosol   3. Vitamin D deficiency  VITAMIN D,25 HYDROXY    Comp Metabolic Panel   4. Mixed hyperlipidemia with apolipoprotein E3 variant  LipoFit by NMR   5. Hypothyroidism (acquired)  TSH    TRIIDOTHYRONINE   6. Long term use of drug  CBC WITH DIFFERENTIAL    Comp Metabolic Panel    CREATINE KINASE   7. Prostate cancer screening  URINALYSIS    PROSTATE SPECIFIC AG SCREENING     Discussed since BP is doing well and almost too low will decrease benazepril to 5 mg qd since he does have noncritical atherosclerosis. He will get his blood drawn next week for lab work; will discuss results on phone. Follow up 6 months.  Face to face time: 35 minutes with greater than 50% of time spent with direct patient contact and " medical management.     Toby Minor M.D.

## 2020-08-11 ENCOUNTER — HOSPITAL ENCOUNTER (OUTPATIENT)
Facility: MEDICAL CENTER | Age: 65
End: 2020-08-11
Attending: INTERNAL MEDICINE
Payer: COMMERCIAL

## 2020-08-11 ENCOUNTER — NON-PROVIDER VISIT (OUTPATIENT)
Dept: INTERNAL MEDICINE | Facility: IMAGING CENTER | Age: 65
End: 2020-08-11
Payer: COMMERCIAL

## 2020-08-11 DIAGNOSIS — Z23 NEED FOR SHINGLES VACCINE: ICD-10-CM

## 2020-08-11 DIAGNOSIS — I10 ESSENTIAL HYPERTENSION: ICD-10-CM

## 2020-08-11 DIAGNOSIS — N52.9 ERECTILE DYSFUNCTION, UNSPECIFIED ERECTILE DYSFUNCTION TYPE: ICD-10-CM

## 2020-08-11 DIAGNOSIS — E03.9 HYPOTHYROIDISM (ACQUIRED): ICD-10-CM

## 2020-08-11 DIAGNOSIS — Z79.899 LONG TERM USE OF DRUG: ICD-10-CM

## 2020-08-11 DIAGNOSIS — Z01.89 ENCOUNTER FOR ROUTINE LABORATORY TESTING: ICD-10-CM

## 2020-08-11 DIAGNOSIS — E78.00 HYPERCHOLESTEROLEMIA: ICD-10-CM

## 2020-08-11 LAB
ALBUMIN SERPL BCP-MCNC: 4.1 G/DL (ref 3.2–4.9)
ALBUMIN/GLOB SERPL: 1.5 G/DL
ALP SERPL-CCNC: 39 U/L (ref 30–99)
ALT SERPL-CCNC: 21 U/L (ref 2–50)
ANION GAP SERPL CALC-SCNC: 11 MMOL/L (ref 7–16)
AST SERPL-CCNC: 33 U/L (ref 12–45)
BILIRUB SERPL-MCNC: 0.3 MG/DL (ref 0.1–1.5)
BUN SERPL-MCNC: 15 MG/DL (ref 8–22)
CALCIUM SERPL-MCNC: 9.5 MG/DL (ref 8.5–10.5)
CHLORIDE SERPL-SCNC: 105 MMOL/L (ref 96–112)
CK SERPL-CCNC: 408 U/L (ref 0–154)
CO2 SERPL-SCNC: 24 MMOL/L (ref 20–33)
CREAT SERPL-MCNC: 0.83 MG/DL (ref 0.5–1.4)
GLOBULIN SER CALC-MCNC: 2.7 G/DL (ref 1.9–3.5)
GLUCOSE SERPL-MCNC: 83 MG/DL (ref 65–99)
POTASSIUM SERPL-SCNC: 4.5 MMOL/L (ref 3.6–5.5)
PROT SERPL-MCNC: 6.8 G/DL (ref 6–8.2)
SODIUM SERPL-SCNC: 140 MMOL/L (ref 135–145)
T3 SERPL-MCNC: 70.2 NG/DL (ref 60–181)
TSH SERPL DL<=0.005 MIU/L-ACNC: 3.56 UIU/ML (ref 0.38–5.33)
VIT B12 SERPL-MCNC: 1487 PG/ML (ref 211–911)

## 2020-08-11 PROCEDURE — 80053 COMPREHEN METABOLIC PANEL: CPT

## 2020-08-11 PROCEDURE — 84402 ASSAY OF FREE TESTOSTERONE: CPT

## 2020-08-11 PROCEDURE — 90750 HZV VACC RECOMBINANT IM: CPT | Performed by: INTERNAL MEDICINE

## 2020-08-11 PROCEDURE — 82550 ASSAY OF CK (CPK): CPT

## 2020-08-11 PROCEDURE — 83704 LIPOPROTEIN BLD QUAN PART: CPT

## 2020-08-11 PROCEDURE — 84403 ASSAY OF TOTAL TESTOSTERONE: CPT

## 2020-08-11 PROCEDURE — 80061 LIPID PANEL: CPT

## 2020-08-11 PROCEDURE — 84443 ASSAY THYROID STIM HORMONE: CPT

## 2020-08-11 PROCEDURE — 90471 IMMUNIZATION ADMIN: CPT | Performed by: INTERNAL MEDICINE

## 2020-08-11 PROCEDURE — 84480 ASSAY TRIIODOTHYRONINE (T3): CPT

## 2020-08-11 PROCEDURE — 84270 ASSAY OF SEX HORMONE GLOBUL: CPT

## 2020-08-11 PROCEDURE — 82607 VITAMIN B-12: CPT

## 2020-08-13 LAB
SHBG SERPL-SCNC: 54 NMOL/L (ref 11–80)
TESTOST FREE MFR SERPL: 1.4 % (ref 1.6–2.9)
TESTOST FREE SERPL-MCNC: 77 PG/ML (ref 47–244)
TESTOST SERPL-MCNC: 541 NG/DL (ref 300–720)

## 2020-08-14 LAB
CHOLEST SERPL-MCNC: 171 MG/DL
HDL PARTICAL NO Q4363: 35.2 UMOL/L
HDL SIZE Q4361: 8.8 NM
HDLC SERPL-MCNC: 53 MG/DL (ref 40–59)
HLD.LARGE SERPL-SCNC: 5.4 UMOL/L
L VLDL PART NO Q4357: ABNORMAL NMOL/L
LDL SERPL QN: 20.7 NM
LDL SERPL-SCNC: 1018 NMOL/L
LDL SMALL SERPL-SCNC: 392 NMOL/L
LDLC SERPL CALC-MCNC: 72 MG/DL
PATHOLOGY STUDY: ABNORMAL
TRIGL SERPL-MCNC: 231 MG/DL (ref 30–149)
VLDL SIZE Q4362: 43.9 NM

## 2020-08-27 ENCOUNTER — APPOINTMENT (RX ONLY)
Dept: URBAN - METROPOLITAN AREA CLINIC 35 | Facility: CLINIC | Age: 65
Setting detail: DERMATOLOGY
End: 2020-08-27

## 2020-08-27 DIAGNOSIS — E03.9 HYPOTHYROIDISM (ACQUIRED): ICD-10-CM

## 2020-08-27 DIAGNOSIS — L57.0 ACTINIC KERATOSIS: ICD-10-CM

## 2020-08-27 PROCEDURE — 17000 DESTRUCT PREMALG LESION: CPT

## 2020-08-27 PROCEDURE — ? LIQUID NITROGEN

## 2020-08-27 PROCEDURE — 17003 DESTRUCT PREMALG LES 2-14: CPT

## 2020-08-27 RX ORDER — LEVOTHYROXINE SODIUM 100 MCG
TABLET ORAL
Qty: 90 TAB | Refills: 3 | Status: SHIPPED | OUTPATIENT
Start: 2020-08-27 | End: 2020-08-29 | Stop reason: SDUPTHER

## 2020-08-27 ASSESSMENT — LOCATION DETAILED DESCRIPTION DERM
LOCATION DETAILED: LEFT LATERAL ZYGOMA
LOCATION DETAILED: LEFT CENTRAL TEMPLE

## 2020-08-27 ASSESSMENT — LOCATION SIMPLE DESCRIPTION DERM
LOCATION SIMPLE: LEFT ZYGOMA
LOCATION SIMPLE: LEFT TEMPLE

## 2020-08-27 ASSESSMENT — LOCATION ZONE DERM: LOCATION ZONE: FACE

## 2020-08-27 NOTE — PROCEDURE: LIQUID NITROGEN
Consent: The patient's consent was obtained including but not limited to risks of crusting, scabbing, blistering, scarring, darker or lighter pigmentary change, recurrence, incomplete removal and infection.
Render Post-Care Instructions In Note?: no
Duration Of Freeze Thaw-Cycle (Seconds): 10
Number Of Freeze-Thaw Cycles: 1 freeze-thaw cycle
Post-Care Instructions: I reviewed with the patient in detail post-care instructions. Patient is to wear sunprotection, and avoid picking at any of the treated lesions. Pt may apply Vaseline to crusted or scabbing areas.
Detail Level: Detailed

## 2020-08-29 DIAGNOSIS — E03.9 HYPOTHYROIDISM (ACQUIRED): ICD-10-CM

## 2020-08-30 RX ORDER — LEVOTHYROXINE SODIUM 100 MCG
100 TABLET ORAL EVERY MORNING
Qty: 90 TAB | Refills: 3 | Status: SHIPPED | OUTPATIENT
Start: 2020-08-30 | End: 2021-09-07

## 2020-10-15 ENCOUNTER — APPOINTMENT (RX ONLY)
Dept: URBAN - METROPOLITAN AREA CLINIC 35 | Facility: CLINIC | Age: 65
Setting detail: DERMATOLOGY
End: 2020-10-15

## 2020-10-15 DIAGNOSIS — Z41.9 ENCOUNTER FOR PROCEDURE FOR PURPOSES OTHER THAN REMEDYING HEALTH STATE, UNSPECIFIED: ICD-10-CM

## 2020-10-15 PROCEDURE — ? MICRONEEDLING

## 2020-10-15 PROCEDURE — ? ADDITIONAL NOTES

## 2020-10-15 ASSESSMENT — LOCATION DETAILED DESCRIPTION DERM
LOCATION DETAILED: NASAL SUPRATIP
LOCATION DETAILED: LEFT CHIN
LOCATION DETAILED: SUPERIOR MID FOREHEAD
LOCATION DETAILED: PHILTRUM
LOCATION DETAILED: LEFT INFERIOR CENTRAL MALAR CHEEK
LOCATION DETAILED: RIGHT CENTRAL MALAR CHEEK

## 2020-10-15 ASSESSMENT — LOCATION SIMPLE DESCRIPTION DERM
LOCATION SIMPLE: RIGHT CHEEK
LOCATION SIMPLE: UPPER LIP
LOCATION SIMPLE: CHIN
LOCATION SIMPLE: LEFT CHEEK
LOCATION SIMPLE: SUPERIOR FOREHEAD
LOCATION SIMPLE: NOSE

## 2020-10-15 ASSESSMENT — LOCATION ZONE DERM
LOCATION ZONE: FACE
LOCATION ZONE: NOSE
LOCATION ZONE: LIP

## 2020-10-15 NOTE — HPI: COSMETIC (MICRONEEDLING)
Have You Had Microneedling Treatments Before?: has had a previous microneedling treatment
When Was Your Last Treatment?: 7/16/20

## 2020-10-15 NOTE — PROCEDURE: MICRONEEDLING
Topical Anesthesia?: 23% lidocaine, 7% tetracaine
Length Of Topical Anesthesia Application (Optional): 60 minutes
Detail Level: Zone
Treatment Number (Optional): 3
Infusions (Optional): PRP
Depth In Mm (Location #1): 0.5
Price (Use Numbers Only, No Special Characters Or $): 900
Location #1: forehead, nose
Post-Care Instructions: After the procedure, take precautions agains sun exposure. Do not apply sunscreen for 12 hours after the procedure. Do not apply make-up for 12 hours after the procedure. Avoid alcohol based toners for 10-14 days. After 2-3 days patients can return to their regular skin regimen.
Depth In Mm (Location #2): 1
Depth In Mm (Location #4): 0.1
Depth In Mm (Location #3): 1.5
Location #2: chin
Location #3: cheeks
Consent: Written consent obtained, risks reviewed including but not limited to pain, scarring, infection and incomplete improvement.  Patient understands the procedure is cosmetic in nature and will require out of pocket payment.

## 2020-10-15 NOTE — PROCEDURE: ADDITIONAL NOTES
Detail Level: Detailed
Additional Notes: 60 ml of blood (8ml was anticoagulant) and spun at a concentration of 6 ml. Then 1-2 cc was applied topically to face. After adding 1cc of sodium bicarbonate, 1 cc crows feet, 1 cc right scar, 2 cc oral commisures, 2 cc nasolabial.

## 2020-11-24 ENCOUNTER — APPOINTMENT (RX ONLY)
Dept: URBAN - METROPOLITAN AREA CLINIC 35 | Facility: CLINIC | Age: 65
Setting detail: DERMATOLOGY
End: 2020-11-24

## 2020-11-24 PROCEDURE — ? BOTOX

## 2020-11-24 PROCEDURE — ? ADDITIONAL NOTES

## 2020-11-25 DIAGNOSIS — Z41.9 ENCOUNTER FOR PROCEDURE FOR PURPOSES OTHER THAN REMEDYING HEALTH STATE, UNSPECIFIED: ICD-10-CM

## 2020-11-25 DIAGNOSIS — N52.9 ERECTILE DYSFUNCTION, UNSPECIFIED ERECTILE DYSFUNCTION TYPE: ICD-10-CM

## 2020-11-25 RX ORDER — TADALAFIL 20 MG/1
TABLET ORAL
Qty: 10 TAB | Refills: 5 | Status: SHIPPED | OUTPATIENT
Start: 2020-11-25 | End: 2021-11-23

## 2020-11-25 NOTE — PROCEDURE: BOTOX
Right Periorbital Skin Units: 0
Consent: Verbal and written informed consent were obtained to include the following risks: pain, swelling, bruising, eyelid or eyebrow droop, and lack of visible improvement of wrinkles in the areas treated.  The skin was cleansed with alcohol. Injections were administered with a 32g needle into the following areas:
Additional Area 3 Units: 20
Reconstitution Date (Optional): 11/24/20
Dilution (U/0.1 Cc): 1.1
Additional Area 3 Location: Frontalis
Lot #: [id number]
Detail Level: Detailed
Additional Area 2 Units: 30
Additional Area 5 Location: perioral
Additional Area 1 Location: Glabella
Expiration Date (Month Year): 1/23
Additional Area 6 Location: platysma
Additional Area 1 Units: 25
Price (Use Numbers Only, No Special Characters Or $): 900
Post-Care Instructions: Patient instructed to not lie down for 4 hours after injections and limit physical activity for 24 hours.
Additional Area 4 Location: Bunny lines
Additional Area 2 Location: Crows Feet

## 2020-12-20 PROCEDURE — 0001A PFIZER SARS-COV-2 VACCINE: CPT

## 2020-12-20 PROCEDURE — 91300 PFIZER SARS-COV-2 VACCINE: CPT

## 2020-12-21 ENCOUNTER — IMMUNIZATION (OUTPATIENT)
Dept: FAMILY PLANNING/WOMEN'S HEALTH CLINIC | Facility: IMMUNIZATION CENTER | Age: 65
End: 2020-12-21
Payer: COMMERCIAL

## 2020-12-21 DIAGNOSIS — Z23 ENCOUNTER FOR VACCINATION: Primary | ICD-10-CM

## 2020-12-30 DIAGNOSIS — Z23 NEED FOR VACCINATION: ICD-10-CM

## 2021-01-10 ENCOUNTER — IMMUNIZATION (OUTPATIENT)
Dept: FAMILY PLANNING/WOMEN'S HEALTH CLINIC | Facility: IMMUNIZATION CENTER | Age: 66
End: 2021-01-10
Attending: FAMILY MEDICINE
Payer: COMMERCIAL

## 2021-01-10 DIAGNOSIS — Z23 ENCOUNTER FOR VACCINATION: Primary | ICD-10-CM

## 2021-01-10 DIAGNOSIS — Z23 NEED FOR VACCINATION: ICD-10-CM

## 2021-01-10 PROCEDURE — 91300 PFIZER SARS-COV-2 VACCINE: CPT

## 2021-01-10 PROCEDURE — 0002A PFIZER SARS-COV-2 VACCINE: CPT

## 2021-01-14 ENCOUNTER — APPOINTMENT (RX ONLY)
Dept: URBAN - METROPOLITAN AREA CLINIC 35 | Facility: CLINIC | Age: 66
Setting detail: DERMATOLOGY
End: 2021-01-14

## 2021-01-14 DIAGNOSIS — Z41.9 ENCOUNTER FOR PROCEDURE FOR PURPOSES OTHER THAN REMEDYING HEALTH STATE, UNSPECIFIED: ICD-10-CM

## 2021-01-14 PROCEDURE — ? MICRONEEDLING

## 2021-01-14 PROCEDURE — ? ADDITIONAL NOTES

## 2021-01-14 ASSESSMENT — LOCATION ZONE DERM
LOCATION ZONE: LIP
LOCATION ZONE: NOSE
LOCATION ZONE: FACE

## 2021-01-14 ASSESSMENT — LOCATION SIMPLE DESCRIPTION DERM
LOCATION SIMPLE: RIGHT CHEEK
LOCATION SIMPLE: CHIN
LOCATION SIMPLE: NOSE
LOCATION SIMPLE: LEFT CHEEK
LOCATION SIMPLE: SUPERIOR FOREHEAD
LOCATION SIMPLE: UPPER LIP

## 2021-01-14 ASSESSMENT — LOCATION DETAILED DESCRIPTION DERM
LOCATION DETAILED: NASAL SUPRATIP
LOCATION DETAILED: PHILTRUM
LOCATION DETAILED: LEFT INFERIOR CENTRAL MALAR CHEEK
LOCATION DETAILED: RIGHT CENTRAL MALAR CHEEK
LOCATION DETAILED: LEFT CHIN
LOCATION DETAILED: SUPERIOR MID FOREHEAD

## 2021-01-14 NOTE — HPI: COSMETIC (MICRONEEDLING)
Have You Had Microneedling Treatments Before?: has had a previous microneedling treatment
When Was Your Last Treatment?: 10/19/20

## 2021-01-14 NOTE — PROCEDURE: MICRONEEDLING
Topical Anesthesia?: 23% lidocaine, 7% tetracaine
Length Of Topical Anesthesia Application (Optional): 60 minutes
Detail Level: Zone
Treatment Number (Optional): 4
Infusions (Optional): PRP
Depth In Mm (Location #1): 0.5
Price (Use Numbers Only, No Special Characters Or $): 1000
Location #1: forehead, nose
Post-Care Instructions: After the procedure, take precautions agains sun exposure. Do not apply sunscreen for 12 hours after the procedure. Do not apply make-up for 12 hours after the procedure. Avoid alcohol based toners for 10-14 days. After 2-3 days patients can return to their regular skin regimen.
Depth In Mm (Location #2): 1
Depth In Mm (Location #4): 0.1
Depth In Mm (Location #3): 1.5
Location #2: chin
Location #3: cheeks
Consent: Written consent obtained, risks reviewed including but not limited to pain, scarring, infection and incomplete improvement.  Patient understands the procedure is cosmetic in nature and will require out of pocket payment.

## 2021-01-22 ENCOUNTER — HOSPITAL ENCOUNTER (OUTPATIENT)
Facility: MEDICAL CENTER | Age: 66
End: 2021-01-22
Attending: INTERNAL MEDICINE
Payer: COMMERCIAL

## 2021-01-22 ENCOUNTER — NON-PROVIDER VISIT (OUTPATIENT)
Dept: INTERNAL MEDICINE | Facility: IMAGING CENTER | Age: 66
End: 2021-01-22
Payer: COMMERCIAL

## 2021-01-22 DIAGNOSIS — Z01.89 ENCOUNTER FOR ROUTINE LABORATORY TESTING: ICD-10-CM

## 2021-01-22 DIAGNOSIS — Z79.899 LONG TERM USE OF DRUG: ICD-10-CM

## 2021-01-22 DIAGNOSIS — E55.9 VITAMIN D DEFICIENCY: ICD-10-CM

## 2021-01-22 DIAGNOSIS — Z12.5 PROSTATE CANCER SCREENING: ICD-10-CM

## 2021-01-22 DIAGNOSIS — I10 ESSENTIAL HYPERTENSION: ICD-10-CM

## 2021-01-22 DIAGNOSIS — E78.2 MIXED HYPERLIPIDEMIA WITH APOLIPOPROTEIN E3 VARIANT: ICD-10-CM

## 2021-01-22 DIAGNOSIS — E03.9 HYPOTHYROIDISM (ACQUIRED): ICD-10-CM

## 2021-01-22 LAB
25(OH)D3 SERPL-MCNC: 37 NG/ML (ref 30–100)
ALBUMIN SERPL BCP-MCNC: 4.2 G/DL (ref 3.2–4.9)
ALBUMIN/GLOB SERPL: 1.6 G/DL
ALP SERPL-CCNC: 47 U/L (ref 30–99)
ALT SERPL-CCNC: 28 U/L (ref 2–50)
ANION GAP SERPL CALC-SCNC: 7 MMOL/L (ref 7–16)
APPEARANCE UR: CLEAR
AST SERPL-CCNC: 37 U/L (ref 12–45)
BASOPHILS # BLD AUTO: 1.8 % (ref 0–1.8)
BASOPHILS # BLD: 0.09 K/UL (ref 0–0.12)
BILIRUB SERPL-MCNC: 0.8 MG/DL (ref 0.1–1.5)
BILIRUB UR QL STRIP.AUTO: NEGATIVE
BUN SERPL-MCNC: 13 MG/DL (ref 8–22)
CALCIUM SERPL-MCNC: 9.2 MG/DL (ref 8.5–10.5)
CHLORIDE SERPL-SCNC: 106 MMOL/L (ref 96–112)
CK SERPL-CCNC: 293 U/L (ref 0–154)
CO2 SERPL-SCNC: 25 MMOL/L (ref 20–33)
COLOR UR: YELLOW
CREAT SERPL-MCNC: 0.79 MG/DL (ref 0.5–1.4)
EOSINOPHIL # BLD AUTO: 0.35 K/UL (ref 0–0.51)
EOSINOPHIL NFR BLD: 6.9 % (ref 0–6.9)
ERYTHROCYTE [DISTWIDTH] IN BLOOD BY AUTOMATED COUNT: 44 FL (ref 35.9–50)
GLOBULIN SER CALC-MCNC: 2.6 G/DL (ref 1.9–3.5)
GLUCOSE SERPL-MCNC: 80 MG/DL (ref 65–99)
GLUCOSE UR STRIP.AUTO-MCNC: NEGATIVE MG/DL
HCT VFR BLD AUTO: 49.2 % (ref 42–52)
HGB BLD-MCNC: 16.1 G/DL (ref 14–18)
IMM GRANULOCYTES # BLD AUTO: 0.01 K/UL (ref 0–0.11)
IMM GRANULOCYTES NFR BLD AUTO: 0.2 % (ref 0–0.9)
KETONES UR STRIP.AUTO-MCNC: NEGATIVE MG/DL
LEUKOCYTE ESTERASE UR QL STRIP.AUTO: NEGATIVE
LYMPHOCYTES # BLD AUTO: 2.01 K/UL (ref 1–4.8)
LYMPHOCYTES NFR BLD: 39.4 % (ref 22–41)
MCH RBC QN AUTO: 30.3 PG (ref 27–33)
MCHC RBC AUTO-ENTMCNC: 32.7 G/DL (ref 33.7–35.3)
MCV RBC AUTO: 92.5 FL (ref 81.4–97.8)
MICRO URNS: NORMAL
MONOCYTES # BLD AUTO: 0.56 K/UL (ref 0–0.85)
MONOCYTES NFR BLD AUTO: 11 % (ref 0–13.4)
NEUTROPHILS # BLD AUTO: 2.08 K/UL (ref 1.82–7.42)
NEUTROPHILS NFR BLD: 40.7 % (ref 44–72)
NITRITE UR QL STRIP.AUTO: NEGATIVE
NRBC # BLD AUTO: 0 K/UL
NRBC BLD-RTO: 0 /100 WBC
PH UR STRIP.AUTO: 6 [PH] (ref 5–8)
PLATELET # BLD AUTO: 255 K/UL (ref 164–446)
PMV BLD AUTO: 11.8 FL (ref 9–12.9)
POTASSIUM SERPL-SCNC: 4.3 MMOL/L (ref 3.6–5.5)
PROT SERPL-MCNC: 6.8 G/DL (ref 6–8.2)
PROT UR QL STRIP: NEGATIVE MG/DL
PSA SERPL-MCNC: 3.51 NG/ML (ref 0–4)
RBC # BLD AUTO: 5.32 M/UL (ref 4.7–6.1)
RBC UR QL AUTO: NEGATIVE
SODIUM SERPL-SCNC: 138 MMOL/L (ref 135–145)
SP GR UR STRIP.AUTO: 1.02
T3 SERPL-MCNC: 79.3 NG/DL (ref 60–181)
TSH SERPL DL<=0.005 MIU/L-ACNC: 2.9 UIU/ML (ref 0.38–5.33)
UROBILINOGEN UR STRIP.AUTO-MCNC: 0.2 MG/DL
WBC # BLD AUTO: 5.1 K/UL (ref 4.8–10.8)

## 2021-01-22 PROCEDURE — 80053 COMPREHEN METABOLIC PANEL: CPT

## 2021-01-22 PROCEDURE — 85025 COMPLETE CBC W/AUTO DIFF WBC: CPT

## 2021-01-22 PROCEDURE — 84443 ASSAY THYROID STIM HORMONE: CPT

## 2021-01-22 PROCEDURE — 81003 URINALYSIS AUTO W/O SCOPE: CPT

## 2021-01-22 PROCEDURE — 82550 ASSAY OF CK (CPK): CPT

## 2021-01-22 PROCEDURE — 80061 LIPID PANEL: CPT

## 2021-01-22 PROCEDURE — 84480 ASSAY TRIIODOTHYRONINE (T3): CPT

## 2021-01-22 PROCEDURE — 84153 ASSAY OF PSA TOTAL: CPT

## 2021-01-22 PROCEDURE — 83704 LIPOPROTEIN BLD QUAN PART: CPT

## 2021-01-22 PROCEDURE — 82306 VITAMIN D 25 HYDROXY: CPT

## 2021-01-27 LAB
CHOLEST SERPL-MCNC: 181 MG/DL
HDL PARTICAL NO Q4363: 36.9 UMOL/L
HDL SIZE Q4361: 9.2 NM
HDLC SERPL-MCNC: 66 MG/DL (ref 40–59)
HLD.LARGE SERPL-SCNC: 9 UMOL/L
L VLDL PART NO Q4357: <1.5 NMOL/L
LDL SERPL QN: 21 NM
LDL SERPL-SCNC: 1238 NMOL/L
LDL SMALL SERPL-SCNC: 292 NMOL/L
LDLC SERPL CALC-MCNC: 97 MG/DL
PATHOLOGY STUDY: ABNORMAL
TRIGL SERPL-MCNC: 88 MG/DL (ref 30–149)
VLDL SIZE Q4362: 41.8 NM

## 2021-02-05 ENCOUNTER — OFFICE VISIT (OUTPATIENT)
Dept: INTERNAL MEDICINE | Facility: IMAGING CENTER | Age: 66
End: 2021-02-05
Payer: COMMERCIAL

## 2021-02-05 VITALS
RESPIRATION RATE: 14 BRPM | BODY MASS INDEX: 26.37 KG/M2 | WEIGHT: 174 LBS | HEIGHT: 68 IN | SYSTOLIC BLOOD PRESSURE: 100 MMHG | HEART RATE: 58 BPM | DIASTOLIC BLOOD PRESSURE: 60 MMHG | OXYGEN SATURATION: 97 % | TEMPERATURE: 98.2 F

## 2021-02-05 DIAGNOSIS — E03.9 HYPOTHYROIDISM (ACQUIRED): ICD-10-CM

## 2021-02-05 DIAGNOSIS — Z86.39 HISTORY OF NON ANEMIC VITAMIN B12 DEFICIENCY: ICD-10-CM

## 2021-02-05 DIAGNOSIS — I10 ESSENTIAL HYPERTENSION: ICD-10-CM

## 2021-02-05 DIAGNOSIS — R74.8 ELEVATED CPK: ICD-10-CM

## 2021-02-05 DIAGNOSIS — E78.00 HYPERCHOLESTEROLEMIA: ICD-10-CM

## 2021-02-05 DIAGNOSIS — Z79.899 LONG TERM USE OF DRUG: ICD-10-CM

## 2021-02-05 DIAGNOSIS — N13.8 BPH WITH OBSTRUCTION/LOWER URINARY TRACT SYMPTOMS: ICD-10-CM

## 2021-02-05 DIAGNOSIS — Z23 NEED FOR INFLUENZA VACCINATION: ICD-10-CM

## 2021-02-05 DIAGNOSIS — N40.1 BPH WITH OBSTRUCTION/LOWER URINARY TRACT SYMPTOMS: ICD-10-CM

## 2021-02-05 PROCEDURE — 99214 OFFICE O/P EST MOD 30 MIN: CPT | Mod: 25 | Performed by: INTERNAL MEDICINE

## 2021-02-05 PROCEDURE — 90686 IIV4 VACC NO PRSV 0.5 ML IM: CPT | Performed by: INTERNAL MEDICINE

## 2021-02-05 PROCEDURE — 90471 IMMUNIZATION ADMIN: CPT | Performed by: INTERNAL MEDICINE

## 2021-02-05 ASSESSMENT — ENCOUNTER SYMPTOMS
DIZZINESS: 0
MYALGIAS: 0

## 2021-02-05 ASSESSMENT — PATIENT HEALTH QUESTIONNAIRE - PHQ9: CLINICAL INTERPRETATION OF PHQ2 SCORE: 0

## 2021-02-05 ASSESSMENT — FIBROSIS 4 INDEX: FIB4 SCORE: 1.73

## 2021-02-05 NOTE — PROGRESS NOTES
Established Patient Note   HPI:        Adal comes in today to follow up HTN, hypothyroid and hyperlipidemia; he has done recent lab work.    Past Medical History:   Diagnosis Date   • ASTHMA     mild intermittent   • Family history of hypertension    • History of stroke    • HTN (hypertension) 2011   • Hyperlipidemia    • Hypothyroid 7/1/2016   • Psoriasis    • Stroke (HCC) 1997    Vertebral artery disection       Current Outpatient Medications   Medication Sig Dispense Refill   • tadalafil (CIALIS) 20 MG tablet TAKE 1 TABLET BY MOUTH AS NEEDED FOR ERECTILE DYSFUNCTION 10 Tab 5   • SYNTHROID 100 MCG Tab Take 1 Tab by mouth every morning. ON A EMPTY STOMACH 90 Tab 3   • benazepril (LOTENSIN) 5 MG Tab Take 1 Tab by mouth every day. 90 Tab 3   • albuterol 108 (90 Base) MCG/ACT Aero Soln inhalation aerosol Inhale 2 Puffs by mouth every 6 hours as needed. 8.5 g 5   • fluticasone (FLOVENT HFA) 110 MCG/ACT Aerosol Inhale 1 Puff by mouth 2 Times a Day. 1 Inhaler 3   • calcipotriene (DOVONEX) 0.005 % Cream Apply 2 g to affected area(s) 2 times a day. 120 g 6   • B Complex Cap Take 1 Cap by mouth every day. 100 Cap 3   • rosuvastatin (CRESTOR) 10 MG Tab TAKE 1 TABLET BY MOUTH EVERY DAY (Patient taking differently: Take 5 mg by mouth every day.) 90 Tab 3   • Cholecalciferol (VITAMIN D) 2000 UNITS Cap Take 1 Cap by mouth every day.     • aspirin EC (ECOTRIN) 81 MG Tablet Delayed Response Take 81 mg by mouth every day.       No current facility-administered medications for this visit.          No Known Allergies      Social History     Tobacco Use   • Smoking status: Light Tobacco Smoker     Types: Cigars   • Smokeless tobacco: Never Used   • Tobacco comment: 1 cigar per week   Substance Use Topics   • Alcohol use: Yes     Alcohol/week: 8.4 oz     Types: 14 Standard drinks or equivalent per week   • Drug use: No       Past Surgical History:   Procedure Laterality Date   • OTHER  age 12     nasal polyps        Review of  "Systems   Genitourinary:        No straining to urinate. He does have nocturia once to twice a night; symptoms have been present for a few years.   Musculoskeletal: Negative for myalgias.   Neurological: Negative for dizziness.       Ambulatory Vitals  /60 (BP Location: Left arm, Patient Position: Sitting, BP Cuff Size: Adult)   Pulse (!) 58   Temp 36.8 °C (98.2 °F) (Temporal)   Resp 14   Ht 1.727 m (5' 8\")   Wt 78.9 kg (174 lb)   SpO2 97%   BMI 26.46 kg/m²     Physical Exam   Constitutional: He is well-developed, well-nourished, and in no distress. No distress.   Cardiovascular: Normal rate, regular rhythm and normal heart sounds.   Pulmonary/Chest: Effort normal and breath sounds normal. He has no wheezes. He has no rales.   Genitourinary:    Genitourinary Comments: Prostate mildly enlarged.     Musculoskeletal:         General: No edema.   Neurological: He is alert. No cranial nerve deficit. Gait normal. Coordination normal.   Skin: He is not diaphoretic.       Component      Latest Ref Rng & Units 1/14/2020 8/11/2020 1/22/2021   WBC      4.8 - 10.8 K/uL 5.2  5.1   RBC      4.70 - 6.10 M/uL 5.06  5.32   Hemoglobin      14.0 - 18.0 g/dL 15.5  16.1   Hematocrit      42.0 - 52.0 % 46.9  49.2   MCV      81.4 - 97.8 fL 92.7  92.5   MCH      27.0 - 33.0 pg 30.6  30.3   MCHC      33.7 - 35.3 g/dL 33.0 (L)  32.7 (L)   RDW      35.9 - 50.0 fL 44.2  44.0   Platelet Count      164 - 446 K/uL 221  255   MPV      9.0 - 12.9 fL 12.1  11.8   Neutrophils-Polys      44.00 - 72.00 % 41.40 (L)  40.70 (L)   Lymphocytes      22.00 - 41.00 % 38.10  39.40   Monocytes      0.00 - 13.40 % 12.40  11.00   Eosinophils      0.00 - 6.90 % 6.20  6.90   Basophils      0.00 - 1.80 % 1.70  1.80   Immature Granulocytes      0.00 - 0.90 % 0.20  0.20   Nucleated RBC      /100 WBC 0.00  0.00   Neutrophils (Absolute)      1.82 - 7.42 K/uL 2.14  2.08   Lymphs (Absolute)      1.00 - 4.80 K/uL 1.97  2.01   Monos (Absolute)      0.00 - 0.85 " K/uL 0.64  0.56   Eos (Absolute)      0.00 - 0.51 K/uL 0.32  0.35   Baso (Absolute)      0.00 - 0.12 K/uL 0.09  0.09   Immature Granulocytes (abs)      0.00 - 0.11 K/uL 0.01  0.01   NRBC (Absolute)      K/uL 0.00  0.00   Sodium      135 - 145 mmol/L 141 140 138   Potassium      3.6 - 5.5 mmol/L 4.4 4.5 4.3   Chloride      96 - 112 mmol/L 108 105 106   Co2      20 - 33 mmol/L 28 24 25   Anion Gap      7.0 - 16.0 5.0 11.0 7.0   Glucose      65 - 99 mg/dL 88 83 80   Bun      8 - 22 mg/dL 13 15 13   Creatinine      0.50 - 1.40 mg/dL 0.90 0.83 0.79   Calcium      8.5 - 10.5 mg/dL 9.3 9.5 9.2   AST(SGOT)      12 - 45 U/L 23 33 37   ALT(SGPT)      2 - 50 U/L 18 21 28   Alkaline Phosphatase      30 - 99 U/L 43 39 47   Total Bilirubin      0.1 - 1.5 mg/dL 1.0 0.3 0.8   Albumin      3.2 - 4.9 g/dL 4.1 4.1 4.2   Total Protein      6.0 - 8.2 g/dL 6.5 6.8 6.8   Globulin      1.9 - 3.5 g/dL 2.4 2.7 2.6   A-G Ratio      g/dL 1.7 1.5 1.6   Color       Yellow  Yellow   Character       Clear  Clear   Specific Gravity      <1.035 1.012  1.019   Ph      5.0 - 8.0 6.5  6.0   Glucose      Negative mg/dL Negative  Negative   Ketones      Negative mg/dL Negative  Negative   Protein      Negative mg/dL Negative  Negative   Bilirubin      Negative Negative  Negative   Urobilinogen, Urine      Negative 0.2  0.2   Nitrite      Negative Negative  Negative   Leukocyte Esterase      Negative Negative  Negative   Occult Blood      Negative Negative  Negative   Micro Urine Req       see below  see below   Cholesterol,Tot      <=199 mg/dL 180 171 181   Triglycerides      30 - 149 mg/dL 140 231 (H) 88   HDL      40 - 59 mg/dL 60 (H) 53 66 (H)   LDL Cholesterol      <=129 mg/dL 92 72 97   LDL Particle      <=1135 nmol/L 1251 (H) 1018 1238 (H)   Small LDL      <=634 nmol/L <165 392 292   L-VLDL Particle No.      <=2.7 nmol/L Not Quant Not Quant <1.5   HDL Particle No.      >=33.0 umol/L 37.0 35.2 36.9   L-HDL Particle No.      >=4.2 umol/L 7.5 5.4 9.0    LDL Particle Size      >=20.7 nm 21.1 20.7 21.0   VLDL Size      <=46.7 nm 35.0 43.9 41.8   HDL Size      >=8.9 nm 8.9 8.8 (L) 9.2   EER LipoFit by NMR       See Note See Note See Note   Testosterone,Total      300 - 720 ng/dL  541    Sex Hormone Bind Globulin      11 - 80 nmol/L  54    Free Testosterone      47 - 244 pg/mL  77    Testosterone % Free      1.6 - 2.9 %  1.4 (L)    GFR If African American      >60 mL/min/1.73 m 2  >60 >60   GFR If Non African American      >60 mL/min/1.73 m 2  >60 >60   Vitamin B12 -True Cobalamin      211 - 911 pg/mL 305 1487 (H)    25-Hydroxy   Vitamin D 25      30 - 100 ng/mL 36  37   Prostatic Specific Antigen Tot      0.00 - 4.00 ng/mL 3.03  3.51   CPK Total      0 - 154 U/L 169 (H) 408 (H) 293 (H)   TSH      0.380 - 5.330 uIU/mL 2.470 3.560 2.900   T3      60.0 - 181.0 ng/dL 86.5 70.2 79.3     Assessment and Plan:     1. Essential hypertension  Comp Metabolic Panel   2. Hypothyroidism (acquired)     3. Hypercholesterolemia  Lipid Profile   4. History of non anemic vitamin B12 deficiency  VITAMIN B12   5. Elevated CPK  CREATINE KINASE   6. BPH with obstruction/lower urinary tract symptoms     7. Long term use of drug  Comp Metabolic Panel     Recommend he start taking coenzyme Q10 200-300 mg qd since he is taking daily statin. Check CMP, CPK, B12 and lipid panel in 6 months. Discussed since BPH symptoms are mild at this time would not recommend Rx.  Face to face time: 30 minutes with greater than 50% of time spent with direct patient contact and medical management.     Toby Minor M.D.

## 2021-02-25 ENCOUNTER — APPOINTMENT (RX ONLY)
Dept: URBAN - METROPOLITAN AREA CLINIC 35 | Facility: CLINIC | Age: 66
Setting detail: DERMATOLOGY
End: 2021-02-25

## 2021-02-25 DIAGNOSIS — Z41.9 ENCOUNTER FOR PROCEDURE FOR PURPOSES OTHER THAN REMEDYING HEALTH STATE, UNSPECIFIED: ICD-10-CM

## 2021-02-25 PROCEDURE — ? ADDITIONAL NOTES

## 2021-02-25 PROCEDURE — ? BOTOX

## 2021-02-25 NOTE — PROCEDURE: BOTOX
Right Periorbital Skin Units: 0
Consent: Verbal and written informed consent were obtained to include the following risks: pain, swelling, bruising, eyelid or eyebrow droop, and lack of visible improvement of wrinkles in the areas treated.  The skin was cleansed with alcohol. Injections were administered with a 32g needle into the following areas:
Additional Area 3 Units: 20
Reconstitution Date (Optional): 2/25/21
Dilution (U/0.1 Cc): 1.1
Additional Area 3 Location: Frontalis
Lot #: [id number]
Detail Level: Detailed
Additional Area 2 Units: 55
Additional Area 5 Location: perioral
Additional Area 1 Location: Glabella
Expiration Date (Month Year): 8/23
Additional Area 6 Location: platysma
Additional Area 1 Units: 25
Price (Use Numbers Only, No Special Characters Or $): 1200
Post-Care Instructions: Patient instructed to not lie down for 4 hours after injections and limit physical activity for 24 hours.
Additional Area 4 Location: Bunny lines
Additional Area 2 Location: Crows Feet

## 2021-04-15 ENCOUNTER — APPOINTMENT (RX ONLY)
Dept: URBAN - METROPOLITAN AREA CLINIC 35 | Facility: CLINIC | Age: 66
Setting detail: DERMATOLOGY
End: 2021-04-15

## 2021-04-15 DIAGNOSIS — Z41.9 ENCOUNTER FOR PROCEDURE FOR PURPOSES OTHER THAN REMEDYING HEALTH STATE, UNSPECIFIED: ICD-10-CM

## 2021-04-15 PROCEDURE — ? MICRONEEDLING

## 2021-04-15 ASSESSMENT — LOCATION ZONE DERM
LOCATION ZONE: FACE
LOCATION ZONE: LIP
LOCATION ZONE: NOSE

## 2021-04-15 ASSESSMENT — LOCATION SIMPLE DESCRIPTION DERM
LOCATION SIMPLE: CHIN
LOCATION SIMPLE: SUPERIOR FOREHEAD
LOCATION SIMPLE: UPPER LIP
LOCATION SIMPLE: RIGHT CHEEK
LOCATION SIMPLE: NOSE
LOCATION SIMPLE: LEFT CHEEK

## 2021-04-15 ASSESSMENT — LOCATION DETAILED DESCRIPTION DERM
LOCATION DETAILED: SUPERIOR MID FOREHEAD
LOCATION DETAILED: LEFT INFERIOR CENTRAL MALAR CHEEK
LOCATION DETAILED: RIGHT CENTRAL MALAR CHEEK
LOCATION DETAILED: PHILTRUM
LOCATION DETAILED: LEFT CHIN
LOCATION DETAILED: NASAL SUPRATIP

## 2021-04-15 NOTE — PROCEDURE: MICRONEEDLING
Topical Anesthesia?: 23% lidocaine, 7% tetracaine
Length Of Topical Anesthesia Application (Optional): 60 minutes
Detail Level: Zone
Treatment Number (Optional): 5
Infusions (Optional): PRP
Depth In Mm (Location #1): 0.5
Price (Use Numbers Only, No Special Characters Or $): 900
Location #1: forehead, nose
Post-Care Instructions: After the procedure, take precautions agains sun exposure. Do not apply sunscreen for 12 hours after the procedure. Do not apply make-up for 12 hours after the procedure. Avoid alcohol based toners for 10-14 days. After 2-3 days patients can return to their regular skin regimen.
Depth In Mm (Location #2): 1
Depth In Mm (Location #4): 0.1
Depth In Mm (Location #3): 1.5
Location #2: chin
Location #3: cheeks
Consent: Written consent obtained, risks reviewed including but not limited to pain, scarring, infection and incomplete improvement.  Patient understands the procedure is cosmetic in nature and will require out of pocket payment.

## 2021-04-23 DIAGNOSIS — E78.2 MIXED HYPERLIPIDEMIA WITH APOLIPOPROTEIN E3 VARIANT: ICD-10-CM

## 2021-04-23 RX ORDER — ROSUVASTATIN CALCIUM 10 MG/1
TABLET, COATED ORAL
Qty: 90 TABLET | Refills: 3 | Status: SHIPPED | OUTPATIENT
Start: 2021-04-23 | End: 2022-04-08

## 2021-05-10 DIAGNOSIS — L40.9 PSORIASIS: ICD-10-CM

## 2021-05-10 RX ORDER — CALCIPOTRIENE 50 UG/G
CREAM TOPICAL
Qty: 120 G | Refills: 6 | Status: SHIPPED | OUTPATIENT
Start: 2021-05-10 | End: 2022-03-18 | Stop reason: SDUPTHER

## 2021-05-27 ENCOUNTER — APPOINTMENT (RX ONLY)
Dept: URBAN - METROPOLITAN AREA CLINIC 35 | Facility: CLINIC | Age: 66
Setting detail: DERMATOLOGY
End: 2021-05-27

## 2021-05-27 DIAGNOSIS — Z41.9 ENCOUNTER FOR PROCEDURE FOR PURPOSES OTHER THAN REMEDYING HEALTH STATE, UNSPECIFIED: ICD-10-CM

## 2021-05-27 PROCEDURE — ? BOTOX

## 2021-05-27 PROCEDURE — ? ADDITIONAL NOTES

## 2021-05-27 NOTE — PROCEDURE: BOTOX
Right Periorbital Skin Units: 0
Consent: Verbal and written informed consent were obtained to include the following risks: pain, swelling, bruising, eyelid or eyebrow droop, and lack of visible improvement of wrinkles in the areas treated.  The skin was cleansed with alcohol. Injections were administered with a 32g needle into the following areas:
Additional Area 3 Units: 25
Reconstitution Date (Optional): 5/27/21
Dilution (U/0.1 Cc): 1.1
Additional Area 3 Location: Frontalis
Lot #: [id number]
Detail Level: Detailed
Additional Area 2 Units: 73
Additional Area 5 Location: perioral
Additional Area 1 Location: Glabella
Expiration Date (Month Year): 8/23
Additional Area 6 Location: platysma
Additional Area 1 Units: 27
Price (Use Numbers Only, No Special Characters Or $): 1500
Post-Care Instructions: Patient instructed to not lie down for 4 hours after injections and limit physical activity for 24 hours.
Additional Area 4 Location: Bunny lines
Additional Area 2 Location: Crows Feet

## 2021-07-13 ENCOUNTER — APPOINTMENT (RX ONLY)
Dept: URBAN - METROPOLITAN AREA CLINIC 35 | Facility: CLINIC | Age: 66
Setting detail: DERMATOLOGY
End: 2021-07-13

## 2021-07-13 DIAGNOSIS — Z41.9 ENCOUNTER FOR PROCEDURE FOR PURPOSES OTHER THAN REMEDYING HEALTH STATE, UNSPECIFIED: ICD-10-CM

## 2021-07-13 PROCEDURE — ? SKINPEN

## 2021-07-13 NOTE — PROCEDURE: SKINPEN
Location #1: cheeks
Depth In Mm: 1
Location #2: chin and neck
Depth In Mm: 0.25
Post-Care Instructions: After the procedure, take precautions agains sun exposure. Do not apply sunscreen for 12 hours after the procedure. Do not apply make-up for 12 hours after the procedure. Avoid alcohol based toners for 10-14 days. After 2-3 days patients can return to normal skin care regimen. Pt will return in one month for additional treatment. Anteage used on skin during treatment.
Treatment Number (Optional): 0
Location #4: forehead
Consent: Written consent obtained, risks reviewed including but not limited to pain, scarring, infection and incomplete improvement.
Location #3: nose
Depth In Mm: 0.5
Detail Level: Zone
Depth In Mm: 1.5
Price (Use Numbers Only, No Special Characters Or $): 500

## 2021-08-05 ENCOUNTER — NON-PROVIDER VISIT (OUTPATIENT)
Dept: INTERNAL MEDICINE | Facility: IMAGING CENTER | Age: 66
End: 2021-08-05
Payer: COMMERCIAL

## 2021-08-05 ENCOUNTER — HOSPITAL ENCOUNTER (OUTPATIENT)
Facility: MEDICAL CENTER | Age: 66
End: 2021-08-05
Attending: INTERNAL MEDICINE
Payer: COMMERCIAL

## 2021-08-05 DIAGNOSIS — Z86.39 HISTORY OF NON ANEMIC VITAMIN B12 DEFICIENCY: ICD-10-CM

## 2021-08-05 DIAGNOSIS — R74.8 ELEVATED CPK: ICD-10-CM

## 2021-08-05 DIAGNOSIS — I10 ESSENTIAL HYPERTENSION: ICD-10-CM

## 2021-08-05 DIAGNOSIS — Z79.899 LONG TERM USE OF DRUG: ICD-10-CM

## 2021-08-05 DIAGNOSIS — Z01.89 ENCOUNTER FOR ROUTINE LABORATORY TESTING: ICD-10-CM

## 2021-08-05 DIAGNOSIS — E78.00 HYPERCHOLESTEROLEMIA: ICD-10-CM

## 2021-08-05 LAB
ALBUMIN SERPL BCP-MCNC: 4.2 G/DL (ref 3.2–4.9)
ALBUMIN/GLOB SERPL: 1.4 G/DL
ALP SERPL-CCNC: 42 U/L (ref 30–99)
ALT SERPL-CCNC: 27 U/L (ref 2–50)
ANION GAP SERPL CALC-SCNC: 9 MMOL/L (ref 7–16)
AST SERPL-CCNC: 32 U/L (ref 12–45)
BILIRUB SERPL-MCNC: 0.8 MG/DL (ref 0.1–1.5)
BUN SERPL-MCNC: 17 MG/DL (ref 8–22)
CALCIUM SERPL-MCNC: 9.3 MG/DL (ref 8.5–10.5)
CHLORIDE SERPL-SCNC: 103 MMOL/L (ref 96–112)
CHOLEST SERPL-MCNC: 203 MG/DL (ref 100–199)
CK SERPL-CCNC: 246 U/L (ref 0–154)
CO2 SERPL-SCNC: 25 MMOL/L (ref 20–33)
CREAT SERPL-MCNC: 0.84 MG/DL (ref 0.5–1.4)
GLOBULIN SER CALC-MCNC: 2.9 G/DL (ref 1.9–3.5)
GLUCOSE SERPL-MCNC: 83 MG/DL (ref 65–99)
HDLC SERPL-MCNC: 78 MG/DL
LDLC SERPL CALC-MCNC: 106 MG/DL
POTASSIUM SERPL-SCNC: 4.8 MMOL/L (ref 3.6–5.5)
PROT SERPL-MCNC: 7.1 G/DL (ref 6–8.2)
SODIUM SERPL-SCNC: 137 MMOL/L (ref 135–145)
TRIGL SERPL-MCNC: 96 MG/DL (ref 0–149)
VIT B12 SERPL-MCNC: 1060 PG/ML (ref 211–911)

## 2021-08-05 PROCEDURE — 80061 LIPID PANEL: CPT

## 2021-08-05 PROCEDURE — 82550 ASSAY OF CK (CPK): CPT

## 2021-08-05 PROCEDURE — 82607 VITAMIN B-12: CPT

## 2021-08-05 PROCEDURE — 80053 COMPREHEN METABOLIC PANEL: CPT

## 2021-08-06 DIAGNOSIS — I10 ESSENTIAL HYPERTENSION: ICD-10-CM

## 2021-08-06 RX ORDER — BENAZEPRIL HYDROCHLORIDE 5 MG/1
TABLET ORAL
Qty: 90 TABLET | Refills: 3 | Status: SHIPPED | OUTPATIENT
Start: 2021-08-06 | End: 2022-05-03

## 2021-08-26 ENCOUNTER — APPOINTMENT (RX ONLY)
Dept: URBAN - METROPOLITAN AREA CLINIC 35 | Facility: CLINIC | Age: 66
Setting detail: DERMATOLOGY
End: 2021-08-26

## 2021-08-26 DIAGNOSIS — Z41.9 ENCOUNTER FOR PROCEDURE FOR PURPOSES OTHER THAN REMEDYING HEALTH STATE, UNSPECIFIED: ICD-10-CM

## 2021-08-26 PROCEDURE — ? ADDITIONAL NOTES

## 2021-08-26 PROCEDURE — ? FILLERS

## 2021-08-26 PROCEDURE — ? BOTOX

## 2021-08-26 NOTE — PROCEDURE: BOTOX
Right Periorbital Skin Units: 0
Consent: Verbal and written informed consent were obtained to include the following risks: pain, swelling, bruising, eyelid or eyebrow droop, and lack of visible improvement of wrinkles in the areas treated.  The skin was cleansed with alcohol. Injections were administered with a 32g needle into the following areas:
Additional Area 3 Units: 25
Reconstitution Date (Optional): 5/27/21
Dilution (U/0.1 Cc): 1.1
Additional Area 3 Location: Frontalis
Lot #: [id number]
Detail Level: Detailed
Additional Area 2 Units: 73
Additional Area 5 Location: perioral
Additional Area 1 Location: Glabella
Expiration Date (Month Year): 8/23
Additional Area 6 Location: platysma
Additional Area 1 Units: 27
Price (Use Numbers Only, No Special Characters Or $): 1500
Post-Care Instructions: Patient instructed to not lie down for 4 hours after injections and limit physical activity for 24 hours.
Additional Area 4 Location: Bunny lines
Additional Area 2 Location: Crows Feet

## 2021-08-26 NOTE — PROCEDURE: FILLERS
[place] Filler Volume In Cc: 0
Additional Area 2 Location: Border of lips
Additional Area 4 Location: Scars
Additional Area 1 Location: Oral Commisures
Additional Area 3 Location: Fine lines around mouth
Additional Area 5 Location: Earlobes
Post-Care Instructions: Patient instructed to apply ice to reduce swelling.
Include Cannula Information In Note?: No
Map Statment: See Attach Map for Complete Details
Consent: Written consent obtained. Risks include but not limited to bruising, bleeding, blindness, stroke, delayed onset of nodules, irregular texture, ulceration, infection, allergic reaction, scar formation, incomplete augmentation, temporary nature, procedural pain.
Use Map Statement For Sites (Optional): Yes
Filler: Juvederm Voluma XC
Detail Level: Detailed

## 2021-08-30 ENCOUNTER — OFFICE VISIT (OUTPATIENT)
Dept: INTERNAL MEDICINE | Facility: IMAGING CENTER | Age: 66
End: 2021-08-30
Payer: COMMERCIAL

## 2021-08-30 VITALS
SYSTOLIC BLOOD PRESSURE: 102 MMHG | BODY MASS INDEX: 26.67 KG/M2 | HEART RATE: 67 BPM | DIASTOLIC BLOOD PRESSURE: 60 MMHG | OXYGEN SATURATION: 94 % | TEMPERATURE: 98.3 F | HEIGHT: 68 IN | WEIGHT: 176 LBS | RESPIRATION RATE: 14 BRPM

## 2021-08-30 DIAGNOSIS — Z12.5 PROSTATE CANCER SCREENING: ICD-10-CM

## 2021-08-30 DIAGNOSIS — R74.8 ELEVATED CPK: ICD-10-CM

## 2021-08-30 DIAGNOSIS — Z00.00 WELLNESS EXAMINATION: ICD-10-CM

## 2021-08-30 DIAGNOSIS — I10 ESSENTIAL HYPERTENSION: ICD-10-CM

## 2021-08-30 DIAGNOSIS — E03.9 HYPOTHYROIDISM (ACQUIRED): ICD-10-CM

## 2021-08-30 DIAGNOSIS — Z79.899 LONG TERM USE OF DRUG: ICD-10-CM

## 2021-08-30 DIAGNOSIS — N40.1 BPH WITH OBSTRUCTION/LOWER URINARY TRACT SYMPTOMS: ICD-10-CM

## 2021-08-30 DIAGNOSIS — Z86.39 HISTORY OF VITAMIN D DEFICIENCY: ICD-10-CM

## 2021-08-30 DIAGNOSIS — N13.8 BPH WITH OBSTRUCTION/LOWER URINARY TRACT SYMPTOMS: ICD-10-CM

## 2021-08-30 DIAGNOSIS — E78.2 MIXED HYPERLIPIDEMIA WITH APOLIPOPROTEIN E3 VARIANT: ICD-10-CM

## 2021-08-30 PROCEDURE — 99213 OFFICE O/P EST LOW 20 MIN: CPT | Performed by: INTERNAL MEDICINE

## 2021-08-30 ASSESSMENT — FIBROSIS 4 INDEX: FIB4 SCORE: 1.55

## 2021-08-30 ASSESSMENT — ENCOUNTER SYMPTOMS: MYALGIAS: 0

## 2021-08-30 NOTE — PROGRESS NOTES
Established Patient Note   HPI:        Adal returns today to follow up HTN, hyperlipidemia and hypothyroid; he has done recent lab work. He does not monitor BP at home.    Past Medical History:   Diagnosis Date   • ASTHMA     mild intermittent   • Family history of hypertension    • History of stroke    • HTN (hypertension) 2011   • Hyperlipidemia    • Hypothyroid 7/1/2016   • Psoriasis    • Stroke (HCC) 1997    Vertebral artery disection       Current Outpatient Medications   Medication Sig Dispense Refill   • benazepril (LOTENSIN) 5 MG Tab TAKE 1 TABLET BY MOUTH EVERY DAY 90 tablet 3   • calcipotriene (DOVONEX) 0.005 % Cream APPLY 2 GRAMS EXTERNALLY TO THE AFFECTED AREA TWICE DAILY 120 g 6   • rosuvastatin (CRESTOR) 10 MG Tab TAKE 1 TABLET BY MOUTH DAILY (Patient taking differently: Take 5 mg by mouth every day.) 90 tablet 3   • tadalafil (CIALIS) 20 MG tablet TAKE 1 TABLET BY MOUTH AS NEEDED FOR ERECTILE DYSFUNCTION 10 Tab 5   • SYNTHROID 100 MCG Tab Take 1 Tab by mouth every morning. ON A EMPTY STOMACH 90 Tab 3   • albuterol 108 (90 Base) MCG/ACT Aero Soln inhalation aerosol Inhale 2 Puffs by mouth every 6 hours as needed. 8.5 g 5   • fluticasone (FLOVENT HFA) 110 MCG/ACT Aerosol Inhale 1 Puff by mouth 2 Times a Day. 1 Inhaler 3   • B Complex Cap Take 1 Cap by mouth every day. 100 Cap 3   • Cholecalciferol (VITAMIN D) 2000 UNITS Cap Take 1 Cap by mouth every day.     • aspirin EC (ECOTRIN) 81 MG Tablet Delayed Response Take 81 mg by mouth every day.       No current facility-administered medications for this visit.         No Known Allergies      Social History     Tobacco Use   • Smoking status: Light Tobacco Smoker     Types: Cigars   • Smokeless tobacco: Never Used   • Tobacco comment: 1 cigar per week   Substance Use Topics   • Alcohol use: Yes     Alcohol/week: 8.4 oz     Types: 14 Standard drinks or equivalent per week   • Drug use: No       Past Surgical History:   Procedure Laterality Date   • OTHER   "age 12     nasal polyps        Review of Systems   Constitutional: Negative for malaise/fatigue.   Musculoskeletal: Negative for myalgias.        No muscle weakness.       Ambulatory Vitals  /60 (BP Location: Left arm, Patient Position: Sitting, BP Cuff Size: Adult)   Pulse 67   Temp 36.8 °C (98.3 °F) (Temporal)   Resp 14   Ht 1.727 m (5' 8\")   Wt 79.8 kg (176 lb)   SpO2 94%   BMI 26.76 kg/m²     Physical Exam  Constitutional:       Appearance: Normal appearance.   Cardiovascular:      Rate and Rhythm: Normal rate and regular rhythm.      Heart sounds: Normal heart sounds. No murmur heard.   No friction rub. No gallop.    Pulmonary:      Effort: Pulmonary effort is normal.      Breath sounds: Normal breath sounds. No wheezing or rales.   Musculoskeletal:      Right lower leg: No edema.      Left lower leg: No edema.         Component      Latest Ref Rng & Units 8/11/2020 1/22/2021 8/5/2021   WBC      4.8 - 10.8 K/uL  5.1    RBC      4.70 - 6.10 M/uL  5.32    Hemoglobin      14.0 - 18.0 g/dL  16.1    Hematocrit      42.0 - 52.0 %  49.2    MCV      81.4 - 97.8 fL  92.5    MCH      27.0 - 33.0 pg  30.3    MCHC      33.7 - 35.3 g/dL  32.7 (L)    RDW      35.9 - 50.0 fL  44.0    Platelet Count      164 - 446 K/uL  255    MPV      9.0 - 12.9 fL  11.8    Neutrophils-Polys      44.00 - 72.00 %  40.70 (L)    Lymphocytes      22.00 - 41.00 %  39.40    Monocytes      0.00 - 13.40 %  11.00    Eosinophils      0.00 - 6.90 %  6.90    Basophils      0.00 - 1.80 %  1.80    Immature Granulocytes      0.00 - 0.90 %  0.20    Nucleated RBC      /100 WBC  0.00    Neutrophils (Absolute)      1.82 - 7.42 K/uL  2.08    Lymphs (Absolute)      1.00 - 4.80 K/uL  2.01    Monos (Absolute)      0.00 - 0.85 K/uL  0.56    Eos (Absolute)      0.00 - 0.51 K/uL  0.35    Baso (Absolute)      0.00 - 0.12 K/uL  0.09    Immature Granulocytes (abs)      0.00 - 0.11 K/uL  0.01    NRBC (Absolute)      K/uL  0.00    Sodium      135 - 145 mmol/L " 140 138 137   Potassium      3.6 - 5.5 mmol/L 4.5 4.3 4.8   Chloride      96 - 112 mmol/L 105 106 103   Co2      20 - 33 mmol/L 24 25 25   Anion Gap      7.0 - 16.0 11.0 7.0 9.0   Glucose      65 - 99 mg/dL 83 80 83   Bun      8 - 22 mg/dL 15 13 17   Creatinine      0.50 - 1.40 mg/dL 0.83 0.79 0.84   Calcium      8.5 - 10.5 mg/dL 9.5 9.2 9.3   AST(SGOT)      12 - 45 U/L 33 37 32   ALT(SGPT)      2 - 50 U/L 21 28 27   Alkaline Phosphatase      30 - 99 U/L 39 47 42   Total Bilirubin      0.1 - 1.5 mg/dL 0.3 0.8 0.8   Albumin      3.2 - 4.9 g/dL 4.1 4.2 4.2   Total Protein      6.0 - 8.2 g/dL 6.8 6.8 7.1   Globulin      1.9 - 3.5 g/dL 2.7 2.6 2.9   A-G Ratio      g/dL 1.5 1.6 1.4   Cholesterol,Tot      100 - 199 mg/dL 171 181 203 (H)   Triglycerides      0 - 149 mg/dL 231 (H) 88 96   HDL      >=40 mg/dL 53 66 (H) 78   LDL Cholesterol      <=129 mg/dL 72 97    LDL Particle      <=1135 nmol/L 1018 1238 (H)    Small LDL      <=634 nmol/L 392 292    L-VLDL Particle No.      <=2.7 nmol/L Not Quant <1.5    HDL Particle No.      >=33.0 umol/L 35.2 36.9    L-HDL Particle No.      >=4.2 umol/L 5.4 9.0    LDL Particle Size      >=20.7 nm 20.7 21.0    VLDL Size      <=46.7 nm 43.9 41.8    HDL Size      >=8.9 nm 8.8 (L) 9.2    EER LipoFit by NMR       See Note See Note    Color        Yellow    Character        Clear    Specific Gravity      <1.035  1.019    Ph      5.0 - 8.0  6.0    Glucose      Negative mg/dL  Negative    Ketones      Negative mg/dL  Negative    Protein      Negative mg/dL  Negative    Bilirubin      Negative  Negative    Urobilinogen, Urine      Negative  0.2    Nitrite      Negative  Negative    Leukocyte Esterase      Negative  Negative    Occult Blood      Negative  Negative    Micro Urine Req        see below    Testosterone,Total      300 - 720 ng/dL 541     Sex Hormone Bind Globulin      11 - 80 nmol/L 54     Free Testosterone      47 - 244 pg/mL 77     Testosterone % Free      1.6 - 2.9 % 1.4 (L)      LDL      <100 mg/dL   106 (H)   GFR If African American      >60 mL/min/1.73 m 2 >60 >60 >60   GFR If Non African American      >60 mL/min/1.73 m 2 >60 >60 >60   Vitamin B12 -True Cobalamin      211 - 911 pg/mL 1487 (H)  1060 (H)   CPK Total      0 - 154 U/L 408 (H) 293 (H) 246 (H)   TSH      0.380 - 5.330 uIU/mL 3.560 2.900    T3      60.0 - 181.0 ng/dL 70.2 79.3    Prostatic Specific Antigen Tot      0.00 - 4.00 ng/mL  3.51    25-Hydroxy   Vitamin D 25      30 - 100 ng/mL  37      Assessment and Plan:     1. Essential hypertension  Comp Metabolic Panel    controlled with benzapril 5 mg qd   2. Mixed hyperlipidemia with apolipoprotein E3 variant  LipoFit by NMR   3. Hypothyroidism (acquired)  TSH    TRIIDOTHYRONINE   4. Elevated CPK  CREATINE KINASE   5. History of vitamin D deficiency  VITAMIN D,25 HYDROXY   6. BPH with obstruction/lower urinary tract symptoms, nocturia  Comp Metabolic Panel    URINALYSIS   7. Prostate cancer screening  PROSTATE SPECIFIC AG SCREENING   8. Wellness examination  CBC WITH DIFFERENTIAL   9. Long term use of drug  Comp Metabolic Panel     Continue current medications at current dosages. Check CBC, CMP, lipoprofile NMR, TSH/T3, vitamin D, PSA, CPK, urinalysis in 6 months.    Toby Minor M.D.

## 2021-09-04 DIAGNOSIS — E03.9 HYPOTHYROIDISM (ACQUIRED): ICD-10-CM

## 2021-09-07 RX ORDER — LEVOTHYROXINE SODIUM 100 MCG
TABLET ORAL
Qty: 90 TABLET | Refills: 3 | Status: SHIPPED | OUTPATIENT
Start: 2021-09-07 | End: 2021-09-09 | Stop reason: SDUPTHER

## 2021-09-09 ENCOUNTER — TELEPHONE (OUTPATIENT)
Dept: INTERNAL MEDICINE | Facility: IMAGING CENTER | Age: 66
End: 2021-09-09

## 2021-09-09 DIAGNOSIS — E03.9 HYPOTHYROIDISM (ACQUIRED): ICD-10-CM

## 2021-09-09 RX ORDER — LEVOTHYROXINE SODIUM 100 MCG
100 TABLET ORAL EVERY MORNING
Qty: 90 TABLET | Refills: 3 | Status: SHIPPED
Start: 2021-09-09 | End: 2022-03-11

## 2021-09-09 NOTE — TELEPHONE ENCOUNTER
----- Message from Adal Dobbins sent at 9/9/2021  7:43 AM PDT -----  Regarding: Synthroid  Deborah,    Thanks.  That sounds like a good plan to me.  I am fine with the cost. Can you go ahead snd send in the prescription or let me know what zi should do on my end.    Adal

## 2021-10-14 ENCOUNTER — APPOINTMENT (RX ONLY)
Dept: URBAN - METROPOLITAN AREA CLINIC 35 | Facility: CLINIC | Age: 66
Setting detail: DERMATOLOGY
End: 2021-10-14

## 2021-10-14 DIAGNOSIS — Z41.9 ENCOUNTER FOR PROCEDURE FOR PURPOSES OTHER THAN REMEDYING HEALTH STATE, UNSPECIFIED: ICD-10-CM

## 2021-10-14 PROCEDURE — ? BOTOX

## 2021-10-14 PROCEDURE — ? ADDITIONAL NOTES

## 2021-10-14 NOTE — PROCEDURE: BOTOX
Right Periorbital Skin Units: 0
Consent: Verbal and written informed consent were obtained to include the following risks: pain, swelling, bruising, eyelid or eyebrow droop, and lack of visible improvement of wrinkles in the areas treated.  The skin was cleansed with alcohol. Injections were administered with a 32g needle into the following areas:
Additional Area 3 Units: 26
Reconstitution Date (Optional): 10/14/21
Dilution (U/0.1 Cc): 1.1
Additional Area 3 Location: Frontalis
Lot #: [id number]
Detail Level: Detailed
Additional Area 2 Units: 73
Additional Area 5 Location: perioral
Additional Area 1 Location: Glabella
Expiration Date (Month Year): 4/23
Additional Area 6 Location: platysma
Price (Use Numbers Only, No Special Characters Or $): 1500
Post-Care Instructions: Patient instructed to not lie down for 4 hours after injections and limit physical activity for 24 hours.
Additional Area 4 Location: Bunny lines
Additional Area 2 Location: Crows Feet

## 2021-11-02 ENCOUNTER — APPOINTMENT (RX ONLY)
Dept: URBAN - METROPOLITAN AREA CLINIC 35 | Facility: CLINIC | Age: 66
Setting detail: DERMATOLOGY
End: 2021-11-02

## 2021-11-02 DIAGNOSIS — Z41.9 ENCOUNTER FOR PROCEDURE FOR PURPOSES OTHER THAN REMEDYING HEALTH STATE, UNSPECIFIED: ICD-10-CM

## 2021-11-02 PROCEDURE — ? SKINPEN

## 2021-11-02 ASSESSMENT — LOCATION DETAILED DESCRIPTION DERM: LOCATION DETAILED: LEFT INFERIOR CENTRAL MALAR CHEEK

## 2021-11-02 ASSESSMENT — LOCATION ZONE DERM: LOCATION ZONE: FACE

## 2021-11-02 ASSESSMENT — LOCATION SIMPLE DESCRIPTION DERM: LOCATION SIMPLE: LEFT CHEEK

## 2021-11-23 DIAGNOSIS — N52.9 ERECTILE DYSFUNCTION, UNSPECIFIED ERECTILE DYSFUNCTION TYPE: ICD-10-CM

## 2021-11-23 RX ORDER — TADALAFIL 20 MG/1
TABLET ORAL
Qty: 10 TABLET | Refills: 5 | Status: SHIPPED | OUTPATIENT
Start: 2021-11-23 | End: 2022-08-04 | Stop reason: SDUPTHER

## 2021-12-07 ENCOUNTER — APPOINTMENT (RX ONLY)
Dept: URBAN - METROPOLITAN AREA CLINIC 35 | Facility: CLINIC | Age: 66
Setting detail: DERMATOLOGY
End: 2021-12-07

## 2021-12-07 DIAGNOSIS — Z41.9 ENCOUNTER FOR PROCEDURE FOR PURPOSES OTHER THAN REMEDYING HEALTH STATE, UNSPECIFIED: ICD-10-CM

## 2021-12-07 PROCEDURE — ? SKINPEN

## 2021-12-07 ASSESSMENT — LOCATION DETAILED DESCRIPTION DERM: LOCATION DETAILED: LEFT INFERIOR CENTRAL MALAR CHEEK

## 2021-12-07 ASSESSMENT — LOCATION ZONE DERM: LOCATION ZONE: FACE

## 2021-12-07 ASSESSMENT — LOCATION SIMPLE DESCRIPTION DERM: LOCATION SIMPLE: LEFT CHEEK

## 2022-01-09 DIAGNOSIS — M54.2 NECK PAIN: ICD-10-CM

## 2022-01-09 RX ORDER — MELOXICAM 7.5 MG/1
7.5 TABLET ORAL
Qty: 60 TABLET | Refills: 5 | Status: SHIPPED | OUTPATIENT
Start: 2022-01-09 | End: 2022-05-03

## 2022-01-19 ENCOUNTER — APPOINTMENT (RX ONLY)
Dept: URBAN - METROPOLITAN AREA CLINIC 35 | Facility: CLINIC | Age: 67
Setting detail: DERMATOLOGY
End: 2022-01-19

## 2022-01-19 DIAGNOSIS — Z41.9 ENCOUNTER FOR PROCEDURE FOR PURPOSES OTHER THAN REMEDYING HEALTH STATE, UNSPECIFIED: ICD-10-CM

## 2022-01-19 PROCEDURE — ? SKINPEN

## 2022-01-19 ASSESSMENT — LOCATION SIMPLE DESCRIPTION DERM: LOCATION SIMPLE: LEFT CHEEK

## 2022-01-19 ASSESSMENT — LOCATION DETAILED DESCRIPTION DERM: LOCATION DETAILED: LEFT INFERIOR CENTRAL MALAR CHEEK

## 2022-01-19 ASSESSMENT — LOCATION ZONE DERM: LOCATION ZONE: FACE

## 2022-01-19 NOTE — PROCEDURE: SKINPEN
Location #1: neck and cheeks
Depth In Mm: 1
Depth In Mm: 0.25
Location #4: forehead
Post-Care Instructions: After the procedure, take precautions agains sun exposure. Do not apply sunscreen for 12 hours after the procedure. Do not apply make-up for 12 hours after the procedure. Avoid alcohol based toners for 10-14 days. After 2-3 days patients can return to normal skin care regimen. Pt will return in one month for additional treatment.
Treatment Number (Optional): 4
Price (Use Numbers Only, No Special Characters Or $): 500
Topical Anesthesia?: 23% lidocaine, 7% tetracaine
Location #3: nose
Location #2: chin
Depth In Mm: 0.5
Consent: Written consent obtained, risks reviewed including but not limited to pain, scarring, infection and incomplete improvement. Anteage serum applied during treatment, pt will return in one month for additional treatment.
Detail Level: Zone

## 2022-03-01 ENCOUNTER — HOSPITAL ENCOUNTER (OUTPATIENT)
Facility: MEDICAL CENTER | Age: 67
End: 2022-03-01
Attending: INTERNAL MEDICINE
Payer: MEDICARE

## 2022-03-01 ENCOUNTER — APPOINTMENT (RX ONLY)
Dept: URBAN - METROPOLITAN AREA CLINIC 35 | Facility: CLINIC | Age: 67
Setting detail: DERMATOLOGY
End: 2022-03-01

## 2022-03-01 ENCOUNTER — NON-PROVIDER VISIT (OUTPATIENT)
Dept: INTERNAL MEDICINE | Facility: IMAGING CENTER | Age: 67
End: 2022-03-01
Payer: MEDICARE

## 2022-03-01 DIAGNOSIS — Z41.9 ENCOUNTER FOR PROCEDURE FOR PURPOSES OTHER THAN REMEDYING HEALTH STATE, UNSPECIFIED: ICD-10-CM

## 2022-03-01 DIAGNOSIS — Z12.5 PROSTATE CANCER SCREENING: ICD-10-CM

## 2022-03-01 DIAGNOSIS — N40.1 BPH WITH OBSTRUCTION/LOWER URINARY TRACT SYMPTOMS: ICD-10-CM

## 2022-03-01 DIAGNOSIS — Z01.89 ENCOUNTER FOR ROUTINE LABORATORY TESTING: ICD-10-CM

## 2022-03-01 DIAGNOSIS — I10 ESSENTIAL HYPERTENSION: ICD-10-CM

## 2022-03-01 DIAGNOSIS — Z79.899 LONG TERM USE OF DRUG: ICD-10-CM

## 2022-03-01 DIAGNOSIS — Z00.00 WELLNESS EXAMINATION: ICD-10-CM

## 2022-03-01 DIAGNOSIS — E03.9 HYPOTHYROIDISM (ACQUIRED): ICD-10-CM

## 2022-03-01 DIAGNOSIS — N13.8 BPH WITH OBSTRUCTION/LOWER URINARY TRACT SYMPTOMS: ICD-10-CM

## 2022-03-01 DIAGNOSIS — Z86.39 HISTORY OF VITAMIN D DEFICIENCY: ICD-10-CM

## 2022-03-01 DIAGNOSIS — E78.2 MIXED HYPERLIPIDEMIA WITH APOLIPOPROTEIN E3 VARIANT: ICD-10-CM

## 2022-03-01 DIAGNOSIS — R74.8 ELEVATED CPK: ICD-10-CM

## 2022-03-01 LAB
25(OH)D3 SERPL-MCNC: 42 NG/ML (ref 30–100)
ALBUMIN SERPL BCP-MCNC: 4.4 G/DL (ref 3.2–4.9)
ALBUMIN/GLOB SERPL: 1.7 G/DL
ALP SERPL-CCNC: 41 U/L (ref 30–99)
ALT SERPL-CCNC: 27 U/L (ref 2–50)
ANION GAP SERPL CALC-SCNC: 11 MMOL/L (ref 7–16)
APPEARANCE UR: CLEAR
AST SERPL-CCNC: 28 U/L (ref 12–45)
BASOPHILS # BLD AUTO: 1.5 % (ref 0–1.8)
BASOPHILS # BLD: 0.09 K/UL (ref 0–0.12)
BILIRUB SERPL-MCNC: 0.8 MG/DL (ref 0.1–1.5)
BILIRUB UR QL STRIP.AUTO: NEGATIVE
BUN SERPL-MCNC: 19 MG/DL (ref 8–22)
CALCIUM SERPL-MCNC: 9.4 MG/DL (ref 8.5–10.5)
CHLORIDE SERPL-SCNC: 107 MMOL/L (ref 96–112)
CK SERPL-CCNC: 265 U/L (ref 0–154)
CO2 SERPL-SCNC: 23 MMOL/L (ref 20–33)
COLOR UR: YELLOW
CREAT SERPL-MCNC: 0.88 MG/DL (ref 0.5–1.4)
EOSINOPHIL # BLD AUTO: 0.51 K/UL (ref 0–0.51)
EOSINOPHIL NFR BLD: 8.6 % (ref 0–6.9)
ERYTHROCYTE [DISTWIDTH] IN BLOOD BY AUTOMATED COUNT: 42 FL (ref 35.9–50)
GLOBULIN SER CALC-MCNC: 2.6 G/DL (ref 1.9–3.5)
GLUCOSE SERPL-MCNC: 85 MG/DL (ref 65–99)
GLUCOSE UR STRIP.AUTO-MCNC: NEGATIVE MG/DL
HCT VFR BLD AUTO: 48.3 % (ref 42–52)
HGB BLD-MCNC: 16.3 G/DL (ref 14–18)
IMM GRANULOCYTES # BLD AUTO: 0.01 K/UL (ref 0–0.11)
IMM GRANULOCYTES NFR BLD AUTO: 0.2 % (ref 0–0.9)
KETONES UR STRIP.AUTO-MCNC: NEGATIVE MG/DL
LEUKOCYTE ESTERASE UR QL STRIP.AUTO: NEGATIVE
LYMPHOCYTES # BLD AUTO: 2.05 K/UL (ref 1–4.8)
LYMPHOCYTES NFR BLD: 34.4 % (ref 22–41)
MCH RBC QN AUTO: 30.6 PG (ref 27–33)
MCHC RBC AUTO-ENTMCNC: 33.7 G/DL (ref 33.7–35.3)
MCV RBC AUTO: 90.8 FL (ref 81.4–97.8)
MICRO URNS: NORMAL
MONOCYTES # BLD AUTO: 0.71 K/UL (ref 0–0.85)
MONOCYTES NFR BLD AUTO: 11.9 % (ref 0–13.4)
NEUTROPHILS # BLD AUTO: 2.59 K/UL (ref 1.82–7.42)
NEUTROPHILS NFR BLD: 43.4 % (ref 44–72)
NITRITE UR QL STRIP.AUTO: NEGATIVE
NRBC # BLD AUTO: 0 K/UL
NRBC BLD-RTO: 0 /100 WBC
PH UR STRIP.AUTO: 6.5 [PH] (ref 5–8)
PLATELET # BLD AUTO: 256 K/UL (ref 164–446)
PMV BLD AUTO: 11.8 FL (ref 9–12.9)
POTASSIUM SERPL-SCNC: 4.6 MMOL/L (ref 3.6–5.5)
PROT SERPL-MCNC: 7 G/DL (ref 6–8.2)
PROT UR QL STRIP: NEGATIVE MG/DL
PSA SERPL-MCNC: 5.27 NG/ML (ref 0–4)
RBC # BLD AUTO: 5.32 M/UL (ref 4.7–6.1)
RBC UR QL AUTO: NEGATIVE
SODIUM SERPL-SCNC: 141 MMOL/L (ref 135–145)
SP GR UR STRIP.AUTO: 1.02
T3 SERPL-MCNC: 67.3 NG/DL (ref 60–181)
TSH SERPL DL<=0.005 MIU/L-ACNC: 4.63 UIU/ML (ref 0.38–5.33)
UROBILINOGEN UR STRIP.AUTO-MCNC: 0.2 MG/DL
WBC # BLD AUTO: 6 K/UL (ref 4.8–10.8)

## 2022-03-01 PROCEDURE — 99999 PR NO CHARGE: CPT | Performed by: INTERNAL MEDICINE

## 2022-03-01 PROCEDURE — 80061 LIPID PANEL: CPT | Mod: XU

## 2022-03-01 PROCEDURE — 84480 ASSAY TRIIODOTHYRONINE (T3): CPT

## 2022-03-01 PROCEDURE — 84443 ASSAY THYROID STIM HORMONE: CPT

## 2022-03-01 PROCEDURE — 83704 LIPOPROTEIN BLD QUAN PART: CPT

## 2022-03-01 PROCEDURE — 85025 COMPLETE CBC W/AUTO DIFF WBC: CPT

## 2022-03-01 PROCEDURE — 82306 VITAMIN D 25 HYDROXY: CPT

## 2022-03-01 PROCEDURE — 81003 URINALYSIS AUTO W/O SCOPE: CPT

## 2022-03-01 PROCEDURE — 84153 ASSAY OF PSA TOTAL: CPT

## 2022-03-01 PROCEDURE — 82550 ASSAY OF CK (CPK): CPT

## 2022-03-01 PROCEDURE — ? SKINPEN

## 2022-03-01 PROCEDURE — 80053 COMPREHEN METABOLIC PANEL: CPT

## 2022-03-01 ASSESSMENT — LOCATION DETAILED DESCRIPTION DERM: LOCATION DETAILED: LEFT CENTRAL MALAR CHEEK

## 2022-03-01 ASSESSMENT — LOCATION SIMPLE DESCRIPTION DERM: LOCATION SIMPLE: LEFT CHEEK

## 2022-03-01 ASSESSMENT — LOCATION ZONE DERM: LOCATION ZONE: FACE

## 2022-03-01 NOTE — PROCEDURE: SKINPEN
Depth In Mm: 1.75
Depth In Mm: 0.75
Depth In Mm: 0.5
Depth In Mm: 0.25
Post-Care Instructions: After the procedure, take precautions agains sun exposure. Do not apply sunscreen for 12 hours after the procedure. Do not apply make-up for 12 hours after the procedure. Avoid alcohol based toners for 10-14 days. After 2-3 days patients can return to normal skin care regimen. Pt will return in one month for additional treatment.
Detail Level: Zone
Treatment Number (Optional): 6
Location #2: chin
Location #4: forehead
Location #1: neck and cheeks
Location #3: nose
Consent: Written consent obtained, risks reviewed including but not limited to pain, scarring, infection and incomplete improvement. Anteage serum applied during treatment, pt will return in one month for additional treatment.
Price (Use Numbers Only, No Special Characters Or $): 500.00

## 2022-03-04 LAB
CHOLEST SERPL-MCNC: 195 MG/DL
HDL PARTICAL NO Q4363: 39.4 UMOL/L
HDL SIZE Q4361: 9.3 NM
HDLC SERPL-MCNC: 70 MG/DL (ref 40–59)
HLD.LARGE SERPL-SCNC: 9.2 UMOL/L
L VLDL PART NO Q4357: <1.5 NMOL/L
LDL SERPL QN: 20.8 NM
LDL SERPL-SCNC: 1220 NMOL/L
LDL SMALL SERPL-SCNC: 527 NMOL/L
LDLC SERPL CALC-MCNC: 107 MG/DL
PATHOLOGY STUDY: ABNORMAL
TRIGL SERPL-MCNC: 90 MG/DL (ref 30–149)
VLDL SIZE Q4362: 46.1 NM

## 2022-03-11 ENCOUNTER — OFFICE VISIT (OUTPATIENT)
Dept: INTERNAL MEDICINE | Facility: IMAGING CENTER | Age: 67
End: 2022-03-11
Payer: MEDICARE

## 2022-03-11 ENCOUNTER — HOSPITAL ENCOUNTER (OUTPATIENT)
Facility: MEDICAL CENTER | Age: 67
End: 2022-03-11
Attending: INTERNAL MEDICINE
Payer: MEDICARE

## 2022-03-11 VITALS
TEMPERATURE: 98.1 F | RESPIRATION RATE: 14 BRPM | WEIGHT: 173 LBS | DIASTOLIC BLOOD PRESSURE: 60 MMHG | HEIGHT: 68 IN | BODY MASS INDEX: 26.22 KG/M2 | OXYGEN SATURATION: 96 % | HEART RATE: 64 BPM | SYSTOLIC BLOOD PRESSURE: 100 MMHG

## 2022-03-11 DIAGNOSIS — I10 ESSENTIAL HYPERTENSION: ICD-10-CM

## 2022-03-11 DIAGNOSIS — R06.83 SNORING: ICD-10-CM

## 2022-03-11 DIAGNOSIS — R97.20 ELEVATED PSA: ICD-10-CM

## 2022-03-11 DIAGNOSIS — E78.2 MIXED HYPERLIPIDEMIA WITH APOLIPOPROTEIN E3 VARIANT: ICD-10-CM

## 2022-03-11 DIAGNOSIS — E03.9 HYPOTHYROIDISM (ACQUIRED): ICD-10-CM

## 2022-03-11 DIAGNOSIS — R74.8 ELEVATED CPK: ICD-10-CM

## 2022-03-11 PROCEDURE — 84153 ASSAY OF PSA TOTAL: CPT

## 2022-03-11 PROCEDURE — 84154 ASSAY OF PSA FREE: CPT

## 2022-03-11 PROCEDURE — 99214 OFFICE O/P EST MOD 30 MIN: CPT | Performed by: INTERNAL MEDICINE

## 2022-03-11 RX ORDER — LEVOTHYROXINE SODIUM 112 MCG
112 TABLET ORAL
Qty: 90 TABLET | Refills: 3 | Status: SHIPPED
Start: 2022-03-11 | End: 2022-09-23

## 2022-03-11 ASSESSMENT — ENCOUNTER SYMPTOMS: MYALGIAS: 0

## 2022-03-11 ASSESSMENT — FIBROSIS 4 INDEX: FIB4 SCORE: 1.347150628109126784

## 2022-03-11 NOTE — PROGRESS NOTES
Established Patient Note   HPI:        Adal comes in today to follow up HTN, hyperlipidemia and hypothyroid. He has done lab work recently. He has been having some neck pain recently; he has done physical therapy which has helped. He states his wife has complained of him snoring at night and possibly apneic spells.    Past Medical History:   Diagnosis Date   • ASTHMA     mild intermittent   • Family history of hypertension    • History of stroke    • HTN (hypertension) 2011   • Hyperlipidemia    • Hypothyroid 7/1/2016   • Psoriasis    • Stroke (HCC) 1997    Vertebral artery disection       Current Outpatient Medications   Medication Sig Dispense Refill   • SYNTHROID 112 MCG Tab Take 1 Tablet by mouth every morning on an empty stomach. 90 Tablet 3   • meloxicam (MOBIC) 7.5 MG Tab Take 1 Tablet by mouth 1/2 hour after breakfast. One tablet daily with breakfast as needed.  No advil/aleve/motrin/ibuprofen on same day. 60 Tablet 5   • tadalafil (CIALIS) 20 MG tablet TAKE 1 TABLET BY MOUTH AS NEEDED FOR ERECTILE DYSFUNCTION 10 Tablet 5   • benazepril (LOTENSIN) 5 MG Tab TAKE 1 TABLET BY MOUTH EVERY DAY 90 tablet 3   • calcipotriene (DOVONEX) 0.005 % Cream APPLY 2 GRAMS EXTERNALLY TO THE AFFECTED AREA TWICE DAILY 120 g 6   • rosuvastatin (CRESTOR) 10 MG Tab TAKE 1 TABLET BY MOUTH DAILY (Patient taking differently: Take 5 mg by mouth every day.) 90 tablet 3   • albuterol 108 (90 Base) MCG/ACT Aero Soln inhalation aerosol Inhale 2 Puffs by mouth every 6 hours as needed. 8.5 g 5   • fluticasone (FLOVENT HFA) 110 MCG/ACT Aerosol Inhale 1 Puff by mouth 2 Times a Day. 1 Inhaler 3   • B Complex Cap Take 1 Cap by mouth every day. 100 Cap 3   • Cholecalciferol (VITAMIN D) 2000 UNITS Cap Take 1 Cap by mouth every day.     • aspirin EC (ECOTRIN) 81 MG Tablet Delayed Response Take 81 mg by mouth every day.       No current facility-administered medications for this visit.         No Known Allergies      Social History     Tobacco  "Use   • Smoking status: Light Tobacco Smoker     Types: Cigars   • Smokeless tobacco: Never Used   • Tobacco comment: 1 cigar per week   Substance Use Topics   • Alcohol use: Yes     Alcohol/week: 8.4 oz     Types: 14 Standard drinks or equivalent per week   • Drug use: No       Past Surgical History:   Procedure Laterality Date   • OTHER  age 12     nasal polyps        Review of Systems   Constitutional: Negative for malaise/fatigue.   Genitourinary:        Nocturia typically once a night   Musculoskeletal: Negative for myalgias.        No generalized muscle weakness.       Ambulatory Vitals  /60 (BP Location: Left arm, Patient Position: Sitting, BP Cuff Size: Adult)   Pulse 64   Temp 36.7 °C (98.1 °F) (Temporal)   Resp 14   Ht 1.727 m (5' 8\")   Wt 78.5 kg (173 lb)   SpO2 96%   BMI 26.30 kg/m²     Physical Exam  Constitutional:       Appearance: Normal appearance.   Cardiovascular:      Rate and Rhythm: Normal rate and regular rhythm.      Heart sounds: Normal heart sounds. No murmur heard.    No friction rub. No gallop.      Comments: No carotid bruits bilaterally.  Pulmonary:      Effort: Pulmonary effort is normal.      Breath sounds: Normal breath sounds. No wheezing or rales.   Abdominal:      General: Abdomen is flat. There is no distension.      Palpations: Abdomen is soft. There is no mass.      Tenderness: There is no abdominal tenderness. There is no right CVA tenderness or left CVA tenderness.   Genitourinary:     Prostate: Normal.   Musculoskeletal:      Right lower leg: No edema.      Left lower leg: No edema.   Neurological:      General: No focal deficit present.      Coordination: Coordination normal.      Gait: Gait normal.         Component      Latest Ref Rng & Units 1/22/2021 8/5/2021 3/1/2022   WBC      4.8 - 10.8 K/uL 5.1  6.0   RBC      4.70 - 6.10 M/uL 5.32  5.32   Hemoglobin      14.0 - 18.0 g/dL 16.1  16.3   Hematocrit      42.0 - 52.0 % 49.2  48.3   MCV      81.4 - 97.8 fL " 92.5  90.8   MCH      27.0 - 33.0 pg 30.3  30.6   MCHC      33.7 - 35.3 g/dL 32.7 (L)  33.7   RDW      35.9 - 50.0 fL 44.0  42.0   Platelet Count      164 - 446 K/uL 255  256   MPV      9.0 - 12.9 fL 11.8  11.8   Neutrophils-Polys      44.00 - 72.00 % 40.70 (L)  43.40 (L)   Lymphocytes      22.00 - 41.00 % 39.40  34.40   Monocytes      0.00 - 13.40 % 11.00  11.90   Eosinophils      0.00 - 6.90 % 6.90  8.60 (H)   Basophils      0.00 - 1.80 % 1.80  1.50   Immature Granulocytes      0.00 - 0.90 % 0.20  0.20   Nucleated RBC      /100 WBC 0.00  0.00   Neutrophils (Absolute)      1.82 - 7.42 K/uL 2.08  2.59   Lymphs (Absolute)      1.00 - 4.80 K/uL 2.01  2.05   Monos (Absolute)      0.00 - 0.85 K/uL 0.56  0.71   Eos (Absolute)      0.00 - 0.51 K/uL 0.35  0.51   Baso (Absolute)      0.00 - 0.12 K/uL 0.09  0.09   Immature Granulocytes (abs)      0.00 - 0.11 K/uL 0.01  0.01   NRBC (Absolute)      K/uL 0.00  0.00   Sodium      135 - 145 mmol/L 138 137 141   Potassium      3.6 - 5.5 mmol/L 4.3 4.8 4.6   Chloride      96 - 112 mmol/L 106 103 107   Co2      20 - 33 mmol/L 25 25 23   Anion Gap      7.0 - 16.0 7.0 9.0 11.0   Glucose      65 - 99 mg/dL 80 83 85   Bun      8 - 22 mg/dL 13 17 19   Creatinine      0.50 - 1.40 mg/dL 0.79 0.84 0.88   Calcium      8.5 - 10.5 mg/dL 9.2 9.3 9.4   AST(SGOT)      12 - 45 U/L 37 32 28   ALT(SGPT)      2 - 50 U/L 28 27 27   Alkaline Phosphatase      30 - 99 U/L 47 42 41   Total Bilirubin      0.1 - 1.5 mg/dL 0.8 0.8 0.8   Albumin      3.2 - 4.9 g/dL 4.2 4.2 4.4   Total Protein      6.0 - 8.2 g/dL 6.8 7.1 7.0   Globulin      1.9 - 3.5 g/dL 2.6 2.9 2.6   A-G Ratio      g/dL 1.6 1.4 1.7   Cholesterol,Tot      <=199 mg/dL 181 203 (H) 195   Triglycerides      30 - 149 mg/dL 88 96 90   HDL      40 - 59 mg/dL 66 (H) 78 70 (H)   LDL Cholesterol      <=129 mg/dL 97  107   LDL Particle      <=1135 nmol/L 1238 (H)  1220 (H)   Small LDL      <=634 nmol/L 292  527   L-VLDL Particle No.      <=2.7 nmol/L  <1.5  <1.5   HDL Particle No.      >=33.0 umol/L 36.9  39.4   L-HDL Particle No.      >=4.2 umol/L 9.0  9.2   LDL Particle Size      >=20.7 nm 21.0  20.8   VLDL Size      <=46.7 nm 41.8  46.1   HDL Size      >=8.9 nm 9.2  9.3   EER LipoFit by NMR       See Note  See Note   Color       Yellow  Yellow   Character       Clear  Clear   Specific Gravity      <1.035 1.019  1.020   Ph      5.0 - 8.0 6.0  6.5   Glucose      Negative mg/dL Negative  Negative   Ketones      Negative mg/dL Negative  Negative   Protein      Negative mg/dL Negative  Negative   Bilirubin      Negative Negative  Negative   Urobilinogen, Urine      Negative 0.2  0.2   Nitrite      Negative Negative  Negative   Leukocyte Esterase      Negative Negative  Negative   Occult Blood      Negative Negative  Negative   Micro Urine Req       see below  see below   LDL      <100 mg/dL  106 (H)    GFR If African American      >60 mL/min/1.73 m 2 >60 >60 >60   GFR If Non African American      >60 mL/min/1.73 m 2 >60 >60 >60   T3      60.0 - 181.0 ng/dL 79.3  67.3   TSH      0.380 - 5.330 uIU/mL 2.900  4.630   CPK Total      0 - 154 U/L 293 (H) 246 (H) 265 (H)   Prostatic Specific Antigen Tot      0.00 - 4.00 ng/mL 3.51  5.27 (H)   25-Hydroxy   Vitamin D 25      30 - 100 ng/mL 37  42   Vitamin B12 -True Cobalamin      211 - 911 pg/mL  1060 (H)      Assessment and Plan:     1. Essential hypertension  CT-CARDIAC SCORING    US-TOTAL VASCULAR SCREENING (S/P)    Comp Metabolic Panel    Normotensive taking benazepril 5 mg qd   2. Mixed hyperlipidemia with apolipoprotein E3 variant  CT-CARDIAC SCORING    US-TOTAL VASCULAR SCREENING (S/P)    Comp Metabolic Panel    Lipid Profile    LDL-C 107 mg/dl without elevated small LDL-P taking rosuvastatin 5 mg qd   3. Hypothyroidism (acquired)  SYNTHROID 112 MCG Tab    TSH    TRIIDOTHYRONINE    TSH    TRIIDOTHYRONINE   4. Elevated PSA  PSA TOTAL + %FREE    Comp Metabolic Panel   5. Snoring  Referral to Pulmonary and Sleep  Medicine   6. Elevated CPK  Comp Metabolic Panel    CREATINE KINASE    Likely due to statin Rx but asymptomatic     Will order US arterial for PAD screening and CAC screening since his last evaluation has been a little over 5 years. Increase synthroid from 100 mcg qam to 112 mcg qam. Repeat PSA with % free PSA today since PSA is elevated on recent lab. Will not increase rosuvastatin since small LDL-P is not elevated and his CPK level is elevated. Check TSH/T3 in 3 months. Check CMP, lipid panel, CPK, TSH/T3 in 6 months.    Toby Minor M.D.

## 2022-03-17 LAB
PSA FREE MFR SERPL: 16 %
PSA FREE SERPL-MCNC: 0.9 NG/ML
PSA SERPL-MCNC: 5.6 NG/ML (ref 0–4)

## 2022-03-18 ENCOUNTER — PATIENT MESSAGE (OUTPATIENT)
Dept: INTERNAL MEDICINE | Facility: IMAGING CENTER | Age: 67
End: 2022-03-18
Payer: MEDICARE

## 2022-03-18 DIAGNOSIS — L40.9 PSORIASIS: ICD-10-CM

## 2022-03-18 DIAGNOSIS — R97.20 ELEVATED PSA: ICD-10-CM

## 2022-03-18 RX ORDER — CALCIPOTRIENE 50 UG/G
CREAM TOPICAL
Qty: 120 G | Refills: 6 | Status: SHIPPED | OUTPATIENT
Start: 2022-03-18 | End: 2023-02-28 | Stop reason: SDUPTHER

## 2022-03-18 NOTE — PROGRESS NOTES
Reviewed result of repeat PSA with Abner on phone. His total PSA is 5.6 with percent free PSA of 16; this places him at 34% risk of prostate cancer. Will order MRI prostate for evaluation; he agrees.

## 2022-03-18 NOTE — TELEPHONE ENCOUNTER
----- Message from Su Smith R.N. sent at 3/18/2022 12:01 PM PDT -----  Regarding: FW: prescription refill    ----- Message -----  From: Adal Dobbins  Sent: 3/18/2022  11:17 AM PDT  To: Toby Minor M.D.  Subject: prescription refill                              Can you renew my Calcipotriene 0.005% cream Rx?  Thx!

## 2022-04-05 ENCOUNTER — HOSPITAL ENCOUNTER (OUTPATIENT)
Dept: RADIOLOGY | Facility: MEDICAL CENTER | Age: 67
End: 2022-04-05
Attending: INTERNAL MEDICINE
Payer: COMMERCIAL

## 2022-04-05 ENCOUNTER — APPOINTMENT (RX ONLY)
Dept: URBAN - METROPOLITAN AREA CLINIC 35 | Facility: CLINIC | Age: 67
Setting detail: DERMATOLOGY
End: 2022-04-05

## 2022-04-05 DIAGNOSIS — Z41.9 ENCOUNTER FOR PROCEDURE FOR PURPOSES OTHER THAN REMEDYING HEALTH STATE, UNSPECIFIED: ICD-10-CM

## 2022-04-05 DIAGNOSIS — I10 ESSENTIAL HYPERTENSION: ICD-10-CM

## 2022-04-05 DIAGNOSIS — E78.2 MIXED HYPERLIPIDEMIA WITH APOLIPOPROTEIN E3 VARIANT: ICD-10-CM

## 2022-04-05 PROBLEM — R93.1 AGATSTON CAC SCORE, >400: Status: ACTIVE | Noted: 2022-04-05

## 2022-04-05 PROCEDURE — ? SKINPEN

## 2022-04-05 PROCEDURE — 306723 US-TOTAL VASCULAR SCREENING (S/P)

## 2022-04-05 PROCEDURE — 4410556 CT-CARDIAC SCORING (SELF PAY ONLY)

## 2022-04-05 PROCEDURE — 93998 UNLISTD NONINVAS VASC DX STD: CPT

## 2022-04-05 ASSESSMENT — LOCATION DETAILED DESCRIPTION DERM: LOCATION DETAILED: LEFT INFERIOR CENTRAL MALAR CHEEK

## 2022-04-05 ASSESSMENT — LOCATION ZONE DERM: LOCATION ZONE: FACE

## 2022-04-05 ASSESSMENT — LOCATION SIMPLE DESCRIPTION DERM: LOCATION SIMPLE: LEFT CHEEK

## 2022-04-05 NOTE — PROCEDURE: SKINPEN
Depth In Mm: 0.25
Location #2: chin
Location #3: nose
Treatment Number (Optional): 0
Location #4: forehead
Depth In Mm: 0.75
Depth In Mm: 1.75
Consent: Written consent obtained, risks reviewed including but not limited to pain, scarring, infection
Detail Level: Zone
Post-Care Instructions: After the procedure, take precautions agains sun exposure. Do not apply sunscreen for 12 hours after the procedure. Do not apply make-up for 12 hours after the procedure. Avoid alcohol based toners for 10-14 days. After 2-3 days patients can return to normal skin care regimen. Pt will return in one month for additional treatment. Anteage serum applied to skin durning treatment
Depth In Mm: 0.5
Location #1: neck and cheeks
Price (Use Numbers Only, No Special Characters Or $): 500.00

## 2022-04-06 DIAGNOSIS — R93.1 AGATSTON CAC SCORE, >400: ICD-10-CM

## 2022-04-06 DIAGNOSIS — E78.2 MIXED HYPERLIPIDEMIA WITH APOLIPOPROTEIN E3 VARIANT: ICD-10-CM

## 2022-04-06 DIAGNOSIS — I10 ESSENTIAL HYPERTENSION: ICD-10-CM

## 2022-04-06 NOTE — PROGRESS NOTES
Reviewed results of arterial vascular US and CAC with Abner. US reveals mild plaque in carotid bifurcations bilaterally. CAC score has increased to 587 compared to prior CAC done 2014 with score of 133. Recommend he do TM with myocardial perfusion for further evaluation.

## 2022-04-07 ENCOUNTER — APPOINTMENT (RX ONLY)
Dept: URBAN - METROPOLITAN AREA CLINIC 35 | Facility: CLINIC | Age: 67
Setting detail: DERMATOLOGY
End: 2022-04-07

## 2022-04-07 DIAGNOSIS — Z41.9 ENCOUNTER FOR PROCEDURE FOR PURPOSES OTHER THAN REMEDYING HEALTH STATE, UNSPECIFIED: ICD-10-CM

## 2022-04-07 PROCEDURE — ? ADDITIONAL NOTES

## 2022-04-07 PROCEDURE — ? BOTOX

## 2022-04-07 NOTE — PROCEDURE: BOTOX
Right Periorbital Skin Units: 0
Consent: Verbal and written informed consent were obtained to include the following risks: pain, swelling, bruising, eyelid or eyebrow droop, and lack of visible improvement of wrinkles in the areas treated.  The skin was cleansed with alcohol. Injections were administered with a 32g needle into the following areas:
Additional Area 3 Units: 26
Reconstitution Date (Optional): 4/7/22
Dilution (U/0.1 Cc): 1.1
Additional Area 3 Location: Frontalis
Lot #: [id number]
Detail Level: Detailed
Additional Area 2 Units: 73
Additional Area 5 Location: perioral
Additional Area 1 Location: Glabella
Expiration Date (Month Year): 5/24
Additional Area 6 Location: platysma
Price (Use Numbers Only, No Special Characters Or $): 1500
Post-Care Instructions: Patient instructed to not lie down for 4 hours after injections and limit physical activity for 24 hours.
Additional Area 4 Location: Bunny lines
Additional Area 2 Location: Crows Feet

## 2022-04-08 DIAGNOSIS — R93.1 AGATSTON CAC SCORE, >400: ICD-10-CM

## 2022-04-08 DIAGNOSIS — L40.9 PSORIASIS: ICD-10-CM

## 2022-04-08 DIAGNOSIS — I65.23 ATHEROSCLEROSIS OF BOTH CAROTID ARTERIES: ICD-10-CM

## 2022-04-08 DIAGNOSIS — E78.41 ELEVATED LP(A): ICD-10-CM

## 2022-04-08 DIAGNOSIS — E78.2 MIXED HYPERLIPIDEMIA WITH APOLIPOPROTEIN E3 VARIANT: ICD-10-CM

## 2022-04-08 DIAGNOSIS — R68.82 LIBIDO, DECREASED: ICD-10-CM

## 2022-04-08 RX ORDER — ROSUVASTATIN CALCIUM 10 MG/1
10 TABLET, COATED ORAL DAILY
Qty: 90 TABLET | Refills: 3
Start: 2022-04-08 | End: 2022-05-03

## 2022-04-08 NOTE — PROGRESS NOTES
Due to result of recent CAC having elevated significantly over past 5 years along with history of hyperlipidemia will check Lp(a) and apoB. Advised him to increase his rosuvastatin to 10 mg qd in attempt to lower LDL-C below 70 mg/dl. He would also like to have his uric acid level checked along with testosterone.

## 2022-04-11 DIAGNOSIS — E78.2 MIXED HYPERLIPIDEMIA WITH APOLIPOPROTEIN E3 VARIANT: ICD-10-CM

## 2022-04-11 DIAGNOSIS — R93.1 AGATSTON CAC SCORE, >400: ICD-10-CM

## 2022-04-11 DIAGNOSIS — I65.23 ATHEROSCLEROSIS OF BOTH CAROTID ARTERIES: ICD-10-CM

## 2022-04-11 DIAGNOSIS — E78.41 ELEVATED LP(A): ICD-10-CM

## 2022-04-13 ENCOUNTER — TELEPHONE (OUTPATIENT)
Dept: VASCULAR LAB | Facility: MEDICAL CENTER | Age: 67
End: 2022-04-13
Payer: MEDICARE

## 2022-04-13 NOTE — TELEPHONE ENCOUNTER
Called and left  for pt offering new initial appt with Bloch on May 3rd at 1:40pm. Pt to call back and let us know if that works or not.     ----- Message from Michael J Bloch, M.D. sent at 4/12/2022  5:26 PM PDT -----  Regarding: ? 5-3 : [facility] - Wants to be seen sooner than July  How about may 3 at 140??  Mb    ----- Message -----  From: Gilson Decker, Med Ass't  Sent: 4/12/2022   1:43 PM PDT  To: Michael J Bloch, M.D.  Subject: [facility] - Wants to be seen sooner than Ju#    Please advise.

## 2022-04-14 ENCOUNTER — NON-PROVIDER VISIT (OUTPATIENT)
Dept: INTERNAL MEDICINE | Facility: IMAGING CENTER | Age: 67
End: 2022-04-14
Payer: MEDICARE

## 2022-04-14 ENCOUNTER — HOSPITAL ENCOUNTER (OUTPATIENT)
Facility: MEDICAL CENTER | Age: 67
End: 2022-04-14
Attending: INTERNAL MEDICINE
Payer: MEDICARE

## 2022-04-14 DIAGNOSIS — Z01.89 ENCOUNTER FOR ROUTINE LABORATORY TESTING: ICD-10-CM

## 2022-04-14 DIAGNOSIS — I65.23 ATHEROSCLEROSIS OF BOTH CAROTID ARTERIES: ICD-10-CM

## 2022-04-14 DIAGNOSIS — L40.9 PSORIASIS: ICD-10-CM

## 2022-04-14 DIAGNOSIS — E78.2 MIXED HYPERLIPIDEMIA WITH APOLIPOPROTEIN E3 VARIANT: ICD-10-CM

## 2022-04-14 DIAGNOSIS — R68.82 LIBIDO, DECREASED: ICD-10-CM

## 2022-04-14 DIAGNOSIS — R93.1 AGATSTON CAC SCORE, >400: ICD-10-CM

## 2022-04-14 LAB
FSH SERPL-ACNC: 4.8 MIU/ML (ref 1.5–12.4)
LH SERPL-ACNC: 6 IU/L (ref 1.7–8.6)
URATE SERPL-MCNC: 6.4 MG/DL (ref 2.5–8.3)

## 2022-04-14 PROCEDURE — 83002 ASSAY OF GONADOTROPIN (LH): CPT

## 2022-04-14 PROCEDURE — 84270 ASSAY OF SEX HORMONE GLOBUL: CPT

## 2022-04-14 PROCEDURE — 83695 ASSAY OF LIPOPROTEIN(A): CPT

## 2022-04-14 PROCEDURE — 83001 ASSAY OF GONADOTROPIN (FSH): CPT

## 2022-04-14 PROCEDURE — 84550 ASSAY OF BLOOD/URIC ACID: CPT

## 2022-04-14 PROCEDURE — 84403 ASSAY OF TOTAL TESTOSTERONE: CPT

## 2022-04-14 PROCEDURE — 82172 ASSAY OF APOLIPOPROTEIN: CPT

## 2022-04-14 PROCEDURE — 84402 ASSAY OF FREE TESTOSTERONE: CPT

## 2022-04-15 ENCOUNTER — HOSPITAL ENCOUNTER (OUTPATIENT)
Dept: RADIOLOGY | Facility: MEDICAL CENTER | Age: 67
End: 2022-04-15
Attending: INTERNAL MEDICINE
Payer: MEDICARE

## 2022-04-15 DIAGNOSIS — E78.2 MIXED HYPERLIPIDEMIA WITH APOLIPOPROTEIN E3 VARIANT: ICD-10-CM

## 2022-04-15 DIAGNOSIS — R93.1 AGATSTON CAC SCORE, >400: ICD-10-CM

## 2022-04-15 DIAGNOSIS — I10 ESSENTIAL HYPERTENSION: ICD-10-CM

## 2022-04-15 PROCEDURE — 93018 CV STRESS TEST I&R ONLY: CPT | Performed by: INTERNAL MEDICINE

## 2022-04-15 PROCEDURE — A9502 TC99M TETROFOSMIN: HCPCS

## 2022-04-15 PROCEDURE — 78452 HT MUSCLE IMAGE SPECT MULT: CPT | Mod: 26 | Performed by: INTERNAL MEDICINE

## 2022-04-16 LAB — APO B100 SERPL-MCNC: 62 MG/DL (ref 55–140)

## 2022-04-17 LAB
LPA SERPL-MCNC: 33 MG/DL
SHBG SERPL-SCNC: 73 NMOL/L (ref 19–76)
TESTOST FREE MFR SERPL: 1.2 % (ref 1.6–2.9)
TESTOST FREE SERPL-MCNC: 77 PG/ML (ref 47–244)
TESTOST SERPL-MCNC: 661 NG/DL (ref 300–720)

## 2022-05-03 ENCOUNTER — OFFICE VISIT (OUTPATIENT)
Dept: VASCULAR LAB | Facility: MEDICAL CENTER | Age: 67
End: 2022-05-03
Attending: INTERNAL MEDICINE
Payer: MEDICARE

## 2022-05-03 ENCOUNTER — APPOINTMENT (RX ONLY)
Dept: URBAN - METROPOLITAN AREA CLINIC 35 | Facility: CLINIC | Age: 67
Setting detail: DERMATOLOGY
End: 2022-05-03

## 2022-05-03 VITALS
HEART RATE: 55 BPM | DIASTOLIC BLOOD PRESSURE: 55 MMHG | WEIGHT: 166 LBS | SYSTOLIC BLOOD PRESSURE: 99 MMHG | BODY MASS INDEX: 25.16 KG/M2 | HEIGHT: 68 IN

## 2022-05-03 DIAGNOSIS — Z41.9 ENCOUNTER FOR PROCEDURE FOR PURPOSES OTHER THAN REMEDYING HEALTH STATE, UNSPECIFIED: ICD-10-CM

## 2022-05-03 DIAGNOSIS — E78.41 ELEVATED LP(A): ICD-10-CM

## 2022-05-03 DIAGNOSIS — R93.1 AGATSTON CAC SCORE, >400: ICD-10-CM

## 2022-05-03 DIAGNOSIS — E03.9 HYPOTHYROIDISM, UNSPECIFIED TYPE: ICD-10-CM

## 2022-05-03 DIAGNOSIS — E78.2 MIXED HYPERLIPIDEMIA WITH APOLIPOPROTEIN E3 VARIANT: ICD-10-CM

## 2022-05-03 DIAGNOSIS — I10 ESSENTIAL HYPERTENSION: ICD-10-CM

## 2022-05-03 PROCEDURE — 99212 OFFICE O/P EST SF 10 MIN: CPT

## 2022-05-03 PROCEDURE — ? SKINPEN

## 2022-05-03 PROCEDURE — 99205 OFFICE O/P NEW HI 60 MIN: CPT | Performed by: INTERNAL MEDICINE

## 2022-05-03 RX ORDER — ROSUVASTATIN CALCIUM 20 MG/1
20 TABLET, COATED ORAL EVERY EVENING
Qty: 90 TABLET | Refills: 3 | Status: SHIPPED | OUTPATIENT
Start: 2022-05-03 | End: 2023-03-02

## 2022-05-03 RX ORDER — BENAZEPRIL HYDROCHLORIDE 5 MG/1
TABLET ORAL
Qty: 90 TABLET | Refills: 3 | Status: SHIPPED | OUTPATIENT
Start: 2022-05-03 | End: 2023-04-12 | Stop reason: DRUGHIGH

## 2022-05-03 ASSESSMENT — LOCATION ZONE DERM: LOCATION ZONE: FACE

## 2022-05-03 ASSESSMENT — LOCATION DETAILED DESCRIPTION DERM: LOCATION DETAILED: LEFT CENTRAL MALAR CHEEK

## 2022-05-03 ASSESSMENT — FIBROSIS 4 INDEX: FIB4 SCORE: 1.37

## 2022-05-03 ASSESSMENT — LOCATION SIMPLE DESCRIPTION DERM: LOCATION SIMPLE: LEFT CHEEK

## 2022-05-03 NOTE — PROGRESS NOTES
[facility] - Initial Visit  Date of Service: 05/03/22    Adal Dobbins has been referred for evaluation and management of dyslipidemia    Referral Source: pcp    Subjective    HPI  History of ASCVD: No  Other Established (non-atherosclerotic) Vascular Disease, if Present: None  Age at Initial Diagnosis of Dyslipidemia: 50s    Current Prescription Lipid Lowering Medications - including dose:   Statin: Rosuvastatin 10 mg  Non-Statin: None  Current Lipid Lowering and Related Supplements:   None  Any Current Side Effects Potentially Related to Lipid Lowering therapy?   No  Current Adherence to Lipid Lowering Therapies   Complete  Previously Attempted Interventions for Lipids - including outcome  Statin: None    Outcome: N/A  Non-Statin: None   Outcome: N/A  Any Previous History of Statin Intolerance?   No  Baseline Lipids Prior to Treatment:   Unknown or unavailable - only on treatment blood work available  Other Pertinent History:   Excellent exercise tolerance  No angina  Recently increased dose of levothyroxine  History of other CV risk factors:   Has h/o htn that has become more easy to control with improved TLC  Currently on low dose aceI  History of cigarette and cigar smoking - none recently  No fam history of premature ASCVD    PAST MEDICAL HISTORY:  has a past medical history of ASTHMA, Family history of hypertension, History of stroke, HTN (hypertension) (2011), Hyperlipidemia, Hypothyroid (7/1/2016), Psoriasis, and Stroke (HCC) (1997).    PAST SURGICAL HISTORY:  has a past surgical history that includes other (age 12 ).    CURRENT MEDICATIONS:   Current Outpatient Medications:   •  rosuvastatin, 10 mg, Oral, DAILY, Taking  •  calcipotriene, APPLY 2 GRAMS EXTERNALLY TO THE AFFECTED AREA TWICE DAILY, Taking  •  Synthroid, 112 mcg, Oral, AM ES, Taking  •  tadalafil, TAKE 1 TABLET BY MOUTH AS NEEDED FOR ERECTILE DYSFUNCTION, Taking  •  benazepril, TAKE 1 TABLET BY MOUTH EVERY DAY, Taking  •   "albuterol, 2 Puff, Inhalation, Q6HRS PRN, Taking  •  fluticasone, 1 Puff, Inhalation, BID, Taking  •  B Complex, 1 Capsule, Oral, QDAY, Taking  •  Vitamin D, 1 Each, Oral, DAILY, Taking  •  aspirin EC, 81 mg, Oral, DAILY, Taking  •  meloxicam, 7.5 mg, Oral, AFTER BREAKFAST    ALLERGIES: Patient has no known allergies.    FAMILY HISTORY: High cholesterol and high bp but no ascvd in first degree relatives on mothers side. Does not know fathers histoyr    SOCIAL HISTORY   Social History     Tobacco Use   Smoking Status Light Tobacco Smoker   • Types: Cigars   Smokeless Tobacco Never Used   Tobacco Comment    1 cigar per week     Change in weight: down a few pounds  Exercise habits: strenuous regular exercise program  Diet: essentially plant based with limited processed foods        Objective      Vitals:    05/03/22 1334   BP: (!) 99/55   BP Location: Left arm   Patient Position: Sitting   BP Cuff Size: Adult   Pulse: (!) 55   Weight: 75.3 kg (166 lb)   Height: 1.727 m (5' 8\")      Physical Exam  Vitals reviewed.   Constitutional:       General: He is not in acute distress.     Appearance: He is well-developed. He is not diaphoretic.   HENT:      Head: Normocephalic and atraumatic.   Eyes:      General: No scleral icterus.     Extraocular Movements: Extraocular movements intact.      Conjunctiva/sclera: Conjunctivae normal.   Neck:      Thyroid: No thyromegaly.      Vascular: No carotid bruit or JVD.   Cardiovascular:      Rate and Rhythm: Normal rate and regular rhythm.      Pulses: Normal pulses.      Heart sounds: Normal heart sounds. No murmur heard.  Pulmonary:      Effort: No respiratory distress.      Breath sounds: Normal breath sounds. No wheezing or rales.   Musculoskeletal:      Right lower leg: No edema.      Left lower leg: No edema.   Skin:     Coloration: Skin is not pale.   Neurological:      General: No focal deficit present.      Mental Status: He is alert and oriented to person, place, and time.      " Cranial Nerves: No cranial nerve deficit.      Coordination: Coordination normal.      Gait: Gait normal.   Psychiatric:         Mood and Affect: Mood normal.         Behavior: Behavior normal.         DATA REVIEW:  Most Recent Lipid Panel:   Lab Results   Component Value Date    CHOLSTRLTOT 195 03/01/2022    CHOLSTRLTOT 203 (H) 08/05/2021    TRIGLYCERIDE 90 03/01/2022    TRIGLYCERIDE 96 08/05/2021    HDL 70 (H) 03/01/2022    HDL 78 08/05/2021     (H) 08/05/2021   LDL C 107  LDLP 1220  apoB 62  Lp(a) 33    Other Pertinent Blood Work:   Lab Results   Component Value Date    SODIUM 141 03/01/2022    POTASSIUM 4.6 03/01/2022    CHLORIDE 107 03/01/2022    CO2 23 03/01/2022    ANION 11.0 03/01/2022    GLUCOSE 85 03/01/2022    BUN 19 03/01/2022    CREATININE 0.88 03/01/2022    CALCIUM 9.4 03/01/2022    ASTSGOT 28 03/01/2022    ALTSGPT 27 03/01/2022    ALKPHOSPHAT 41 03/01/2022    TBILIRUBIN 0.8 03/01/2022    ALBUMIN 4.4 03/01/2022    AGRATIO 1.7 03/01/2022    LIPOPROTA 33 (H) 04/14/2022    TSHULTRASEN 4.630 03/01/2022    CPKTOTAL 265 (H) 03/01/2022       CAC 2014  LMA - 0.0   LCX - 0.0   LAD - 102.8   RCA - 0.0   PDA - 30.5  Calcium Score:  133.3     vasc screen and cimt 2017  Mild carotid plaque  No aaa  Normal erin  Mean cimt 0.719  vas age 62    Stress Echo 2018  Negative stress echocardiogram for ischemia with adequate stress achieved by   heart rate criteria.     vasc screen and cimt 2018  Mild carotid plaque  No aaa  Normal erin  Mean cimt 0.719  vas age 63    CAC score 2022  LMA - 0.0  LCX - 0.0  LAD - 469.2  RCA - 117.9  PDA - 587.1  Total Calcium Score: 587.1    vasc screen and cimt 2022  Mild carotid plaque  No aaa  Normal erin  Mean cimt 0.758  [facility] age 67    MPI april 2022   No evidence of significant jeopardized viable myocardium or prior myocardial    infarction.   Normal left ventricular size, ejection fraction, and wall motion.        ASSESSMENT AND PLAN  Patient Type, check all that apply:   Primary  Prevention  Established Atherosclerotic Cardiovascular Disease (ASCVD)  Subclinical ASCVD as evidenced by his elevated coronary calcium score, elevated C IMT, and mild carotid atheroma seen on vascular screen.  As we discussed at length, coronary calcium scores are not meant to be followed serially and his worsening in coronary calcium score between 2014 and 2022 is as likely to be the result of calcification and stabilization of pre-existing plaque rather than progression of atherosclerosis.  CMT and vascular screen are a much better way to follow progression and both of them show reasonable stabilization and relatively low overall atheroma burden  Overall, I think picture is most consistent with the development of early atherosclerosis in his 40s and 50s with stabilization of existing plaque with better lifestyle and lipid-lowering therapy over the last 5 to 10 years  There is no evidence of ischemia on MPI and by symptoms so I do not think there is any reason to proceed with coronary CTA unless he develops symptoms.  This is an indication for aggressive medical management and we can follow his progression with C IMT and vascular screen serially.  I would not recommend he gets another coronary artery calcium score  Other Established (non-atherosclerotic) Vascular Disease, if Present:  None  Evidence of Heterozygous Familial Hypercholesterolemia (FH):   No (If yes, enter details of how diagnosis was made  ACC/AHA Indication for Statin Therapy, jose all that apply:  Baseline Calculated ASCVD risk >7.5%: Indication for Moderate intensity statin  Calculated Risk for ASCVD, if applicable    9.7 % - likely an underestimation given subclinical ascvd  Other Significant Risk Markers, if any, jose all that apply   Subclinical atherosclerosis on CAC scoring  Subclinical atherosclerosis on vascular screen and C IMT  Mildly elevated lipoprotein a  Favorable triglycerides, HDL, and LDL particle size  Treatment goals based on  shared decision making  Using shared decision making we decided to treat to aggressively and target an LDL C less than 55, APO B less than 55, and LDL particle number at least less than 900  Lifestyle Recommendations From Today’s Visit:    Continue regular exercise program  Continue essentially plant-based diet with limited processed foods  Statin Therapy Recommendations from Today’s Visit:   Increase rosuvastatin to 20 mg daily  Non-Statin Medications Recommendations from Today’s Visit:   Consider ezetimibe depending on achieved LDL  Indication for PCSK9 Inhibitor, if applicable:  Not currently indicated  Supplements Recommended at this visit:   Continue vitamin D which may help with statin tolerability  Recommendations for Other Cardiovascular Risk Factors, jose all that apply:   -Hypertension -under excellent control both in the office and at home.  Continue low-dose ACE inhibitor  -History of smoking -recommend continued avoidance of all tobacco products  Other Issues:  - hypothyroidism -asymptomatic.  Recently increased dose of levothyroxine.  Check TFTs with next blood work, but otherwise will defer to pcp    Future Studies: Repeat cimt and vascular screen april 2023  Blood Work Ordered At Today’s visit: NMR, apoB, lp(a), ck, crp, cmp, TFTs  Follow-Up: 2 months    Total time: 60-74min - chart review/prep, review of other providers' records, imaging/lab review, face-to-face time for history/examination, ordering, prescribing,  review of results/meds/ treatment plan with patient/family/caregiver, documentation in EMR, care coordination (as needed)    Michael J Bloch, M.D.    CC:  Toby Minor M.D.

## 2022-05-03 NOTE — PROCEDURE: SKINPEN
Treatment Number (Optional): 0
Location #4: forehead
Location #2: chin
Location #3: nose
Depth In Mm: 0.25
Consent: Written consent obtained, risks reviewed including but not limited to pain, scarring, infection
Location #1: neck and cheeks
Depth In Mm: 0.5
Detail Level: Zone
Post-Care Instructions: After the procedure, take precautions agains sun exposure. Do not apply sunscreen for 12 hours after the procedure. Do not apply make-up for 12 hours after the procedure. Avoid alcohol based toners for 10-14 days. After 2-3 days patients can return to normal skin care regimen. Pt will return in one month for additional treatment. Anteage serum applied to skin durning treatment
Price (Use Numbers Only, No Special Characters Or $): 500.00
Depth In Mm: 1.75

## 2022-05-05 ASSESSMENT — ENCOUNTER SYMPTOMS: SLEEP DISTURBANCE: 0

## 2022-05-06 ENCOUNTER — APPOINTMENT (OUTPATIENT)
Dept: SLEEP MEDICINE | Facility: MEDICAL CENTER | Age: 67
End: 2022-05-06
Payer: MEDICARE

## 2022-05-10 ENCOUNTER — HOSPITAL ENCOUNTER (OUTPATIENT)
Dept: RADIOLOGY | Facility: MEDICAL CENTER | Age: 67
End: 2022-05-10
Attending: INTERNAL MEDICINE
Payer: MEDICARE

## 2022-05-10 DIAGNOSIS — R97.20 ELEVATED PSA: ICD-10-CM

## 2022-05-10 PROCEDURE — A9576 INJ PROHANCE MULTIPACK: HCPCS | Performed by: INTERNAL MEDICINE

## 2022-05-10 PROCEDURE — 700111 HCHG RX REV CODE 636 W/ 250 OVERRIDE (IP): Mod: JG | Performed by: RADIOLOGY

## 2022-05-10 PROCEDURE — 72197 MRI PELVIS W/O & W/DYE: CPT | Mod: MG

## 2022-05-10 PROCEDURE — 700117 HCHG RX CONTRAST REV CODE 255: Performed by: INTERNAL MEDICINE

## 2022-05-10 RX ADMIN — GLUCAGON 1 MG: 1 INJECTION, POWDER, LYOPHILIZED, FOR SOLUTION INTRAMUSCULAR; INTRAVENOUS at 08:15

## 2022-05-10 RX ADMIN — GADOTERIDOL 20 ML: 279.3 INJECTION, SOLUTION INTRAVENOUS at 09:01

## 2022-05-18 DIAGNOSIS — R97.20 ELEVATED PSA: ICD-10-CM

## 2022-05-23 ENCOUNTER — TELEPHONE (OUTPATIENT)
Dept: SLEEP MEDICINE | Facility: MEDICAL CENTER | Age: 67
End: 2022-05-23

## 2022-05-23 ENCOUNTER — TELEMEDICINE (OUTPATIENT)
Dept: SLEEP MEDICINE | Facility: MEDICAL CENTER | Age: 67
End: 2022-05-23
Payer: MEDICARE

## 2022-05-23 VITALS — BODY MASS INDEX: 25.01 KG/M2 | WEIGHT: 165 LBS | HEIGHT: 68 IN

## 2022-05-23 DIAGNOSIS — G47.33 OSA (OBSTRUCTIVE SLEEP APNEA): ICD-10-CM

## 2022-05-23 PROCEDURE — 99204 OFFICE O/P NEW MOD 45 MIN: CPT | Mod: 95 | Performed by: FAMILY MEDICINE

## 2022-05-23 ASSESSMENT — PATIENT HEALTH QUESTIONNAIRE - PHQ9: CLINICAL INTERPRETATION OF PHQ2 SCORE: 0

## 2022-05-23 ASSESSMENT — FIBROSIS 4 INDEX: FIB4 SCORE: 1.37

## 2022-05-23 NOTE — PROGRESS NOTES
"Telemedicine Visit: New Patient     This encounter was conducted via Zoom using secure and encrypted videoconferencing technology. The patient's identity was confirmed and verbal consent was obtained for this virtual visit. Given the importance of social distancing and other strategies recommended to reduce the risk of COVID-19 transmission, I am providing medical care to this patient via audio/video visit in place of an in person visit at the request of the patient. However due to the technical difficulties, it was switched to phone visit.        [facility]  Consult Note     Date: 5/23/2022 / Time: 8:57 AM    Patient ID:   Name:             Adal Dobbins   YOB: 1955  Age:                 67 y.o.  male   MRN:               [id number]      Thank you for requesting a sleep medicine consultation on Adal Dobbins at the sleep center. He presents today with the chief complaints of snoring and pause in breathing during sleep.He is referred by Dr. Minor for evaluation and treatment of sleep disorder breathing.     HISTORY OF PRESENT ILLNESS:       At night,  Adal Dobbins goes to bed around 9-10 pm on weekdays and on the weekends. He gets out of bed at 6-7 am on weekdays and at 8 am on the weekends. He averages about 9 hrs of sleep on a good night and 6 hrs on a bad night. He falls asleep within 30-40 minutes. He wakes up about 2-3 times during the night due to bathroom use and on average It takes him few min to fall back asleep.     He is aware of snoring and breathing pauses in sleep.  He  denies any symptoms of restless legs syndrome such as an \"urge to move\"  He  legs in the evening or bedtime. He  denies any symptoms of narcolepsy such as sleep paralysis or cataplexy, or any symptoms to suggest parasomnias such as sleep walking or acting out of dreams. He  has not used any medications for the sleep problem.Overall, he does finds his sleep refreshing. In terms of  excessive daytime " sleepiness,he denies of sleepiness while reading or watching TV or while driving. He occasionally takes a nap. The naps are usually 30 min long.He drinks about 2 caffeinated beverages per day.    He had an OPO on 12/18/18. The total analyzed time was 8 hrs 20 min. O2 Sat. yoel was 74% and mean O2 sat was 91 % and baseline O2 at 97%. O2 sat was below 88% for 52 min of the flow evaluation time.      REVIEW OF SYSTEMS:       Constitutional: Denies fevers, Denies weight changes  Eyes: Denies changes in vision, no eye pain  Ears/Nose/Throat/Mouth: Denies nasal congestion or sore throat   Cardiovascular: Denies chest pain or palpitations   Respiratory: Denies shortness of breath , Denies cough  Gastrointestinal/Hepatic: Denies abdominal pain, nausea, vomiting  Musculoskeletal/Rheum: Denies  joint pain and swelling   Skin/Breast: Denies rash  Neurological: Denies headache, confusion  Psychiatric: denies mood disorder     Comprehensive review of systems form is reviewed with the patient and is attached in the EMR.     PMH:  has a past medical history of ASTHMA, Chickenpox, Family history of hypertension, History of stroke, HTN (hypertension) (2011), Hyperlipidemia, Hypothyroid (07/01/2016), Psoriasis, and Stroke (HCC) (1997).  MEDS:   Current Outpatient Medications:   •  benazepril (LOTENSIN) 5 MG Tab, TAKE 1 TABLET BY MOUTH EVERY DAY, Disp: 90 Tablet, Rfl: 3  •  rosuvastatin (CRESTOR) 20 MG Tab, Take 1 Tablet by mouth every evening., Disp: 90 Tablet, Rfl: 3  •  SYNTHROID 112 MCG Tab, Take 1 Tablet by mouth every morning on an empty stomach., Disp: 90 Tablet, Rfl: 3  •  tadalafil (CIALIS) 20 MG tablet, TAKE 1 TABLET BY MOUTH AS NEEDED FOR ERECTILE DYSFUNCTION, Disp: 10 Tablet, Rfl: 5  •  albuterol 108 (90 Base) MCG/ACT Aero Soln inhalation aerosol, Inhale 2 Puffs by mouth every 6 hours as needed., Disp: 8.5 g, Rfl: 5  •  fluticasone (FLOVENT HFA) 110 MCG/ACT Aerosol, Inhale 1 Puff by mouth 2 Times a Day., Disp: 1 Inhaler,  "Rfl: 3  •  B Complex Cap, Take 1 Cap by mouth every day., Disp: 100 Cap, Rfl: 3  •  Cholecalciferol (VITAMIN D) 2000 UNITS Cap, Take 1 Cap by mouth every day., Disp: , Rfl:   •  aspirin EC (ECOTRIN) 81 MG Tablet Delayed Response, Take 81 mg by mouth every day., Disp: , Rfl:   •  calcipotriene (DOVONEX) 0.005 % Cream, APPLY 2 GRAMS EXTERNALLY TO THE AFFECTED AREA TWICE DAILY, Disp: 120 g, Rfl: 6  ALLERGIES: No Known Allergies  SURGHX:   Past Surgical History:   Procedure Laterality Date   • OTHER  age 12     nasal polyps     SOCHX:  reports that he has been smoking cigars. He has never used smokeless tobacco. He reports current alcohol use of about 8.4 oz of alcohol per week. He reports that he does not use drugs..   FH:   Family History   Problem Relation Age of Onset   • Hypertension Mother    • Other Father 45        Viral encephalitis   • Sleep Apnea Neg Hx        Physical Exam:  Vitals/ General Appearance:   Weight/BMI: Body mass index is 25.09 kg/m².  Ht 1.727 m (5' 8\")   Wt 74.8 kg (165 lb)   Vitals:    05/23/22 0837   Weight: 74.8 kg (165 lb)   Height: 1.727 m (5' 8\")           Constitutional: Alert, no distress, well-groomed.  Skin: No rashes in visible areas.  Eye: Round. Conjunctiva clear, lids normal. No icterus.   ENMT: Lips pink without lesions, good dentition, moist mucous membranes. Phonation normal.  Neck: No masses, no thyromegaly. Moves freely without pain.  CV: Pulse as reported by patient  Respiratory: Unlabored respiratory effort, no cough or audible wheeze  Psych: Alert and oriented x3, normal affect and mood.   INVESTIGATIONS:       ASSESSMENT AND PLAN     1. He  has symptoms of Obstructive Sleep Apnea (GONZALEZ).The pathophysiology of GONZALEZ and the increased risk of cardiovascular morbidity from untreated GONZALEZ is discussed in detail with the patient.We have discussed diagnostic options including in-laboratory, attended polysomnography and home sleep testing. We have also discussed treatment options " including airway pressurization, reconstructive otolaryngologic surgery, dental appliances and weight management.       Subsequently,treatment options will be discussed based on the diagnostic study. Meanwhile, He is urged to avoid supine sleep, weight gain and alcoholic beverages since all of these can worsen GONZALEZ. He is cautioned against drowsy driving. If He feels sleepy while driving, He must pull over for a break/nap, rather than persist on the road, in the interest of He own safety and that of others on the road.    Plan  -  He  will be scheduled for an overnight PSG to assess sleep related breathing disorder.    2. Regarding treatment of other past medical problems and general health maintenance,  He is urged to follow up with PCP.

## 2022-05-23 NOTE — TELEPHONE ENCOUNTER
MICHELLEM for the pt wanting to schedule an in lab SS and FV apt that was order per Dr. Ford. I advise the pt to give us a call back to schedule the apt.

## 2022-06-01 ENCOUNTER — SLEEP STUDY (OUTPATIENT)
Dept: SLEEP MEDICINE | Facility: MEDICAL CENTER | Age: 67
End: 2022-06-01
Attending: FAMILY MEDICINE
Payer: MEDICARE

## 2022-06-01 DIAGNOSIS — G47.33 OSA (OBSTRUCTIVE SLEEP APNEA): ICD-10-CM

## 2022-06-01 PROCEDURE — 95810 POLYSOM 6/> YRS 4/> PARAM: CPT | Performed by: FAMILY MEDICINE

## 2022-06-02 NOTE — PROCEDURES
MONTAGE: Standard  STUDY TYPE: Diagnostic  RECORDING TECHNIQUE:   After the scalp was prepared, gold plated electrodes were applied to the scalp according to the International 10-20 System. EEG (electroencephalogram) was continuously monitored from the O1-M2, O2-M1, C3-M2, C4-M1, F3-M2, and F4-M1. EOGs (electrooculograms) were monitored by electrodes placed at the left and right outer canthi. Chin EMG (electromyogram) was monitored by electrodes placed on the mentalis and sub-mentalis muscles. Nasal and oral airflow were monitored using a triple port thermocouple as well as oronasal pressure transducer. Respiratory effort was measured by inductive plethysmography technology employing abdominal and thoracic belts. Blood oxygen saturation and pulse were monitored by pulse oximetry. Heart rhythm was monitored by surface electrocardiogram. Leg EMG was studied using surface electrodes placed on left and right anterior tibialis. A microphone was used to monitor tracheal sounds and snoring. Body position was monitored and documented by technician observation.   SCORING CRITERIA:   A modification of the AASM manual for scoring of sleep and associated events was used. Obstructive apneas were scored by cessation of airflow for at least 10 seconds with continuing respiratory effort. Central apneas were scored by cessation of airflow for at least 10 seconds with no respiratory effort. Hypopneas were scored by a 30% or more reduction in airflow for at least 10 seconds accompanied by arterial oxygen desaturation of 3% or an arousal. For CMS (Medicare) patients, per AASM rule 1B, hypopneas are scored by 30% with mild reduction in airflow for at least 10 seconds accompanied by arterial saturation decreased at 4%.  Study start time was 10:23:13 PM. Diagnostic recording time was 8h 7.0m with a total sleep time of 6h 12.5m resulting in a sleep efficiency of 76.49%%. Sleep latency from the start of the study was 08 minutes and the  latency from sleep to REM was 115 minutes. In total,53 arousals were scored for an arousal index of 8.5.  Respiratory:  There were a total of 0 apneas consisting of 0 obstructive apneas, 0 mixed apneas, and 0 central apneas. A total of 57 hypopneas were scored. The apnea index was 0.00 per hour and the hypopnea index was 9.18 per hour resulting in an overall AHI of 9.18. AHI during rem was 22.5 and AHI while supine was 10.56.  Oximetry:  There was a mean oxygen saturation of 91.0%. The minimum oxygen saturation in NREM was 84.0 % and in REM was 82.0. The patient spent 12.8 minutes of TST with SaO2 <88%.  Cardiac:  The highest heart rate seen while awake was 73 BPM while the highest heart rate during sleep was 66 BPM with an average sleeping heart rate of 52 BPM.  Limb Movements:  There were a total of 313 PLMs during sleep which resulted in a PLMS index of 50.4. Of these, 47 were associated with arousals which resulted in a PLMS arousal index of 7.6.    Impression:  1.  Mild OAS with AHI of 9.2/hr and O2 yoel 82 %      Recommendations:  I recommend that the patient should return for a CPAP/BiPAP titration due to the severity of sleep disordered breathing.  In some cases alternative treatment options may be proven effective in resolving sleep apnea. These options include upper airway surgery, the use of a dental orthotic, weight loss orpositional therapy. Clinical correlation is required. In general patients with sleep apnea are advised to avoid alcohol, sedatives and not to operate a motor vehicle while drowsy.  Untreated sleep apnea increases the risk for cardiovascular and neurovascular disease.

## 2022-06-07 ENCOUNTER — APPOINTMENT (RX ONLY)
Dept: URBAN - METROPOLITAN AREA CLINIC 35 | Facility: CLINIC | Age: 67
Setting detail: DERMATOLOGY
End: 2022-06-07

## 2022-06-07 DIAGNOSIS — Z41.9 ENCOUNTER FOR PROCEDURE FOR PURPOSES OTHER THAN REMEDYING HEALTH STATE, UNSPECIFIED: ICD-10-CM

## 2022-06-07 PROCEDURE — ? SKINPEN

## 2022-06-07 NOTE — PROCEDURE: SKINPEN
Consent: Written consent obtained, risks reviewed including but not limited to pain, scarring, infection
Location #2: chin
Topical Anesthesia?: 20% benzocaine, 8% lidocaine, 4% tetracaine
Detail Level: Zone
Treatment Number (Optional): 0
Depth In Mm: 0.25
Price (Use Numbers Only, No Special Characters Or $): 500.00
Location #3: nose
Location #4: forehead
Post-Care Instructions: After the procedure, take precautions agains sun exposure. Do not apply sunscreen for 12 hours after the procedure. Do not apply make-up for 12 hours after the procedure. Avoid alcohol based toners for 10-14 days. After 2-3 days patients can return to normal skin care regimen. Anteage serum applied to skin durning treatment
Depth In Mm: 1
Depth In Mm: 0.5
Location #1: neck and cheeks

## 2022-06-13 PROBLEM — G47.33 OBSTRUCTIVE SLEEP APNEA: Status: ACTIVE | Noted: 2022-06-13

## 2022-06-17 ENCOUNTER — NON-PROVIDER VISIT (OUTPATIENT)
Dept: INTERNAL MEDICINE | Facility: IMAGING CENTER | Age: 67
End: 2022-06-17
Payer: MEDICARE

## 2022-06-17 ENCOUNTER — HOSPITAL ENCOUNTER (OUTPATIENT)
Facility: MEDICAL CENTER | Age: 67
End: 2022-06-17
Attending: INTERNAL MEDICINE
Payer: MEDICARE

## 2022-06-17 DIAGNOSIS — E78.2 MIXED HYPERLIPIDEMIA WITH APOLIPOPROTEIN E3 VARIANT: ICD-10-CM

## 2022-06-17 DIAGNOSIS — Z01.89 ENCOUNTER FOR ROUTINE LABORATORY TESTING: ICD-10-CM

## 2022-06-17 DIAGNOSIS — E03.9 HYPOTHYROIDISM, UNSPECIFIED TYPE: ICD-10-CM

## 2022-06-17 LAB
ALBUMIN SERPL BCP-MCNC: 4.1 G/DL (ref 3.2–4.9)
ALBUMIN/GLOB SERPL: 1.6 G/DL
ALP SERPL-CCNC: 43 U/L (ref 30–99)
ALT SERPL-CCNC: 17 U/L (ref 2–50)
ANION GAP SERPL CALC-SCNC: 11 MMOL/L (ref 7–16)
AST SERPL-CCNC: 22 U/L (ref 12–45)
BILIRUB SERPL-MCNC: 0.7 MG/DL (ref 0.1–1.5)
BUN SERPL-MCNC: 13 MG/DL (ref 8–22)
CALCIUM SERPL-MCNC: 9.5 MG/DL (ref 8.5–10.5)
CHLORIDE SERPL-SCNC: 107 MMOL/L (ref 96–112)
CHOLEST SERPL-MCNC: 153 MG/DL (ref 100–199)
CO2 SERPL-SCNC: 24 MMOL/L (ref 20–33)
CREAT SERPL-MCNC: 0.82 MG/DL (ref 0.5–1.4)
CRP SERPL HS-MCNC: 0.2 MG/L (ref 0–3)
GFR SERPLBLD CREATININE-BSD FMLA CKD-EPI: 97 ML/MIN/1.73 M 2
GLOBULIN SER CALC-MCNC: 2.5 G/DL (ref 1.9–3.5)
GLUCOSE SERPL-MCNC: 88 MG/DL (ref 65–99)
HDLC SERPL-MCNC: 68 MG/DL
LDLC SERPL CALC-MCNC: 70 MG/DL
POTASSIUM SERPL-SCNC: 4.2 MMOL/L (ref 3.6–5.5)
PROT SERPL-MCNC: 6.6 G/DL (ref 6–8.2)
SODIUM SERPL-SCNC: 142 MMOL/L (ref 135–145)
T3 SERPL-MCNC: 75.4 NG/DL (ref 60–181)
T4 SERPL-MCNC: 5.7 UG/DL (ref 4–12)
TRIGL SERPL-MCNC: 77 MG/DL (ref 0–149)
TSH SERPL DL<=0.005 MIU/L-ACNC: 2.74 UIU/ML (ref 0.38–5.33)

## 2022-06-17 PROCEDURE — 84436 ASSAY OF TOTAL THYROXINE: CPT

## 2022-06-17 PROCEDURE — 86141 C-REACTIVE PROTEIN HS: CPT

## 2022-06-17 PROCEDURE — 84480 ASSAY TRIIODOTHYRONINE (T3): CPT

## 2022-06-17 PROCEDURE — 80053 COMPREHEN METABOLIC PANEL: CPT

## 2022-06-17 PROCEDURE — 84482 T3 REVERSE: CPT

## 2022-06-17 PROCEDURE — 80061 LIPID PANEL: CPT

## 2022-06-17 PROCEDURE — 82172 ASSAY OF APOLIPOPROTEIN: CPT

## 2022-06-17 PROCEDURE — 83704 LIPOPROTEIN BLD QUAN PART: CPT

## 2022-06-17 PROCEDURE — 84443 ASSAY THYROID STIM HORMONE: CPT

## 2022-06-20 LAB — APO B100 SERPL-MCNC: 61 MG/DL (ref 55–140)

## 2022-06-23 LAB
CHOLEST SERPL-MCNC: 161 MG/DL
HDL PARTICAL NO Q4363: 37 UMOL/L
HDL SIZE Q4361: 9.4 NM
HDLC SERPL-MCNC: 74 MG/DL (ref 40–59)
HLD.LARGE SERPL-SCNC: 10 UMOL/L
L VLDL PART NO Q4357: <1.5 NMOL/L
LDL SERPL QN: 21 NM
LDL SERPL-SCNC: 876 NMOL/L
LDL SMALL SERPL-SCNC: 166 NMOL/L
LDLC SERPL CALC-MCNC: 69 MG/DL
PATHOLOGY STUDY: ABNORMAL
T3REVERSE SERPL-MCNC: 13.1 NG/DL (ref 9–27)
TRIGL SERPL-MCNC: 92 MG/DL (ref 30–149)
VLDL SIZE Q4362: 41.6 NM

## 2022-06-24 ENCOUNTER — OFFICE VISIT (OUTPATIENT)
Dept: SLEEP MEDICINE | Facility: MEDICAL CENTER | Age: 67
End: 2022-06-24
Payer: MEDICARE

## 2022-06-24 ENCOUNTER — APPOINTMENT (OUTPATIENT)
Dept: SLEEP MEDICINE | Facility: MEDICAL CENTER | Age: 67
End: 2022-06-24
Payer: MEDICARE

## 2022-06-24 VITALS
SYSTOLIC BLOOD PRESSURE: 106 MMHG | HEIGHT: 68 IN | RESPIRATION RATE: 14 BRPM | WEIGHT: 159 LBS | OXYGEN SATURATION: 97 % | BODY MASS INDEX: 24.1 KG/M2 | DIASTOLIC BLOOD PRESSURE: 70 MMHG | HEART RATE: 58 BPM

## 2022-06-24 DIAGNOSIS — G47.33 OSA (OBSTRUCTIVE SLEEP APNEA): Primary | ICD-10-CM

## 2022-06-24 PROCEDURE — 99213 OFFICE O/P EST LOW 20 MIN: CPT | Performed by: STUDENT IN AN ORGANIZED HEALTH CARE EDUCATION/TRAINING PROGRAM

## 2022-06-24 ASSESSMENT — FIBROSIS 4 INDEX: FIB4 SCORE: 1.35

## 2022-06-24 NOTE — PROGRESS NOTES
[facility] Follow-up Visit    CC: Follow-up to discuss sleep study results      HPI:  Adal Dobbins is a 67 y.o.male  with hypertension, hypothyroidism, hyperlipidemia, and mild obstructive sleep apnea.  Presents today to discuss recent sleep study results.    He reports neither sleep study went well.  States it was not the best night sleep given the instrumentation of the study but he was able to get some sleep.  His wife has reported that he does have snoring and pauses in breathing during sleep.  He states he has a hard time sleeping on his side given that there can be some shoulder discomfort and hip discomfort and he feels more comfortable sleeping on his back.      Patient Active Problem List    Diagnosis Date Noted   • Obstructive sleep apnea, mild (2022 polysomnography) 06/13/2022   • Agatston CAC score, >400 (April 2022 total 587.1) 04/05/2022   • BPH with obstruction/lower urinary tract symptoms, nocturia 02/05/2021   • Elevated Lp(a) 03/04/2019   • RBBB 11/16/2017   • Right Liver hemangiomas per Ultrasound 11/16/2017   • Mild Chronic inactive gastritis without bleeding (per 2017 EGD) 10/09/2017   • Colon polyp (per 2017 Colonoscopy, negative for Adenomatous change or malignancy) 10/09/2017   • Abdominal aortic atherosclerosis (HCC) 04/19/2017   • Atherosclerosis of both carotid arteries, mild (US done April 2022) 04/19/2017   • Agatston CAC score 100-199 02/27/2017   • Hypertension 02/27/2017   • Vitamin D deficiency 02/27/2017   • Hypothyroid 07/01/2016   • Erectile dysfunction 09/18/2014   • Elevated CPK 09/18/2014   • Mixed hyperlipidemia with apolipoprotein E3 variant 10/09/2013   • Asthma    • Psoriasis    • Family history of hypertension    • History of stroke from Vertebral Artery Dissection        Past Medical History:   Diagnosis Date   • ASTHMA     mild intermittent   • Chickenpox    • Family history of hypertension    • History of stroke    • HTN (hypertension) 2011   •  Hyperlipidemia    • Hypothyroid 07/01/2016   • Obstructive sleep apnea 6/13/2022   • Psoriasis    • Stroke (HCC) 1997    Vertebral artery disection        Past Surgical History:   Procedure Laterality Date   • OTHER  age 12     nasal polyps       Family History   Problem Relation Age of Onset   • Hypertension Mother    • Other Father 45        Viral encephalitis   • Sleep Apnea Neg Hx        Social History     Socioeconomic History   • Marital status:      Spouse name: Jada   • Number of children: 3   • Years of education: 24   • Highest education level: Not on file   Occupational History   • Occupation: Orthopedic Surgeon   Tobacco Use   • Smoking status: Light Tobacco Smoker     Types: Cigars   • Smokeless tobacco: Never Used   • Tobacco comment: 1 cigar per week   Vaping Use   • Vaping Use: Never used   Substance and Sexual Activity   • Alcohol use: Yes     Alcohol/week: 8.4 oz     Types: 14 Standard drinks or equivalent per week     Comment: 2-3 drinks a week   • Drug use: No   • Sexual activity: Yes     Partners: Female   Other Topics Concern   • Not on file   Social History Narrative   • Not on file     Social Determinants of Health     Financial Resource Strain: Not on file   Food Insecurity: Not on file   Transportation Needs: Not on file   Physical Activity: Not on file   Stress: Not on file   Social Connections: Not on file   Intimate Partner Violence: Not on file   Housing Stability: Not on file       Current Outpatient Medications   Medication Sig Dispense Refill   • benazepril (LOTENSIN) 5 MG Tab TAKE 1 TABLET BY MOUTH EVERY DAY 90 Tablet 3   • rosuvastatin (CRESTOR) 20 MG Tab Take 1 Tablet by mouth every evening. 90 Tablet 3   • calcipotriene (DOVONEX) 0.005 % Cream APPLY 2 GRAMS EXTERNALLY TO THE AFFECTED AREA TWICE DAILY 120 g 6   • SYNTHROID 112 MCG Tab Take 1 Tablet by mouth every morning on an empty stomach. 90 Tablet 3   • tadalafil (CIALIS) 20 MG tablet TAKE 1 TABLET BY MOUTH AS NEEDED  "FOR ERECTILE DYSFUNCTION 10 Tablet 5   • albuterol 108 (90 Base) MCG/ACT Aero Soln inhalation aerosol Inhale 2 Puffs by mouth every 6 hours as needed. 8.5 g 5   • fluticasone (FLOVENT HFA) 110 MCG/ACT Aerosol Inhale 1 Puff by mouth 2 Times a Day. 1 Inhaler 3   • B Complex Cap Take 1 Cap by mouth every day. 100 Cap 3   • Cholecalciferol (VITAMIN D) 2000 UNITS Cap Take 1 Cap by mouth every day.     • aspirin EC (ECOTRIN) 81 MG Tablet Delayed Response Take 81 mg by mouth every day.       No current facility-administered medications for this visit.        ALLERGIES: Patient has no known allergies.    ROS  Constitutional: Denies fevers, Denies weight changes  Ears/Nose/Throat/Mouth: Denies nasal congestion or sore throat   Cardiovascular: Denies chest pain  Respiratory: Denies shortness of breath, Denies cough  Gastrointestinal/Hepatic: Denies nausea, vomiting  Sleep: see HPI      PHYSICAL EXAM  /70 (BP Location: Left arm, Patient Position: Sitting, BP Cuff Size: Adult)   Pulse (!) 58   Resp 14   Ht 1.727 m (5' 8\")   Wt 72.1 kg (159 lb)   SpO2 97%   BMI 24.18 kg/m²   Appearance: Well-nourished, well-developed, no acute distress  Eyes:  No scleral icterus , EOMI  ENMT: masked  Musculoskeletal:  Grossly normal; gait and station normal; digits and nails normal  Skin:  No rashes, petechiae, cyanosis  Neurologic: without focal signs; oriented to person, time, place, and purpose; judgement intact      Medical Decision Making   Assessment and Plan  Adal Dobbins is a 67 y.o.male  with hypertension, hypothyroidism, hyperlipidemia, and mild obstructive sleep apnea.  Presents today to discuss recent sleep study results.      The medical record was reviewed.    Obstructive sleep apnea   Reviewed recent PSG with patient showing an AHI of 9.2, and Min Oxygen saturation of 82%.  Time at or below 88% saturation of 13 minutes  Based on sleep study and symptoms meets criteria for mild obstructive sleep apnea.   We " discussed the pathophysiology of obstructive sleep apnea (GONZALEZ) and risk factors for the disease. We also discussed possible consequences of untreated GONZALEZ, including excessive daytime sleepiness and fatigue, cognitive dysfunction, cardiovascular complications such as elevated blood pressure, heart attacks, cardiac arrhythmias, and strokes. We discussed how GONZALEZ typically gets worse with age. We discussed treatment options for GONZALEZ, including the gold standard therapy (PAP), alternative options such as a mandibular advancement device (custom-made oral appliances) and positional therapy. We will proceed CPAP therapy.     He may try positional therapy while he is awaiting his CPAP.    RECOMMENDATIONS  -Start Auto CPAP at pressures 4-7 cm H2O  -Discussed importance of adherence/compliance   -Prescription generated for supplies   -Patient counseled to avoid driving when sleepy. Encouraged to anticipate sleepiness, consider taking a 10 min nap prior to driving, alternate with another , or pull over if sleepy while driving  -Advised to contact our office or myself with any questions via My[facility]  -Follow up in 3 months or sooner if needed    Have advised the patient to follow up with the appropriate healthcare practitioners for all other medical problems and issues.    Return for 1-2 months after starting CPAP. .      Please note portions of this record was created using voice recognition software. I have made every reasonable attempt to correct obvious errors, but I expect that there are errors of grammar and possibly content I did not discover before finalizing the note.

## 2022-07-12 ENCOUNTER — PATIENT MESSAGE (OUTPATIENT)
Dept: INTERNAL MEDICINE | Facility: IMAGING CENTER | Age: 67
End: 2022-07-12
Payer: MEDICARE

## 2022-07-14 ENCOUNTER — OFFICE VISIT (OUTPATIENT)
Dept: VASCULAR LAB | Facility: MEDICAL CENTER | Age: 67
End: 2022-07-14
Attending: INTERNAL MEDICINE
Payer: MEDICARE

## 2022-07-14 VITALS
HEIGHT: 68 IN | BODY MASS INDEX: 24.55 KG/M2 | HEART RATE: 71 BPM | WEIGHT: 162 LBS | SYSTOLIC BLOOD PRESSURE: 105 MMHG | DIASTOLIC BLOOD PRESSURE: 67 MMHG

## 2022-07-14 DIAGNOSIS — R93.1 AGATSTON CAC SCORE, >400: ICD-10-CM

## 2022-07-14 DIAGNOSIS — E03.9 HYPOTHYROIDISM, UNSPECIFIED TYPE: ICD-10-CM

## 2022-07-14 DIAGNOSIS — E78.2 MIXED HYPERLIPIDEMIA WITH APOLIPOPROTEIN E3 VARIANT: ICD-10-CM

## 2022-07-14 DIAGNOSIS — E78.41 ELEVATED LP(A): ICD-10-CM

## 2022-07-14 PROCEDURE — 99212 OFFICE O/P EST SF 10 MIN: CPT

## 2022-07-14 PROCEDURE — 99214 OFFICE O/P EST MOD 30 MIN: CPT | Performed by: INTERNAL MEDICINE

## 2022-07-14 ASSESSMENT — FIBROSIS 4 INDEX: FIB4 SCORE: 1.35

## 2022-07-14 NOTE — PROGRESS NOTES
"[facility] - follow up  Visit  Date of Service: 07/14/22    Patient here for f/u of dyslipidemia    Subjective    HPI  History of ASCVD: No  Other Established (non-atherosclerotic) Vascular Disease, if Present: None  Age at Initial Diagnosis of Dyslipidemia: 50s    Current Prescription Lipid Lowering Medications - including dose:   Statin: Rosuvastatin 20 mg  Non-Statin: None  Current Lipid Lowering and Related Supplements:   Aspirin 81 mg a day  Vitamin D  Any Current Side Effects Potentially Related to Lipid Lowering therapy?   No  Current Adherence to Lipid Lowering Therapies   Complete  Previously Attempted Interventions for Lipids - including outcome  Statin: None    Outcome: N/A  Non-Statin: None   Outcome: N/A  Any Previous History of Statin Intolerance?   No  Baseline Lipids Prior to Treatment:   Unknown or unavailable - only on treatment blood work available  Other Pertinent History:   Excellent exercise tolerance  No angina  Remains on same dose levothyroxine  History of other CV risk factors:   He is down to 5 mg of benazepril a day  Office readings and well below 130/80  History of cigarette and cigar smoking - none recently  No fam history of premature ASCVD    FAMILY HISTORY: High cholesterol and high bp but no ascvd in first degree relatives on mothers side. Does not know fathers histoyr    SOCIAL HISTORY   Social History     Tobacco Use   Smoking Status Light Tobacco Smoker   • Types: Cigars   Smokeless Tobacco Never Used   Tobacco Comment    1 cigar per week     Change in weight: Has maintained his weight loss  Exercise habits: strenuous regular exercise program  Diet: essentially plant based with limited processed foods        Objective      Vitals:    07/14/22 1158   BP: 105/67   BP Location: Left arm   Patient Position: Sitting   BP Cuff Size: Adult   Pulse: 71   Weight: 73.5 kg (162 lb)   Height: 1.727 m (5' 8\")      Physical Exam  Constitutional:       General: He is not in acute " distress.     Appearance: He is well-developed. He is not diaphoretic.   HENT:      Head: Normocephalic and atraumatic.   Eyes:      General: No scleral icterus.     Extraocular Movements: Extraocular movements intact.      Conjunctiva/sclera: Conjunctivae normal.   Neck:      Thyroid: No thyromegaly.      Vascular: No JVD.   Pulmonary:      Effort: Pulmonary effort is normal. No respiratory distress.   Skin:     Coloration: Skin is not pale.   Neurological:      General: No focal deficit present.      Mental Status: He is alert and oriented to person, place, and time.      Cranial Nerves: No cranial nerve deficit.      Coordination: Coordination normal.      Gait: Gait normal.   Psychiatric:         Mood and Affect: Mood normal.         Behavior: Behavior normal.         DATA REVIEW:  Most Recent Lipid Panel:   Lab Results   Component Value Date    CHOLSTRLTOT 161 06/17/2022    CHOLSTRLTOT 153 06/17/2022    TRIGLYCERIDE 92 06/17/2022    TRIGLYCERIDE 77 06/17/2022    HDL 74 (H) 06/17/2022    HDL 68 06/17/2022    LDL 70 06/17/2022   LDL C 69-70  LDLP 876  apoB 61  Lp(a) 33    Other Pertinent Blood Work:   Lab Results   Component Value Date    SODIUM 142 06/17/2022    POTASSIUM 4.2 06/17/2022    CHLORIDE 107 06/17/2022    CO2 24 06/17/2022    ANION 11.0 06/17/2022    GLUCOSE 88 06/17/2022    BUN 13 06/17/2022    CREATININE 0.82 06/17/2022    CALCIUM 9.5 06/17/2022    ASTSGOT 22 06/17/2022    ALTSGPT 17 06/17/2022    ALKPHOSPHAT 43 06/17/2022    TBILIRUBIN 0.7 06/17/2022    ALBUMIN 4.1 06/17/2022    AGRATIO 1.6 06/17/2022    LIPOPROTA 33 (H) 04/14/2022    TSHULTRASEN 2.740 06/17/2022    CPKTOTAL 265 (H) 03/01/2022       CAC 2014  LMA - 0.0   LCX - 0.0   LAD - 102.8   RCA - 0.0   PDA - 30.5  Calcium Score:  133.3     vasc screen and cimt 2017  Mild carotid plaque  No aaa  Normal erin  Mean cimt 0.719  vasc age 62    Stress Echo 2018  Negative stress echocardiogram for ischemia with adequate stress achieved by   heart  rate criteria.     vasc screen and cimt 2018  Mild carotid plaque  No aaa  Normal erin  Mean cimt 0.719  vasc age 63    CAC score 2022  LMA - 0.0  LCX - 0.0  LAD - 469.2  RCA - 117.9  PDA - 587.1  Total Calcium Score: 587.1    vasc screen and cimt April 2022  Mild carotid plaque  No aaa  Normal erin  Mean cimt 0.758  [facility] age 67    MPI april 2022   No evidence of significant jeopardized viable myocardium or prior myocardial    infarction.   Normal left ventricular size, ejection fraction, and wall motion.        ASSESSMENT AND PLAN  Patient Type, check all that apply:   Primary Prevention  Established Atherosclerotic Cardiovascular Disease (ASCVD)  Subclinical ASCVD as evidenced by his elevated coronary calcium score, elevated C IMT, and mild carotid atheroma seen on vascular screen.  As we previously discussed at length, coronary calcium scores are not meant to be followed serially and his worsening in coronary calcium score between 2014 and 2022 is as likely to be the result of calcification and stabilization of pre-existing plaque rather than progression of atherosclerosis.  CMT and vascular screen are a much better way to follow progression and both of them show reasonable stabilization and relatively low overall atheroma burden  Overall, I think picture is most consistent with the development of early atherosclerosis in his 40s and 50s with stabilization of existing plaque with better lifestyle and lipid-lowering therapy over the last 5 to 10 years  There is no evidence of ischemia on MPI and by symptoms so I do not think there is any reason to proceed with coronary CTA unless he develops symptoms.  This is an indication for aggressive medical management and we can follow his progression with C IMT and vascular screen serially.  I would not recommend he gets another coronary artery calcium score  Other Established (non-atherosclerotic) Vascular Disease, if Present:  None  Evidence of Heterozygous Familial  Hypercholesterolemia (FH):   No   ACC/AHA Indication for Statin Therapy, jose all that apply:  Baseline Calculated ASCVD risk >7.5%: Indication for Moderate intensity statin  Calculated Risk for ASCVD, if applicable    9.7 % - likely an underestimation given subclinical ascvd  Other Significant Risk Markers, if any, jose all that apply   Subclinical atherosclerosis on CAC scoring  Subclinical atherosclerosis on vascular screen and C IMT  Mildly elevated lipoprotein a  Favorable triglycerides, HDL, and LDL particle size  Treatment goals based on shared decision making  Using shared decision making we decided to treat to aggressively and target an LDL C less than 70, APO B less than 60, and LDL particle number at least less than 900 -he is at very near all of these goals  Lifestyle Recommendations From Today’s Visit:    Continue regular exercise program  Continue essentially plant-based diet with limited processed foods  Statin Therapy Recommendations from Today’s Visit:   Continue rosuvastatin to 20 mg daily  Non-Statin Medications Recommendations from Today’s Visit:   None currently  Indication for PCSK9 Inhibitor, if applicable:  Not currently indicated  Supplements Recommended at this visit:   Continue vitamin D which may help with statin tolerability  Recommendations for Other Cardiovascular Risk Factors, jose all that apply:   -Hypertension -under excellent control both in the office and at home despite decreasing doses of benazepril.  Reasonable to go ahead and try stopping benazepril and remain off of it as long as his home blood pressures remain less than 130/80.  He will get a home monitor and begin keeping a log  -History of smoking -recommend continued avoidance of all tobacco products  -Antiplatelet therapy -reasonable to continue low-dose aspirin given his subclinical CAD and low bleeding risk  Other Issues:  - hypothyroidism -asymptomatic.  Seems under reasonable control per symptoms and most recent  TSH.  Continue current dose levothyroxine.  Otherwise follow-up with PCP    Future Studies: Repeat cimt and vascular screen april 2023  Blood Work Ordered At Today’s visit: NMR, apoB, crp, cmp, Tsh  Follow-Up: may 2023     Time: 30-39min - chart review/prep, review of other providers' records, imaging/lab review, face-to-face time for history/examination, ordering, prescribing,  review of results/meds/ treatment plan with patient/family/caregiver, documentation in EMR, care coordination (as needed)    Michael J Bloch, M.D.    CC:  Toby Minor M.D.

## 2022-07-15 DIAGNOSIS — E78.2 MIXED HYPERLIPIDEMIA WITH APOLIPOPROTEIN E3 VARIANT: ICD-10-CM

## 2022-07-15 DIAGNOSIS — R93.1 AGATSTON CAC SCORE, >400: ICD-10-CM

## 2022-07-15 DIAGNOSIS — I65.23 ATHEROSCLEROSIS OF BOTH CAROTID ARTERIES: ICD-10-CM

## 2022-07-19 ENCOUNTER — APPOINTMENT (RX ONLY)
Dept: URBAN - METROPOLITAN AREA CLINIC 35 | Facility: CLINIC | Age: 67
Setting detail: DERMATOLOGY
End: 2022-07-19

## 2022-07-19 DIAGNOSIS — Z41.9 ENCOUNTER FOR PROCEDURE FOR PURPOSES OTHER THAN REMEDYING HEALTH STATE, UNSPECIFIED: ICD-10-CM

## 2022-07-19 PROCEDURE — ? ADDITIONAL NOTES

## 2022-07-19 PROCEDURE — ? BOTOX

## 2022-07-19 NOTE — PROCEDURE: BOTOX
Right Periorbital Skin Units: 0
Consent: Verbal and written informed consent were obtained to include the following risks: pain, swelling, bruising, eyelid or eyebrow droop, and lack of visible improvement of wrinkles in the areas treated.  The skin was cleansed with alcohol. Injections were administered with a 32g needle into the following areas:
Additional Area 3 Units: 26
Reconstitution Date (Optional): 4/7/22
Dilution (U/0.1 Cc): 1.1
Additional Area 3 Location: Frontalis
Lot #: [id number]
Detail Level: Detailed
Additional Area 2 Units: 73
Additional Area 5 Location: perioral
Additional Area 1 Location: Glabella
Expiration Date (Month Year): 5/24
Additional Area 6 Location: platysma
Price (Use Numbers Only, No Special Characters Or $): 1500
Post-Care Instructions: Patient instructed to not lie down for 4 hours after injections and limit physical activity for 24 hours.
Additional Area 4 Location: Bunny lines
Additional Area 2 Location: Crows Feet

## 2022-07-26 ENCOUNTER — PATIENT MESSAGE (OUTPATIENT)
Dept: INTERNAL MEDICINE | Facility: IMAGING CENTER | Age: 67
End: 2022-07-26
Payer: MEDICARE

## 2022-07-26 DIAGNOSIS — N52.9 ERECTILE DYSFUNCTION, UNSPECIFIED ERECTILE DYSFUNCTION TYPE: ICD-10-CM

## 2022-08-04 RX ORDER — TADALAFIL 20 MG/1
20 TABLET ORAL
Qty: 20 TABLET | Refills: 3 | Status: SHIPPED | OUTPATIENT
Start: 2022-08-04 | End: 2023-11-13 | Stop reason: SDUPTHER

## 2022-08-09 ENCOUNTER — APPOINTMENT (RX ONLY)
Dept: URBAN - METROPOLITAN AREA CLINIC 35 | Facility: CLINIC | Age: 67
Setting detail: DERMATOLOGY
End: 2022-08-09

## 2022-08-09 DIAGNOSIS — Z41.9 ENCOUNTER FOR PROCEDURE FOR PURPOSES OTHER THAN REMEDYING HEALTH STATE, UNSPECIFIED: ICD-10-CM

## 2022-08-09 PROCEDURE — ? SKINPEN

## 2022-08-09 NOTE — PROCEDURE: SKINPEN
Location #3: nose
Depth In Mm: 0.25
Depth In Mm: 0.5
Treatment Number (Optional): 0
Location #1: neck and cheeks
Consent: Written consent obtained, risks reviewed including but not limited to pain, scarring, infection
Detail Level: Zone
Location #4: forehead
Depth In Mm: 1.5
Price (Use Numbers Only, No Special Characters Or $): 500.00
Location #2: chin
Post-Care Instructions: After the procedure, take precautions agains sun exposure. Do not apply sunscreen for 12 hours after the procedure. Do not apply make-up for 12 hours after the procedure. Avoid alcohol based toners for 10-14 days. After 2-3 days patients can return to normal skin care regimen. Anteage serum applied to skin durning treatment

## 2022-08-29 NOTE — PROCEDURE: SKINPEN
Location #1: cheeks
Retinal tear and detachment warning symptoms reviewed and patient instructed to call immediately if increasing floaters, flashes, or decreasing peripheral vision.
Depth In Mm: 1
Location #2: chin and neck
Depth In Mm: 0.25
Post-Care Instructions: After the procedure, take precautions agains sun exposure. Do not apply sunscreen for 12 hours after the procedure. Do not apply make-up for 12 hours after the procedure. Avoid alcohol based toners for 10-14 days. After 2-3 days patients can return to normal skin care regimen. Pt will return in one month for additional treatment. Anteage used on skin during treatment.
Treatment Number (Optional): 0
Location #4: forehead
Consent: Written consent obtained, risks reviewed including but not limited to pain, scarring, infection and incomplete improvement.
Location #3: nose
Depth In Mm: 0.5
Detail Level: Zone
Depth In Mm: 1.5
Price (Use Numbers Only, No Special Characters Or $): 500

## 2022-09-12 ENCOUNTER — APPOINTMENT (RX ONLY)
Dept: URBAN - METROPOLITAN AREA CLINIC 35 | Facility: CLINIC | Age: 67
Setting detail: DERMATOLOGY
End: 2022-09-12

## 2022-09-12 DIAGNOSIS — Z41.9 ENCOUNTER FOR PROCEDURE FOR PURPOSES OTHER THAN REMEDYING HEALTH STATE, UNSPECIFIED: ICD-10-CM

## 2022-09-12 PROCEDURE — ? SKINPEN

## 2022-09-12 NOTE — PROCEDURE: SKINPEN
Depth In Mm: 0.5
Location #1: neck and cheeks
Depth In Mm: 1
Depth In Mm: 0.25
Post-Care Instructions: After the procedure, take precautions agains sun exposure. Do not apply sunscreen for 12 hours after the procedure. Do not apply make-up for 12 hours after the procedure. Avoid alcohol based toners for 10-14 days. After 2-3 days patients can return to normal skin care regimen. Anteage serum applied to skin durning treatment
Location #4: forehead
Location #3: nose
Location #2: chin
Price (Use Numbers Only, No Special Characters Or $): 500.00
Consent: Written consent obtained, risks reviewed including but not limited to pain, scarring, infection
Treatment Number (Optional): 0
Detail Level: Zone

## 2022-09-21 ENCOUNTER — NON-PROVIDER VISIT (OUTPATIENT)
Dept: INTERNAL MEDICINE | Facility: IMAGING CENTER | Age: 67
End: 2022-09-21
Payer: MEDICARE

## 2022-09-21 ENCOUNTER — APPOINTMENT (RX ONLY)
Dept: URBAN - METROPOLITAN AREA CLINIC 35 | Facility: CLINIC | Age: 67
Setting detail: DERMATOLOGY
End: 2022-09-21

## 2022-09-21 ENCOUNTER — HOSPITAL ENCOUNTER (OUTPATIENT)
Facility: MEDICAL CENTER | Age: 67
End: 2022-09-21
Attending: INTERNAL MEDICINE
Payer: MEDICARE

## 2022-09-21 DIAGNOSIS — R74.8 ELEVATED CPK: ICD-10-CM

## 2022-09-21 DIAGNOSIS — I10 ESSENTIAL HYPERTENSION: ICD-10-CM

## 2022-09-21 DIAGNOSIS — Z01.89 ENCOUNTER FOR ROUTINE LABORATORY TESTING: ICD-10-CM

## 2022-09-21 DIAGNOSIS — E03.9 HYPOTHYROIDISM (ACQUIRED): ICD-10-CM

## 2022-09-21 DIAGNOSIS — Z41.9 ENCOUNTER FOR PROCEDURE FOR PURPOSES OTHER THAN REMEDYING HEALTH STATE, UNSPECIFIED: ICD-10-CM

## 2022-09-21 DIAGNOSIS — R97.20 ELEVATED PSA: ICD-10-CM

## 2022-09-21 DIAGNOSIS — E78.2 MIXED HYPERLIPIDEMIA WITH APOLIPOPROTEIN E3 VARIANT: ICD-10-CM

## 2022-09-21 LAB
ALBUMIN SERPL BCP-MCNC: 4.3 G/DL (ref 3.2–4.9)
ALBUMIN/GLOB SERPL: 1.7 G/DL
ALP SERPL-CCNC: 41 U/L (ref 30–99)
ALT SERPL-CCNC: 19 U/L (ref 2–50)
ANION GAP SERPL CALC-SCNC: 10 MMOL/L (ref 7–16)
AST SERPL-CCNC: 26 U/L (ref 12–45)
BILIRUB SERPL-MCNC: 0.6 MG/DL (ref 0.1–1.5)
BUN SERPL-MCNC: 25 MG/DL (ref 8–22)
CALCIUM SERPL-MCNC: 9.5 MG/DL (ref 8.5–10.5)
CHLORIDE SERPL-SCNC: 105 MMOL/L (ref 96–112)
CHOLEST SERPL-MCNC: 170 MG/DL (ref 100–199)
CK SERPL-CCNC: 262 U/L (ref 0–154)
CO2 SERPL-SCNC: 26 MMOL/L (ref 20–33)
CREAT SERPL-MCNC: 0.92 MG/DL (ref 0.5–1.4)
GFR SERPLBLD CREATININE-BSD FMLA CKD-EPI: 92 ML/MIN/1.73 M 2
GLOBULIN SER CALC-MCNC: 2.6 G/DL (ref 1.9–3.5)
GLUCOSE SERPL-MCNC: 81 MG/DL (ref 65–99)
HDLC SERPL-MCNC: 71 MG/DL
LDLC SERPL CALC-MCNC: 78 MG/DL
POTASSIUM SERPL-SCNC: 4.1 MMOL/L (ref 3.6–5.5)
PROT SERPL-MCNC: 6.9 G/DL (ref 6–8.2)
PSA SERPL-MCNC: 5.13 NG/ML (ref 0–4)
SODIUM SERPL-SCNC: 141 MMOL/L (ref 135–145)
T3 SERPL-MCNC: 74.9 NG/DL (ref 60–181)
TRIGL SERPL-MCNC: 105 MG/DL (ref 0–149)
TSH SERPL DL<=0.005 MIU/L-ACNC: 4.64 UIU/ML (ref 0.38–5.33)

## 2022-09-21 PROCEDURE — 80061 LIPID PANEL: CPT

## 2022-09-21 PROCEDURE — 84480 ASSAY TRIIODOTHYRONINE (T3): CPT

## 2022-09-21 PROCEDURE — 84153 ASSAY OF PSA TOTAL: CPT

## 2022-09-21 PROCEDURE — ? FRAXEL RESTORE DUAL

## 2022-09-21 PROCEDURE — 82550 ASSAY OF CK (CPK): CPT

## 2022-09-21 PROCEDURE — 80053 COMPREHEN METABOLIC PANEL: CPT

## 2022-09-21 PROCEDURE — 84443 ASSAY THYROID STIM HORMONE: CPT

## 2022-09-21 ASSESSMENT — LOCATION ZONE DERM: LOCATION ZONE: FACE

## 2022-09-21 ASSESSMENT — LOCATION DETAILED DESCRIPTION DERM: LOCATION DETAILED: LEFT INFERIOR MEDIAL FOREHEAD

## 2022-09-21 ASSESSMENT — LOCATION SIMPLE DESCRIPTION DERM: LOCATION SIMPLE: LEFT FOREHEAD

## 2022-09-21 NOTE — PROCEDURE: FRAXEL RESTORE DUAL
Topical Anesthesia?: 23% lidocaine, 7% tetracaine
Pre-Procedure Text: 1.5 hours numbing
Length Topical Anesthesia Applied (Optional): 60 minutes
Post-Care Instructions: I reviewed with the patient in detail post-care instructions.
Passes (Optional): 8
Actual Kj Used (Optional): 3.36
Treatment Number (Optional): 18
Eye Shielding Text (Leave Blank If Unwanted- Will Be Inserted If Selecting Eye Shields): The intraocular eye shields were placed. 2 drops of intraocular tetracaine HCL ophthalmic 0.5% solution was administered. The eye shields were coated with ophthalmic bacitracin prior to insertion. After the shields were removed the eyes were flushed with normal saline.
Consent: Written consent obtained, risks reviewed including but not limited to crusting, scabbing, blistering, scarring, darker or lighter pigmentary change, and/or incomplete removal.
Detail Level: Zone
Energy In Mj (Optional): 20
Price (Use Numbers Only, No Special Characters Or $): 0480

## 2022-09-23 ENCOUNTER — OFFICE VISIT (OUTPATIENT)
Dept: INTERNAL MEDICINE | Facility: IMAGING CENTER | Age: 67
End: 2022-09-23
Payer: MEDICARE

## 2022-09-23 VITALS
HEART RATE: 59 BPM | WEIGHT: 165 LBS | TEMPERATURE: 98.9 F | DIASTOLIC BLOOD PRESSURE: 62 MMHG | OXYGEN SATURATION: 99 % | BODY MASS INDEX: 25.01 KG/M2 | SYSTOLIC BLOOD PRESSURE: 128 MMHG | HEIGHT: 68 IN | RESPIRATION RATE: 17 BRPM

## 2022-09-23 DIAGNOSIS — Z86.39 HISTORY OF VITAMIN D DEFICIENCY: ICD-10-CM

## 2022-09-23 DIAGNOSIS — I10 ESSENTIAL HYPERTENSION: ICD-10-CM

## 2022-09-23 DIAGNOSIS — E03.9 HYPOTHYROIDISM (ACQUIRED): ICD-10-CM

## 2022-09-23 DIAGNOSIS — R74.8 ELEVATED CPK: ICD-10-CM

## 2022-09-23 DIAGNOSIS — E78.00 HYPERCHOLESTEROLEMIA: ICD-10-CM

## 2022-09-23 DIAGNOSIS — R97.20 ELEVATED PSA: ICD-10-CM

## 2022-09-23 DIAGNOSIS — Z86.39 HISTORY OF NON ANEMIC VITAMIN B12 DEFICIENCY: ICD-10-CM

## 2022-09-23 DIAGNOSIS — Z23 NEED FOR VACCINATION: ICD-10-CM

## 2022-09-23 PROCEDURE — G0009 ADMIN PNEUMOCOCCAL VACCINE: HCPCS | Performed by: INTERNAL MEDICINE

## 2022-09-23 PROCEDURE — 99214 OFFICE O/P EST MOD 30 MIN: CPT | Performed by: INTERNAL MEDICINE

## 2022-09-23 PROCEDURE — 90677 PCV20 VACCINE IM: CPT | Performed by: INTERNAL MEDICINE

## 2022-09-23 RX ORDER — LEVOTHYROXINE SODIUM 125 MCG
125 TABLET ORAL
Qty: 90 TABLET | Refills: 3 | Status: SHIPPED | OUTPATIENT
Start: 2022-09-23 | End: 2023-08-17 | Stop reason: SDUPTHER

## 2022-09-23 ASSESSMENT — FIBROSIS 4 INDEX: FIB4 SCORE: 1.51

## 2022-09-23 NOTE — PROGRESS NOTES
Established Patient Note   HPI:        Adal returns today to follow up HTN, hypothyroid and elevated PSA; he has done recent lab work. He had tried stopping benazepril when he retired but BP did increase to about 130/85 so he restarted.    Past Medical History:   Diagnosis Date    ASTHMA     mild intermittent    Chickenpox     Family history of hypertension     History of stroke     HTN (hypertension) 2011    Hyperlipidemia     Hypothyroid 07/01/2016    Obstructive sleep apnea 6/13/2022    Psoriasis     Stroke (HCC) 1997    Vertebral artery disection       Current Outpatient Medications   Medication Sig Dispense Refill    SYNTHROID 125 MCG Tab Take 1 Tablet by mouth every morning on an empty stomach. 90 Tablet 3    tadalafil (CIALIS) 20 MG tablet Take 1 Tablet by mouth 1 time a day as needed for Erectile Dysfunction. 20 Tablet 3    Continuous Blood Gluc  (FREESTYLE PITA 2 READER) Device Use device to check blood sugar readings 8-10X/day 1 Each 1    Continuous Blood Gluc Sensor (FREESTYLE PITA 2 SENSOR) Misc Apply 1 sensor every 14 days to monitor blood sugar 6 Each 3    benazepril (LOTENSIN) 5 MG Tab TAKE 1 TABLET BY MOUTH EVERY DAY 90 Tablet 3    rosuvastatin (CRESTOR) 20 MG Tab Take 1 Tablet by mouth every evening. 90 Tablet 3    calcipotriene (DOVONEX) 0.005 % Cream APPLY 2 GRAMS EXTERNALLY TO THE AFFECTED AREA TWICE DAILY 120 g 6    albuterol 108 (90 Base) MCG/ACT Aero Soln inhalation aerosol Inhale 2 Puffs by mouth every 6 hours as needed. 8.5 g 5    fluticasone (FLOVENT HFA) 110 MCG/ACT Aerosol Inhale 1 Puff by mouth 2 Times a Day. 1 Inhaler 3    B Complex Cap Take 1 Cap by mouth every day. 100 Cap 3    Cholecalciferol (VITAMIN D) 2000 UNITS Cap Take 1 Cap by mouth every day.      aspirin EC (ECOTRIN) 81 MG Tablet Delayed Response Take 81 mg by mouth every day.       No current facility-administered medications for this visit.         No Known Allergies      Social History     Tobacco Use     "Smoking status: Light Smoker     Types: Cigars    Smokeless tobacco: Never    Tobacco comments:     1 cigar per week   Vaping Use    Vaping Use: Never used   Substance Use Topics    Alcohol use: Yes     Alcohol/week: 8.4 oz     Types: 14 Standard drinks or equivalent per week     Comment: 2-3 drinks a week    Drug use: No       Past Surgical History:   Procedure Laterality Date    OTHER  age 12     nasal polyps        ROS    Ambulatory Vitals  /62 (BP Location: Left arm, Patient Position: Sitting, BP Cuff Size: Small adult)   Pulse (!) 59   Temp 37.2 °C (98.9 °F) (Temporal)   Resp 17   Ht 1.727 m (5' 8\")   Wt 74.8 kg (165 lb)   SpO2 99%   BMI 25.09 kg/m²     Physical Exam  Constitutional:       Appearance: Normal appearance.   Cardiovascular:      Rate and Rhythm: Normal rate and regular rhythm.      Heart sounds: Normal heart sounds. No murmur heard.    No friction rub. No gallop.   Pulmonary:      Effort: Pulmonary effort is normal.      Breath sounds: Normal breath sounds. No wheezing or rales.   Musculoskeletal:      Right lower leg: No edema.      Left lower leg: No edema.       Component      Latest Ref Rng & Units 4/14/2022 6/17/2022 6/17/2022 9/21/2022            8:15 AM  8:15 AM    Sodium      135 - 145 mmol/L  142  141   Potassium      3.6 - 5.5 mmol/L  4.2  4.1   Chloride      96 - 112 mmol/L  107  105   Co2      20 - 33 mmol/L  24  26   Anion Gap      7.0 - 16.0  11.0  10.0   Glucose      65 - 99 mg/dL  88  81   Bun      8 - 22 mg/dL  13  25 (H)   Creatinine      0.50 - 1.40 mg/dL  0.82  0.92   Calcium      8.5 - 10.5 mg/dL  9.5  9.5   AST(SGOT)      12 - 45 U/L  22  26   ALT(SGPT)      2 - 50 U/L  17  19   Alkaline Phosphatase      30 - 99 U/L  43  41   Total Bilirubin      0.1 - 1.5 mg/dL  0.7  0.6   Albumin      3.2 - 4.9 g/dL  4.1  4.3   Total Protein      6.0 - 8.2 g/dL  6.6  6.9   Globulin      1.9 - 3.5 g/dL  2.5  2.6   A-G Ratio      g/dL  1.6  1.7   Cholesterol,Tot      100 - 199 " mg/dL  153 161 170   Triglycerides      0 - 149 mg/dL  77 92 105   HDL      >=40 mg/dL  68 74 (H) 71   LDL Cholesterol      <=129 mg/dL   69    LDL Particle      <=1135 nmol/L   876    Small LDL      <=634 nmol/L   166    L-VLDL Particle No.      <=2.7 nmol/L   <1.5    HDL Particle No.      >=33.0 umol/L   37.0    L-HDL Particle No.      >=4.2 umol/L   10.0    LDL Particle Size      >=20.7 nm   21.0    VLDL Size      <=46.7 nm   41.6    HDL Size      >=8.9 nm   9.4    EER LipoFit by NMR         See Note    Testosterone,Total      300 - 720 ng/dL 661      Sex Hormone Bind Globulin      19 - 76 nmol/L 73      Free Testosterone      47 - 244 pg/mL 77      Testosterone % Free      1.6 - 2.9 % 1.2 (L)      LDL      <100 mg/dL  70  78   Luteinizing Hormone      1.7 - 8.6 IU/L 6.0      Follicle Stimulating Hormone      1.5 - 12.4 mIU/mL 4.8      Apolipoprotein-B      55 - 140 mg/dL 62 61     Lipoprotein (a)      <=29 mg/dL 33 (H)      Uric Acid      2.5 - 8.3 mg/dL 6.4      T3      60.0 - 181.0 ng/dL  75.4  74.9   Reverse T3      9.0 - 27.0 ng/dL  13.1     TSH      0.380 - 5.330 uIU/mL  2.740  4.640   Thyroxine -T4      4.0 - 12.0 ug/dL  5.7     C Reactive Protein High Sensitive      0.0 - 3.0 mg/L  0.2     GFR (CKD-EPI)      >60 mL/min/1.73 m 2  97  92   Prostatic Specific Antigen Tot      0.00 - 4.00 ng/mL    5.13 (H)   CPK Total      0 - 154 U/L    262 (H)     Component Ref Range & Units 6 mo ago    PSA Total 0.0 - 4.0 ng/mL 5.6 High     Comment: INTERPRETIVE INFORMATION: Prostate Specific Antigen   The Roche PSA electrochemiluminescent immunoassay is used. Results   obtained with different test methods or kits cannot be used   interchangeably. The Roche PSA method is approved for use as an   aid in the detection of prostate cancer when used in conjunction   with a digital rectal exam in individuals with a prostate age 50   years and older. The Roche PSA is also indicated for the serial   measurement of PSA to aid in  the prognosis and management of   prostate cancer patients. Elevated PSA concentrations can only   suggest the presence of prostate cancer until biopsy is performed.   PSA concentrations can also be elevated in benign prostatic   hyperplasia or inflammatory conditions of the prostate. PSA is   generally not elevated in healthy individuals or individuals with   nonprostatic carcinoma.    Free Psa ng/mL 0.9    % Free Psa % 16    Comment: INTERPRETIVE INFORMATION: Prostate Specific Antigen, Free   Percentage   [employer] uses the Roche Free PSA electrochemiluminescent immunoassay   method in conjunction with the Roche PSA electrochemiluminescent   immunoassay method to determine the free PSA percentage. Values   obtained with different assay methods should not be used   interchangeably. The free PSA percentage is an aid in   distinguishing prostate cancer from benign prostatic conditions in   individuals with a prostate age 50 years and older with a total   PSA between 3 and 10 ng/mL and negative digital rectal examination   findings. Prostatic biopsy is required for the diagnosis of   cancer.   In patients with total PSA concentrations of 4-10 ng/mL, the   probability of finding prostate cancer on needle biopsy by age in   years is:   %fPSA               50-59    60-69    70 or older   0  - 10%            49%      58%      65%   11 - 18%            27%      34%      41%   19 - 25%            18%      24%      30%   Greater than 25%     9%      12%      16%   Other factors may help determine the actual risk of prostate   cancer in individual patients.   Performed By: [facility]   [address] [place] [id number]   : Nohemy Araujo MD      Assessment and Plan:     1. Essential hypertension  Comp Metabolic Panel    Normotensive taking benazapril 5 mg qd      2. Hypothyroidism (acquired)  SYNTHROID 125 MCG Tab    TRIIDOTHYRONINE    TSH    TSH    TRIIDOTHYRONINE    TSH 4.640 uIU/ml and T3  74.9 ng/dl taking synthroid 112 mcg qam      3. Hypercholesterolemia  Lipid Profile    LDL-C 78 mg/dl taking rosuvastatin 20 mg qd      4. Elevated PSA  Referral to Urology    Comp Metabolic Panel      5. Elevated CPK  CREATINE KINASE    Likely due to statin Rx      6. History of vitamin D deficiency  VITAMIN D,25 HYDROXY (DEFICIENCY)      7. History of non anemic vitamin B12 deficiency  VITAMIN B12    CBC WITH DIFFERENTIAL        Since TSH has increased will increase synthroid to 125 mcg qam. Continue benazepril 5 mg qd for BP. Check TSH and T3 in 3 months. Check CBC, CMP, lipid panel, CPK, TSH/T3, B12, vitamin D in 6 months.    Toby Minor M.D.

## 2022-10-20 ENCOUNTER — APPOINTMENT (RX ONLY)
Dept: URBAN - METROPOLITAN AREA CLINIC 35 | Facility: CLINIC | Age: 67
Setting detail: DERMATOLOGY
End: 2022-10-20

## 2022-10-20 DIAGNOSIS — Z41.9 ENCOUNTER FOR PROCEDURE FOR PURPOSES OTHER THAN REMEDYING HEALTH STATE, UNSPECIFIED: ICD-10-CM

## 2022-10-20 PROCEDURE — ? BOTOX

## 2022-10-20 PROCEDURE — ? ADDITIONAL NOTES

## 2022-10-20 NOTE — HPI: COSMETIC (BOTOX)
Continued Stay Note  [facility]     Patient Name: Jose Miguel Martinez  MRN: [id number]  Today's Date: 8/23/2019    Admit Date: 8/22/2019    Discharge Plan     Row Name 08/23/19 1101       Plan    Plan  DC PLAN:  Routine Home, Current HD [facility] Prasad,F,         Discharge Codes    No documentation.             Maryan Sheets RN    
Have You Had Botox Before?: has had botox
When Was Your Last Botox Treatment?: 07/19/2022

## 2022-10-20 NOTE — PROCEDURE: BOTOX
Right Periorbital Skin Units: 0
Consent: Verbal and written informed consent were obtained to include the following risks: pain, swelling, bruising, eyelid or eyebrow droop, and lack of visible improvement of wrinkles in the areas treated.  The skin was cleansed with alcohol. Injections were administered with a 32g needle into the following areas:
Additional Area 3 Units: 26
Reconstitution Date (Optional): 4/7/22
Dilution (U/0.1 Cc): 1.1
Additional Area 3 Location: Frontalis
Lot #: [id number]
Detail Level: Detailed
Additional Area 2 Units: 73
Additional Area 5 Location: perioral
Additional Area 1 Location: Glabella
Expiration Date (Month Year): 5/25
Additional Area 6 Location: platysma
Price (Use Numbers Only, No Special Characters Or $): 1500
Post-Care Instructions: Patient instructed to not lie down for 4 hours after injections and limit physical activity for 24 hours.
Additional Area 4 Location: Bunny lines
Additional Area 2 Location: Crows Feet

## 2022-10-24 ENCOUNTER — APPOINTMENT (RX ONLY)
Dept: URBAN - METROPOLITAN AREA CLINIC 35 | Facility: CLINIC | Age: 67
Setting detail: DERMATOLOGY
End: 2022-10-24

## 2022-10-24 DIAGNOSIS — Z41.9 ENCOUNTER FOR PROCEDURE FOR PURPOSES OTHER THAN REMEDYING HEALTH STATE, UNSPECIFIED: ICD-10-CM

## 2022-10-24 PROCEDURE — ? SKINPEN

## 2022-10-24 NOTE — PROCEDURE: SKINPEN
Depth In Mm: 0.25
Location #1: neck and cheeks
Location #4: forehead
Treatment Number (Optional): 0
Detail Level: Zone
Location #2: chin
Depth In Mm: 0.5
Consent: Written consent obtained, risks reviewed including but not limited to pain, scarring, infection
Location #3: nose
Post-Care Instructions: After the procedure, take precautions agains sun exposure. Do not apply sunscreen for 12 hours after the procedure. Do not apply make-up for 12 hours after the procedure. Avoid alcohol based toners for 10-14 days. After 2-3 days patients can return to normal skin care regimen. Anteage serum applied to skin durning treatment
Price (Use Numbers Only, No Special Characters Or $): 500.00
Depth In Mm: 1.5

## 2022-10-24 NOTE — HPI: COSMETIC (MICRONEEDLING)
Have You Had Microneedling Treatments Before?: has had a previous microneedling treatment
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

## 2022-10-25 ENCOUNTER — NON-PROVIDER VISIT (OUTPATIENT)
Dept: INTERNAL MEDICINE | Facility: IMAGING CENTER | Age: 67
End: 2022-10-25
Payer: MEDICARE

## 2022-10-25 DIAGNOSIS — Z23 NEED FOR INFLUENZA VACCINATION: ICD-10-CM

## 2022-10-25 PROCEDURE — 90662 IIV NO PRSV INCREASED AG IM: CPT | Performed by: INTERNAL MEDICINE

## 2022-10-25 PROCEDURE — G0008 ADMIN INFLUENZA VIRUS VAC: HCPCS | Performed by: INTERNAL MEDICINE

## 2022-10-26 ENCOUNTER — OFFICE VISIT (OUTPATIENT)
Dept: SLEEP MEDICINE | Facility: MEDICAL CENTER | Age: 67
End: 2022-10-26
Payer: MEDICARE

## 2022-10-26 VITALS
DIASTOLIC BLOOD PRESSURE: 66 MMHG | OXYGEN SATURATION: 96 % | HEIGHT: 68 IN | HEART RATE: 56 BPM | WEIGHT: 165 LBS | BODY MASS INDEX: 25.01 KG/M2 | SYSTOLIC BLOOD PRESSURE: 122 MMHG

## 2022-10-26 DIAGNOSIS — R93.1 AGATSTON CAC SCORE, >400: ICD-10-CM

## 2022-10-26 DIAGNOSIS — G47.33 OSA (OBSTRUCTIVE SLEEP APNEA): ICD-10-CM

## 2022-10-26 DIAGNOSIS — I65.23 ATHEROSCLEROSIS OF BOTH CAROTID ARTERIES: ICD-10-CM

## 2022-10-26 DIAGNOSIS — E78.2 MIXED HYPERLIPIDEMIA WITH APOLIPOPROTEIN E3 VARIANT: ICD-10-CM

## 2022-10-26 DIAGNOSIS — Z86.73 HISTORY OF STROKE: ICD-10-CM

## 2022-10-26 DIAGNOSIS — I45.10 RIGHT BUNDLE BRANCH BLOCK: ICD-10-CM

## 2022-10-26 DIAGNOSIS — I10 HYPERTENSION, UNSPECIFIED TYPE: ICD-10-CM

## 2022-10-26 PROCEDURE — 99213 OFFICE O/P EST LOW 20 MIN: CPT | Performed by: NURSE PRACTITIONER

## 2022-10-26 ASSESSMENT — FIBROSIS 4 INDEX: FIB4 SCORE: 1.51

## 2022-10-26 NOTE — PROGRESS NOTES
Chief Complaint   Patient presents with    Apnea     GONZALEZ-Last seen 06/24/2022       HPI:      Mr. Dobbins is a 68 y/o male patient who is in today for GONZALEZ f/u. PMH includes asthma, psoriasis, history of stroke from vertebral artery dissection, mixed hyperlipidemia with apolipoprotein A3 variant, RBBB, Agaston CAC score >400, BPH, AAA, hypertension, arthrosclerosis of bilateral carotid arteries, hypothyroid, GONZALEZ, ED, light cigar smoker.    Patient was set up with a ResMed auto CPAP machine on 9/19/2022.  First compliance report from 9/26/22-10/25/22 was downloaded and reviewed with the patient which showed autoCPAP 4-7 cmH2O, 100% compliance, 8 hrs 52 min use, AHI of 2.1, moderate mask leak. He is tolerating the pressure and mask well. He goes to bed between 9-10 pm and wakes up at 7 am. He denies morning headache or snoring since being on CPAP therapy.  Overall he is sleeping well but is having some sleep disturbances from when he sleeps on the side and the mask shifts and causes a leak.  Or the pressure from sleeping on the side will disrupt the air flow through the mask which will then cause the pressure to increase and make noise.  He has really enjoyed however the humidifier portion of the CPAP which has significantly decreased the nasal congestion he was experiencing.  He does also wake up twice a night at least due to nocturia.  He continues to follow-up with Dr. Bloch.  Patient is exercising on his elliptical daily for an hour and also goes to the gym during the day where he lifts weights.  He has been using the elliptical typically in the evening but has been advised by another provider to try to exercise earlier in the afternoon to avoid over hydrating prior to bed which is further contributing to nocturia.  He denies any new health problems or medications.    Sleep/Card Study History:   PSG study from 6/1/22 indicated Mild OAS with AHI of 9.2/hr and O2 yoel 82 %. AHI during rem was 22.5 and AHI while supine  was 10.56. The patient spent 12.8 minutes of TST with SaO2 <88%.    NM Cardiac Stress test from 4/15/22 indicated No evidence of significant jeopardized viable myocardium or prior myocardial infarction. Normal left ventricular size, ejection fraction, and wall motion. LVEF 65%.  ST Depression during lexiscan administration which could be compatible with ischemia, but non-specific given RBBB. ECG INTERPRETATION abnormal ECG response to lexiscan.    ROS:    Constitutional: Denies fevers, Denies weight changes  Eyes: Denies changes in vision, no eye pain  Ears/Nose/Throat/Mouth: Denies nasal congestion or sore throat   Cardiovascular: Denies chest pain or palpitations   Respiratory: Denies shortness of breath , Denies cough  Gastrointestinal/Hepatic: Denies abdominal pain, nausea, vomiting,   Skin/Breast: Denies rash,   Neurological: Denies headache, confusion,   Psychiatric: denies mood disorder   Sleep: denies snoring       Past Medical History:   Diagnosis Date    ASTHMA     mild intermittent    Chickenpox     Family history of hypertension     History of stroke     HTN (hypertension) 2011    Hyperlipidemia     Hypothyroid 07/01/2016    Obstructive sleep apnea 6/13/2022    Psoriasis     Stroke (HCC) 1997    Vertebral artery disection       Past Surgical History:   Procedure Laterality Date    OTHER  age 12     nasal polyps       Family History   Problem Relation Age of Onset    Hypertension Mother     Other Father 45        Viral encephalitis    Sleep Apnea Neg Hx        Social History     Socioeconomic History    Marital status:      Spouse name: Jada    Number of children: 3    Years of education: 24    Highest education level: Not on file   Occupational History    Occupation: Orthopedic Surgeon   Tobacco Use    Smoking status: Light Smoker     Types: Cigars    Smokeless tobacco: Never    Tobacco comments:     1 cigar per week   Vaping Use    Vaping Use: Never used   Substance and Sexual Activity     Alcohol use: Yes     Alcohol/week: 8.4 oz     Types: 14 Standard drinks or equivalent per week     Comment: 2-3 drinks a week    Drug use: No    Sexual activity: Yes     Partners: Female   Other Topics Concern    Not on file   Social History Narrative    Not on file     Social Determinants of Health     Financial Resource Strain: Not on file   Food Insecurity: Not on file   Transportation Needs: Not on file   Physical Activity: Not on file   Stress: Not on file   Social Connections: Not on file   Intimate Partner Violence: Not on file   Housing Stability: Not on file       Allergies as of 10/26/2022    (No Known Allergies)        Vitals:  Vitals:    10/26/22 1441   BP: 122/66   Pulse: (!) 56   SpO2: 96%       Current medications as of today   Current Outpatient Medications   Medication Sig Dispense Refill    SYNTHROID 125 MCG Tab Take 1 Tablet by mouth every morning on an empty stomach. 90 Tablet 3    tadalafil (CIALIS) 20 MG tablet Take 1 Tablet by mouth 1 time a day as needed for Erectile Dysfunction. 20 Tablet 3    Continuous Blood Gluc  (FREESTYLE PITA 2 READER) Device Use device to check blood sugar readings 8-10X/day 1 Each 1    Continuous Blood Gluc Sensor (FREESTYLE PITA 2 SENSOR) Misc Apply 1 sensor every 14 days to monitor blood sugar 6 Each 3    benazepril (LOTENSIN) 5 MG Tab TAKE 1 TABLET BY MOUTH EVERY DAY 90 Tablet 3    rosuvastatin (CRESTOR) 20 MG Tab Take 1 Tablet by mouth every evening. 90 Tablet 3    calcipotriene (DOVONEX) 0.005 % Cream APPLY 2 GRAMS EXTERNALLY TO THE AFFECTED AREA TWICE DAILY 120 g 6    albuterol 108 (90 Base) MCG/ACT Aero Soln inhalation aerosol Inhale 2 Puffs by mouth every 6 hours as needed. 8.5 g 5    fluticasone (FLOVENT HFA) 110 MCG/ACT Aerosol Inhale 1 Puff by mouth 2 Times a Day. 1 Inhaler 3    B Complex Cap Take 1 Cap by mouth every day. 100 Cap 3    Cholecalciferol (VITAMIN D) 2000 UNITS Cap Take 1 Cap by mouth every day.      aspirin EC (ECOTRIN) 81 MG Tablet  Delayed Response Take 81 mg by mouth every day.       No current facility-administered medications for this visit.         Physical Exam: Limited by COVID-19 precautions.  Appearance: Well developed, well nourished, no acute distress  Eyes: PERRL, EOM intact, sclera white, conjunctiva moist  Ears: no lesions or deformities  Hearing: grossly intact  Nose: no lesions or deformities  Respiratory effort: no intercostal retractions or use of accessory muscles  Extremities: no cyanosis or edema  Abdomen: soft   Gait and Station: normal  Digits and nails: no clubbing, cyanosis, petechiae or nodes.  Cranial nerves: grossly intact  Skin: no visible rashes, lesions or ulcers noted  Orientation: Oriented to time, person and place  Mood and affect: mood and affect appropriate, normal interaction with examiner  Judgement: Intact    Assessment:  1. GONZALEZ (obstructive sleep apnea)  DME Other      2. RBBB        3. Hypertension, unspecified type        4. History of stroke from Vertebral Artery Dissection        5. Mixed hyperlipidemia with apolipoprotein E3 variant        6. Atherosclerosis of both carotid arteries, mild (US done April 2022)        7. Agatston CAC score, >400 (April 2022 total 587.1)        8. BMI 25.0-25.9,adult              Plan  Discussed the cardiovascular and neuropsychiatric risks of untreated GONZALEZ; including but not limited to: HTN, DM, MI, ASCVD, CVA, CHF, traffic accidents.     1. First compliance report from 9/26/22-10/25/22 was downloaded and reviewed with the patient which showed autoCPAP 4-7 cmH2O, 100% compliance, 8 hrs 52 min use, AHI of 2.1, moderate mask leak.  Patient is compliant and benefiting from autoCPAP therapy for management of GONZALEZ. Advised patient to continue to use the CPAP every night for more than four hours for optimal health benefit and to meet the health insurance 70% compliance guideline.     *DME order ([facility]) for mask (MOC) and supplies was not needed today. Continue to clean mask  and supplies weekly with soap and water, and change supplies per insurance guidelines.     *DME order for mask fit was placed in case the patient needs to switch to a different style nasal mask.  Patient was advised he may try a Contour CPAP pillow to help with current mask issue.    *Sleep hygiene discussed. Recommend keeping a set sleep/wake schedule. Logging enough hours of sleep. Limiting/Avoiding naps. No caffeine after noon and no heavy meals in the evening.     2-7. Continue to f/u with Dr. Bloch and PCP  8. Continue to stay active.   9. Follow up with appropriate healthcare providers for all other medical problems.  10. F/u in 6 months for GONZALEZ, sooner if needed.       VITOR Vasquez.      This dictation was created using voice recognition software. The accuracy of the dictation is limited to the abilities of the software. I expect there may be some errors of grammar and possibly content.

## 2022-11-08 ENCOUNTER — PATIENT MESSAGE (OUTPATIENT)
Dept: HEALTH INFORMATION MANAGEMENT | Facility: OTHER | Age: 67
End: 2022-11-08

## 2022-12-13 ENCOUNTER — APPOINTMENT (RX ONLY)
Dept: URBAN - METROPOLITAN AREA CLINIC 35 | Facility: CLINIC | Age: 67
Setting detail: DERMATOLOGY
End: 2022-12-13

## 2022-12-13 DIAGNOSIS — Z41.9 ENCOUNTER FOR PROCEDURE FOR PURPOSES OTHER THAN REMEDYING HEALTH STATE, UNSPECIFIED: ICD-10-CM

## 2022-12-13 PROCEDURE — ? SKINPEN

## 2022-12-13 NOTE — PROCEDURE: SKINPEN
Depth In Mm: 0.25
Depth In Mm: 1.5
Location #4: forehead
Location #2: chin
Price (Use Numbers Only, No Special Characters Or $): 500.00
Post-Care Instructions: After the procedure, take precautions agains sun exposure. Do not apply sunscreen for 12 hours after the procedure. Do not apply make-up for 12 hours after the procedure. Avoid alcohol based toners for 10-14 days. After 2-3 days patients can return to normal skin care regimen. Anteage serum applied to skin durning treatment
Depth In Mm: 0.5
Treatment Number (Optional): 0
Detail Level: Zone
Consent: Written consent obtained, risks reviewed including but not limited to pain, scarring, infection
Location #3: nose
Location #1: neck and cheeks

## 2022-12-23 ENCOUNTER — HOSPITAL ENCOUNTER (OUTPATIENT)
Facility: MEDICAL CENTER | Age: 67
End: 2022-12-23
Attending: INTERNAL MEDICINE
Payer: MEDICARE

## 2022-12-23 ENCOUNTER — NON-PROVIDER VISIT (OUTPATIENT)
Dept: INTERNAL MEDICINE | Facility: IMAGING CENTER | Age: 67
End: 2022-12-23
Payer: MEDICARE

## 2022-12-23 DIAGNOSIS — E03.9 HYPOTHYROIDISM (ACQUIRED): ICD-10-CM

## 2022-12-23 DIAGNOSIS — Z01.89 ENCOUNTER FOR ROUTINE LABORATORY TESTING: ICD-10-CM

## 2022-12-23 LAB
T3 SERPL-MCNC: 92 NG/DL (ref 60–181)
TSH SERPL DL<=0.005 MIU/L-ACNC: 0.75 UIU/ML (ref 0.38–5.33)

## 2022-12-23 PROCEDURE — 84443 ASSAY THYROID STIM HORMONE: CPT

## 2022-12-23 PROCEDURE — 84480 ASSAY TRIIODOTHYRONINE (T3): CPT

## 2023-01-19 ENCOUNTER — APPOINTMENT (RX ONLY)
Dept: URBAN - METROPOLITAN AREA CLINIC 35 | Facility: CLINIC | Age: 68
Setting detail: DERMATOLOGY
End: 2023-01-19

## 2023-01-19 DIAGNOSIS — Z41.9 ENCOUNTER FOR PROCEDURE FOR PURPOSES OTHER THAN REMEDYING HEALTH STATE, UNSPECIFIED: ICD-10-CM

## 2023-01-19 PROCEDURE — ? ADDITIONAL NOTES

## 2023-01-19 PROCEDURE — ? BOTOX

## 2023-01-19 NOTE — PROCEDURE: BOTOX
Right Periorbital Skin Units: 0
Consent: Verbal and written informed consent were obtained to include the following risks: pain, swelling, bruising, eyelid or eyebrow droop, and lack of visible improvement of wrinkles in the areas treated.  The skin was cleansed with alcohol. Injections were administered with a 32g needle into the following areas:
Additional Area 3 Units: 26
Reconstitution Date (Optional): 4/7/22
Dilution (U/0.1 Cc): 1.1
Additional Area 3 Location: Frontalis
Lot #: [id number]
Detail Level: Detailed
Additional Area 2 Units: 73
Additional Area 5 Location: perioral
Additional Area 1 Location: Glabella
Expiration Date (Month Year): 5/25
Additional Area 6 Location: platysma
Price (Use Numbers Only, No Special Characters Or $): 1500
Post-Care Instructions: Patient instructed to not lie down for 4 hours after injections and limit physical activity for 24 hours.
Additional Area 4 Location: Bunny lines
Additional Area 2 Location: Crows Feet

## 2023-01-19 NOTE — PROCEDURE: BOTOX
Right Periorbital Skin Units: 0
Consent: Verbal and written informed consent were obtained to include the following risks: pain, swelling, bruising, eyelid or eyebrow droop, and lack of visible improvement of wrinkles in the areas treated.  The skin was cleansed with alcohol. Injections were administered with a 32g needle into the following areas:
Additional Area 3 Units: 26
Reconstitution Date (Optional): 4/7/22
Dilution (U/0.1 Cc): 1.1
Additional Area 3 Location: Frontalis
Lot #: [id number]
Detail Level: Detailed
Additional Area 2 Units: 73
Additional Area 5 Location: perioral
Additional Area 1 Location: Glabella
Expiration Date (Month Year): 09/25
Additional Area 6 Location: platysma
Price (Use Numbers Only, No Special Characters Or $): 3283
Post-Care Instructions: Patient instructed to not lie down for 4 hours after injections and limit physical activity for 24 hours.
Additional Area 4 Location: Bunny lines
Additional Area 2 Location: Crows Feet

## 2023-01-31 ENCOUNTER — APPOINTMENT (RX ONLY)
Dept: URBAN - METROPOLITAN AREA CLINIC 35 | Facility: CLINIC | Age: 68
Setting detail: DERMATOLOGY
End: 2023-01-31

## 2023-01-31 DIAGNOSIS — Z41.9 ENCOUNTER FOR PROCEDURE FOR PURPOSES OTHER THAN REMEDYING HEALTH STATE, UNSPECIFIED: ICD-10-CM

## 2023-01-31 PROCEDURE — ? FRAXEL RESTORE DUAL

## 2023-01-31 ASSESSMENT — LOCATION ZONE DERM: LOCATION ZONE: FACE

## 2023-01-31 ASSESSMENT — LOCATION SIMPLE DESCRIPTION DERM: LOCATION SIMPLE: LEFT FOREHEAD

## 2023-01-31 ASSESSMENT — LOCATION DETAILED DESCRIPTION DERM: LOCATION DETAILED: LEFT INFERIOR MEDIAL FOREHEAD

## 2023-01-31 NOTE — PROCEDURE: FRAXEL RESTORE DUAL
Topical Anesthesia?: 23% lidocaine, 7% tetracaine
Pre-Procedure Text: 1.5 hours numbing
Length Topical Anesthesia Applied (Optional): 60 minutes
Post-Care Instructions: I reviewed with the patient in detail post-care instructions.
Passes (Optional): 8
Actual Kj Used (Optional): 2.53
Treatment Number (Optional): 19
Eye Shielding Text (Leave Blank If Unwanted- Will Be Inserted If Selecting Eye Shields): The intraocular eye shields were placed. 2 drops of intraocular tetracaine HCL ophthalmic 0.5% solution was administered. The eye shields were coated with ophthalmic bacitracin prior to insertion. After the shields were removed the eyes were flushed with normal saline.
Consent: Written consent obtained, risks reviewed including but not limited to crusting, scabbing, blistering, scarring, darker or lighter pigmentary change, and/or incomplete removal.
Detail Level: Zone
Energy In Mj (Optional): 20
Price (Use Numbers Only, No Special Characters Or $): 2994

## 2023-02-28 DIAGNOSIS — L40.9 PSORIASIS: ICD-10-CM

## 2023-02-28 RX ORDER — CALCIPOTRIENE 50 UG/G
CREAM TOPICAL
Qty: 120 G | Refills: 6 | Status: SHIPPED | OUTPATIENT
Start: 2023-02-28 | End: 2024-03-18 | Stop reason: SDUPTHER

## 2023-03-02 DIAGNOSIS — E78.2 MIXED HYPERLIPIDEMIA WITH APOLIPOPROTEIN E3 VARIANT: ICD-10-CM

## 2023-03-02 RX ORDER — ROSUVASTATIN CALCIUM 20 MG/1
20 TABLET, COATED ORAL EVERY EVENING
Qty: 90 TABLET | Refills: 3 | Status: SHIPPED | OUTPATIENT
Start: 2023-03-02 | End: 2023-05-30 | Stop reason: SDUPTHER

## 2023-03-07 DIAGNOSIS — E03.9 HYPOTHYROIDISM (ACQUIRED): ICD-10-CM

## 2023-03-09 DIAGNOSIS — I10 ESSENTIAL HYPERTENSION: ICD-10-CM

## 2023-03-09 DIAGNOSIS — E78.00 HYPERCHOLESTEROLEMIA: ICD-10-CM

## 2023-03-09 DIAGNOSIS — Z79.899 LONG TERM USE OF DRUG: ICD-10-CM

## 2023-03-09 DIAGNOSIS — Z86.39 HISTORY OF NON ANEMIC VITAMIN B12 DEFICIENCY: ICD-10-CM

## 2023-03-09 DIAGNOSIS — R74.8 ELEVATED CPK: ICD-10-CM

## 2023-03-09 DIAGNOSIS — E03.9 HYPOTHYROIDISM (ACQUIRED): ICD-10-CM

## 2023-03-09 DIAGNOSIS — E55.9 VITAMIN D DEFICIENCY: ICD-10-CM

## 2023-03-29 ENCOUNTER — HOSPITAL ENCOUNTER (OUTPATIENT)
Facility: MEDICAL CENTER | Age: 68
End: 2023-03-29
Attending: INTERNAL MEDICINE
Payer: MEDICARE

## 2023-03-29 ENCOUNTER — NON-PROVIDER VISIT (OUTPATIENT)
Dept: INTERNAL MEDICINE | Facility: IMAGING CENTER | Age: 68
End: 2023-03-29
Payer: MEDICARE

## 2023-03-29 ENCOUNTER — HOSPITAL ENCOUNTER (OUTPATIENT)
Facility: MEDICAL CENTER | Age: 68
End: 2023-03-29
Attending: UROLOGY
Payer: MEDICARE

## 2023-03-29 LAB
25(OH)D3 SERPL-MCNC: 37 NG/ML (ref 30–100)
ALBUMIN SERPL BCP-MCNC: 4.2 G/DL (ref 3.2–4.9)
ALBUMIN/GLOB SERPL: 1.4 G/DL
ALP SERPL-CCNC: 56 U/L (ref 30–99)
ALT SERPL-CCNC: 27 U/L (ref 2–50)
ANION GAP SERPL CALC-SCNC: 11 MMOL/L (ref 7–16)
AST SERPL-CCNC: 25 U/L (ref 12–45)
BASOPHILS # BLD AUTO: 1.2 % (ref 0–1.8)
BASOPHILS # BLD: 0.07 K/UL (ref 0–0.12)
BILIRUB SERPL-MCNC: 0.7 MG/DL (ref 0.1–1.5)
BUN SERPL-MCNC: 18 MG/DL (ref 8–22)
CALCIUM ALBUM COR SERPL-MCNC: 9.3 MG/DL (ref 8.5–10.5)
CALCIUM SERPL-MCNC: 9.5 MG/DL (ref 8.5–10.5)
CHLORIDE SERPL-SCNC: 107 MMOL/L (ref 96–112)
CHOLEST SERPL-MCNC: 147 MG/DL (ref 100–199)
CK SERPL-CCNC: 223 U/L (ref 0–154)
CO2 SERPL-SCNC: 25 MMOL/L (ref 20–33)
CREAT SERPL-MCNC: 0.83 MG/DL (ref 0.5–1.4)
EOSINOPHIL # BLD AUTO: 0.48 K/UL (ref 0–0.51)
EOSINOPHIL NFR BLD: 8.1 % (ref 0–6.9)
ERYTHROCYTE [DISTWIDTH] IN BLOOD BY AUTOMATED COUNT: 44 FL (ref 35.9–50)
GFR SERPLBLD CREATININE-BSD FMLA CKD-EPI: 97 ML/MIN/1.73 M 2
GLOBULIN SER CALC-MCNC: 3 G/DL (ref 1.9–3.5)
GLUCOSE SERPL-MCNC: 85 MG/DL (ref 65–99)
HCT VFR BLD AUTO: 49 % (ref 42–52)
HDLC SERPL-MCNC: 68 MG/DL
HGB BLD-MCNC: 16.1 G/DL (ref 14–18)
IMM GRANULOCYTES # BLD AUTO: 0.02 K/UL (ref 0–0.11)
IMM GRANULOCYTES NFR BLD AUTO: 0.3 % (ref 0–0.9)
LDLC SERPL CALC-MCNC: 61 MG/DL
LYMPHOCYTES # BLD AUTO: 1.61 K/UL (ref 1–4.8)
LYMPHOCYTES NFR BLD: 27.3 % (ref 22–41)
MCH RBC QN AUTO: 29.3 PG (ref 27–33)
MCHC RBC AUTO-ENTMCNC: 32.9 G/DL (ref 33.7–35.3)
MCV RBC AUTO: 89.1 FL (ref 81.4–97.8)
MONOCYTES # BLD AUTO: 0.72 K/UL (ref 0–0.85)
MONOCYTES NFR BLD AUTO: 12.2 % (ref 0–13.4)
NEUTROPHILS # BLD AUTO: 2.99 K/UL (ref 1.82–7.42)
NEUTROPHILS NFR BLD: 50.9 % (ref 44–72)
NRBC # BLD AUTO: 0 K/UL
NRBC BLD-RTO: 0 /100 WBC
PLATELET # BLD AUTO: 228 K/UL (ref 164–446)
PMV BLD AUTO: 11.9 FL (ref 9–12.9)
POTASSIUM SERPL-SCNC: 4.5 MMOL/L (ref 3.6–5.5)
PROT SERPL-MCNC: 7.2 G/DL (ref 6–8.2)
PSA SERPL-MCNC: 7.18 NG/ML (ref 0–4)
RBC # BLD AUTO: 5.5 M/UL (ref 4.7–6.1)
SODIUM SERPL-SCNC: 143 MMOL/L (ref 135–145)
T4 FREE SERPL-MCNC: 1.68 NG/DL (ref 0.93–1.7)
TRIGL SERPL-MCNC: 88 MG/DL (ref 0–149)
TSH SERPL DL<=0.005 MIU/L-ACNC: 0.46 UIU/ML (ref 0.38–5.33)
VIT B12 SERPL-MCNC: 989 PG/ML (ref 211–911)
WBC # BLD AUTO: 5.9 K/UL (ref 4.8–10.8)

## 2023-03-29 PROCEDURE — 80061 LIPID PANEL: CPT

## 2023-03-29 PROCEDURE — 82607 VITAMIN B-12: CPT

## 2023-03-29 PROCEDURE — 85025 COMPLETE CBC W/AUTO DIFF WBC: CPT

## 2023-03-29 PROCEDURE — 80053 COMPREHEN METABOLIC PANEL: CPT

## 2023-03-29 PROCEDURE — 84439 ASSAY OF FREE THYROXINE: CPT

## 2023-03-29 PROCEDURE — 82306 VITAMIN D 25 HYDROXY: CPT

## 2023-03-29 PROCEDURE — 84443 ASSAY THYROID STIM HORMONE: CPT

## 2023-03-29 PROCEDURE — 84153 ASSAY OF PSA TOTAL: CPT

## 2023-03-29 PROCEDURE — 82550 ASSAY OF CK (CPK): CPT

## 2023-04-11 ENCOUNTER — APPOINTMENT (RX ONLY)
Dept: URBAN - METROPOLITAN AREA CLINIC 35 | Facility: CLINIC | Age: 68
Setting detail: DERMATOLOGY
End: 2023-04-11

## 2023-04-11 DIAGNOSIS — Z41.9 ENCOUNTER FOR PROCEDURE FOR PURPOSES OTHER THAN REMEDYING HEALTH STATE, UNSPECIFIED: ICD-10-CM

## 2023-04-11 PROCEDURE — ? FRAXEL RESTORE DUAL

## 2023-04-11 ASSESSMENT — LOCATION SIMPLE DESCRIPTION DERM: LOCATION SIMPLE: LEFT FOREHEAD

## 2023-04-11 ASSESSMENT — LOCATION ZONE DERM: LOCATION ZONE: FACE

## 2023-04-11 ASSESSMENT — LOCATION DETAILED DESCRIPTION DERM: LOCATION DETAILED: LEFT INFERIOR MEDIAL FOREHEAD

## 2023-04-11 NOTE — PROCEDURE: FRAXEL RESTORE DUAL
Topical Anesthesia?: 23% lidocaine, 7% tetracaine
Pre-Procedure Text: 1.5 hours numbing
Length Topical Anesthesia Applied (Optional): 60 minutes
Post-Care Instructions: I reviewed with the patient in detail post-care instructions.
Passes (Optional): 8
Actual Kj Used (Optional): 2.85
Treatment Level (Optional): 9
Treatment Number (Optional): 20
Eye Shielding Text (Leave Blank If Unwanted- Will Be Inserted If Selecting Eye Shields): The intraocular eye shields were placed. 2 drops of intraocular tetracaine HCL ophthalmic 0.5% solution was administered. The eye shields were coated with ophthalmic bacitracin prior to insertion. After the shields were removed the eyes were flushed with normal saline.
Consent: Written consent obtained, risks reviewed including but not limited to crusting, scabbing, blistering, scarring, darker or lighter pigmentary change, and/or incomplete removal.
Detail Level: Zone
Price (Use Numbers Only, No Special Characters Or $): 6799

## 2023-04-12 ENCOUNTER — OFFICE VISIT (OUTPATIENT)
Dept: INTERNAL MEDICINE | Facility: IMAGING CENTER | Age: 68
End: 2023-04-12
Payer: MEDICARE

## 2023-04-12 VITALS
HEART RATE: 63 BPM | DIASTOLIC BLOOD PRESSURE: 76 MMHG | SYSTOLIC BLOOD PRESSURE: 130 MMHG | RESPIRATION RATE: 12 BRPM | TEMPERATURE: 97.8 F | BODY MASS INDEX: 25.16 KG/M2 | HEIGHT: 68 IN | OXYGEN SATURATION: 95 % | WEIGHT: 166 LBS

## 2023-04-12 DIAGNOSIS — J45.20 MILD INTERMITTENT ASTHMA WITHOUT COMPLICATION: ICD-10-CM

## 2023-04-12 DIAGNOSIS — G47.33 OBSTRUCTIVE SLEEP APNEA: ICD-10-CM

## 2023-04-12 DIAGNOSIS — E78.00 HYPERCHOLESTEROLEMIA: ICD-10-CM

## 2023-04-12 DIAGNOSIS — E03.9 HYPOTHYROIDISM (ACQUIRED): ICD-10-CM

## 2023-04-12 DIAGNOSIS — R97.20 ELEVATED PSA: ICD-10-CM

## 2023-04-12 DIAGNOSIS — I10 ESSENTIAL HYPERTENSION: ICD-10-CM

## 2023-04-12 DIAGNOSIS — R74.8 ELEVATED CPK: ICD-10-CM

## 2023-04-12 DIAGNOSIS — R93.1 AGATSTON CAC SCORE, >400: ICD-10-CM

## 2023-04-12 PROCEDURE — 99215 OFFICE O/P EST HI 40 MIN: CPT | Performed by: INTERNAL MEDICINE

## 2023-04-12 RX ORDER — BENAZEPRIL HYDROCHLORIDE 10 MG/1
10 TABLET ORAL DAILY
Qty: 90 TABLET | Refills: 3 | Status: SHIPPED | OUTPATIENT
Start: 2023-04-12 | End: 2023-06-26 | Stop reason: SDUPTHER

## 2023-04-12 RX ORDER — BENAZEPRIL HYDROCHLORIDE 10 MG/1
10 TABLET ORAL DAILY
Qty: 90 TABLET | Refills: 3 | Status: SHIPPED
Start: 2023-04-12 | End: 2023-04-12 | Stop reason: DRUGHIGH

## 2023-04-12 ASSESSMENT — ACTIVITIES OF DAILY LIVING (ADL): BATHING_REQUIRES_ASSISTANCE: 0

## 2023-04-12 ASSESSMENT — PATIENT HEALTH QUESTIONNAIRE - PHQ9: CLINICAL INTERPRETATION OF PHQ2 SCORE: 0

## 2023-04-12 ASSESSMENT — FIBROSIS 4 INDEX: FIB4 SCORE: 1.39

## 2023-04-12 ASSESSMENT — ENCOUNTER SYMPTOMS: GENERAL WELL-BEING: GOOD

## 2023-04-12 NOTE — PROGRESS NOTES
Chief Complaint   Patient presents with    [facility]    Hyperlipidemia    Hypertension       HISTORY OF THE PRESENT ILLNESS: Patient is a 68 y.o. male.     Patient comes in to [facility].  He was previously seen by Dr. Hollis who recently retired.  Patient considers himself in good health.  He exercises regularly including skiing 5 days a week and playing golf.  His diet is good.  He drinks alcohol in moderation.  No tobacco products.    He is up-to-date on colonoscopy and immunizations.  He had a bone density in 2017 which was normal.    He had recent laboratory that showed elevated PSA.  His PSA was 7.1.  He has a history of vacillating PSA levels.  He is followed by Dr. Kelly.  He had an MRI last year that showed changes consistent with prostatitis but no suspicious nodules or other.  He experiences 2 episodes of nocturia but he drinks a moderate amount of water in the evening.    He has a history of coronary artery disease based on cardiac CT.  He had a scan in April 2022 with a total score of 587.  He is on moderate dose statin.  His LDL cholesterol is 61.  He has no cardiac symptoms.  He is followed by Dr. Bloch.  He had a negative stress test in 2022.    Patient was diagnosed with hypothyroidim.  This was based on laboratory.  He was never symptomatic.  He is now on 125 mcg levothyroxine.  His thyroid function tests are normal.    Patient has hypertension.  He is currently on Lotensin 5 mg.  He is found that his systolic pressure is in the 130s.  No endorgan disease.  Microalbumin has been undetectable however this has not been assessed since 2018.  Normal LVH function on stress echo in 2018.    He has a history of pulmonary disease including asthma and sleep apnea.  The asthma is mild and predominantly when he travels to the [facility] or [facility].  He rarely uses Flovent or albuterol locally.  He takes it when he travels and will use it as needed.  His sleep apnea has been stable on CPAP.  He  "shows 100% compliance with an AHI of 2.1.  He is followed by pulmonology.    Allergies: Patient has no known allergies.    Current Outpatient Medications Ordered in Epic   Medication Sig Dispense Refill    benazepril (LOTENSIN) 10 MG Tab Take 1 Tablet by mouth every day. 90 Tablet 3    rosuvastatin (CRESTOR) 20 MG Tab TAKE 1 TABLET BY MOUTH EVERY EVENING 90 Tablet 3    calcipotriene (DOVONEX) 0.005 % Cream APPLY 2 GRAMS EXTERNALLY TO THE AFFECTED AREA TWICE DAILY 120 g 6    SYNTHROID 125 MCG Tab Take 1 Tablet by mouth every morning on an empty stomach. 90 Tablet 3    tadalafil (CIALIS) 20 MG tablet Take 1 Tablet by mouth 1 time a day as needed for Erectile Dysfunction. 20 Tablet 3    benazepril (LOTENSIN) 5 MG Tab TAKE 1 TABLET BY MOUTH EVERY DAY 90 Tablet 3    albuterol 108 (90 Base) MCG/ACT Aero Soln inhalation aerosol Inhale 2 Puffs by mouth every 6 hours as needed. 8.5 g 5    fluticasone (FLOVENT HFA) 110 MCG/ACT Aerosol Inhale 1 Puff by mouth 2 Times a Day. 1 Inhaler 3    B Complex Cap Take 1 Cap by mouth every day. 100 Cap 3    Cholecalciferol (VITAMIN D) 2000 UNITS Cap Take 1 Cap by mouth every day.      aspirin EC (ECOTRIN) 81 MG Tablet Delayed Response Take 81 mg by mouth every day.       No current [facility]-ordered facility-administered medications on file.       Past medical history, social history and family history were reviewed from chart today    Review of systems: Per HPI.    All others negative.     Exam: /76 (BP Location: Left arm, Patient Position: Sitting, BP Cuff Size: Adult)   Pulse 63   Temp 36.6 °C (97.8 °F) (Temporal)   Resp 12   Ht 1.727 m (5' 8\")   Wt 75.3 kg (166 lb)   SpO2 95%   General: Physically fit.  Well-appearing.  HEENT: Grossly normal. Oral cavity is pink and moist.   Neck: Supple without JVD or bruit.  Pulmonary: Clear with good breath sounds. Normal effort.  Cardiovascular: Regular. Carotid and radial pulses are intact.  Abdomen: Soft, nontender, nondistended. Spleen " and liver are not enlarged.  Neurologic: Cranial nerves II through XII are grossly normal, alert and oriented x3      Diagnosis:  1. Essential hypertension  benazepril (LOTENSIN) 10 MG Tab      2. Hypercholesterolemia        3. Hypothyroidism (acquired)        4. Elevated CPK        5. Mild intermittent asthma without complication        6. Agatston CAC score, >400 (April 2022 total 587.1)        7. Obstructive sleep apnea, mild (2022 polysomnography)        8. Elevated PSA            Very healthy 68-year-old male.    Offered encouragement on lifestyle including exercise, diet, alcohol moderation and no tobacco products.    We discussed his PSA.  I suspect this is related to prostatitis issues.  He had MRI last year which was negative for suspicious lesion.  He is also followed by urology.  At this time the plan is to recheck in 1 month.  He will abstain from activities which may increase the PSA.    We discussed his cardiovascular disease.  He has known atherosclerosis without symptoms.  He exercises aggressively, regularly without issue.  He had a negative stress test last year.  We discussed that blood pressure and lipid management is essential.  I recommended increasing his Lotensin back to 10 mg.  We also discussed a goal LDL <50.  His current LDL is 61.  At this time we will monitor only.  Consider increasing to high-dose statin or adding Zetia.  Patient is also followed closely by Dr. Bloch.    Elevated CPK is due to activity.  I recommend no further work-up or treatment.  I do not believe it is related to his statin treatment.    Asthma and sleep apnea are stable on current medications.  He is followed by pulmonology.  Refill of albuterol or Flovent as needed.    Follow-up here in 6 months.  Continue to monitor blood pressure.  Recommend checking twice weekly.    My total time spent caring for the patient on the day of the encounter was  greater than 40 minutes.   This includes obtaining history, reviewing  chart, physical exam, patient education, reviewing outside records, placing orders, interpreting tests and coordinating care.

## 2023-04-18 ENCOUNTER — HOSPITAL ENCOUNTER (OUTPATIENT)
Dept: RADIOLOGY | Facility: MEDICAL CENTER | Age: 68
End: 2023-04-18
Attending: INTERNAL MEDICINE
Payer: COMMERCIAL

## 2023-04-18 DIAGNOSIS — E78.2 MIXED HYPERLIPIDEMIA WITH APOLIPOPROTEIN E3 VARIANT: ICD-10-CM

## 2023-04-18 PROCEDURE — 306734 US-TOTAL VASCULAR SCREENING W/CIMT (S/P)

## 2023-04-18 PROCEDURE — 93998 UNLISTD NONINVAS VASC DX STD: CPT

## 2023-04-19 ENCOUNTER — APPOINTMENT (RX ONLY)
Dept: URBAN - METROPOLITAN AREA CLINIC 35 | Facility: CLINIC | Age: 68
Setting detail: DERMATOLOGY
End: 2023-04-19

## 2023-04-19 DIAGNOSIS — Z41.9 ENCOUNTER FOR PROCEDURE FOR PURPOSES OTHER THAN REMEDYING HEALTH STATE, UNSPECIFIED: ICD-10-CM

## 2023-04-19 PROCEDURE — ? ADDITIONAL NOTES

## 2023-04-19 PROCEDURE — ? BOTOX

## 2023-04-19 NOTE — PROCEDURE: BOTOX
Right Periorbital Skin Units: 0
Consent: Verbal and written informed consent were obtained to include the following risks: pain, swelling, bruising, eyelid or eyebrow droop, and lack of visible improvement of wrinkles in the areas treated.  The skin was cleansed with alcohol. Injections were administered with a 32g needle into the following areas:
Additional Area 3 Units: 26
Reconstitution Date (Optional): 4/7/22
Dilution (U/0.1 Cc): 1.1
Additional Area 3 Location: Frontalis
Lot #: [id number]
Detail Level: Detailed
Additional Area 2 Units: 73
Additional Area 5 Location: perioral
Additional Area 1 Location: Glabella
Expiration Date (Month Year): 09/25
Additional Area 6 Location: platysma
Price (Use Numbers Only, No Special Characters Or $): 0248
Post-Care Instructions: Patient instructed to not lie down for 4 hours after injections and limit physical activity for 24 hours.
Additional Area 4 Location: Bunny lines
Additional Area 2 Location: Crows Feet

## 2023-04-27 ENCOUNTER — OFFICE VISIT (OUTPATIENT)
Dept: SLEEP MEDICINE | Facility: MEDICAL CENTER | Age: 68
End: 2023-04-27
Attending: NURSE PRACTITIONER
Payer: MEDICARE

## 2023-04-27 ENCOUNTER — NON-PROVIDER VISIT (OUTPATIENT)
Dept: INTERNAL MEDICINE | Facility: IMAGING CENTER | Age: 68
End: 2023-04-27
Payer: MEDICARE

## 2023-04-27 ENCOUNTER — HOSPITAL ENCOUNTER (OUTPATIENT)
Facility: MEDICAL CENTER | Age: 68
End: 2023-04-27
Attending: INTERNAL MEDICINE
Payer: MEDICARE

## 2023-04-27 VITALS
DIASTOLIC BLOOD PRESSURE: 76 MMHG | OXYGEN SATURATION: 97 % | WEIGHT: 167 LBS | SYSTOLIC BLOOD PRESSURE: 116 MMHG | HEIGHT: 68 IN | RESPIRATION RATE: 16 BRPM | HEART RATE: 54 BPM | BODY MASS INDEX: 25.31 KG/M2

## 2023-04-27 DIAGNOSIS — E78.2 MIXED HYPERLIPIDEMIA WITH APOLIPOPROTEIN E3 VARIANT: ICD-10-CM

## 2023-04-27 DIAGNOSIS — G47.33 OSA (OBSTRUCTIVE SLEEP APNEA): ICD-10-CM

## 2023-04-27 DIAGNOSIS — I10 HYPERTENSION, UNSPECIFIED TYPE: ICD-10-CM

## 2023-04-27 DIAGNOSIS — R93.1 AGATSTON CAC SCORE, >400: ICD-10-CM

## 2023-04-27 DIAGNOSIS — I65.23 ATHEROSCLEROSIS OF BOTH CAROTID ARTERIES: ICD-10-CM

## 2023-04-27 DIAGNOSIS — E03.9 HYPOTHYROIDISM (ACQUIRED): ICD-10-CM

## 2023-04-27 DIAGNOSIS — Z86.73 HISTORY OF STROKE: ICD-10-CM

## 2023-04-27 DIAGNOSIS — E78.00 HYPERCHOLESTEROLEMIA: ICD-10-CM

## 2023-04-27 DIAGNOSIS — I45.10 RIGHT BUNDLE BRANCH BLOCK: ICD-10-CM

## 2023-04-27 DIAGNOSIS — R97.20 ELEVATED PSA: ICD-10-CM

## 2023-04-27 LAB
ALBUMIN SERPL BCP-MCNC: 3.9 G/DL (ref 3.2–4.9)
ALBUMIN/GLOB SERPL: 1.6 G/DL
ALP SERPL-CCNC: 41 U/L (ref 30–99)
ALT SERPL-CCNC: 17 U/L (ref 2–50)
ANION GAP SERPL CALC-SCNC: 8 MMOL/L (ref 7–16)
AST SERPL-CCNC: 21 U/L (ref 12–45)
BILIRUB SERPL-MCNC: 0.6 MG/DL (ref 0.1–1.5)
BUN SERPL-MCNC: 26 MG/DL (ref 8–22)
CALCIUM ALBUM COR SERPL-MCNC: 9.2 MG/DL (ref 8.5–10.5)
CALCIUM SERPL-MCNC: 9.1 MG/DL (ref 8.5–10.5)
CHLORIDE SERPL-SCNC: 109 MMOL/L (ref 96–112)
CHOLEST SERPL-MCNC: 152 MG/DL (ref 100–199)
CO2 SERPL-SCNC: 24 MMOL/L (ref 20–33)
CREAT SERPL-MCNC: 0.69 MG/DL (ref 0.5–1.4)
CRP SERPL HS-MCNC: <0.2 MG/L (ref 0–3)
GFR SERPLBLD CREATININE-BSD FMLA CKD-EPI: 102 ML/MIN/1.73 M 2
GLOBULIN SER CALC-MCNC: 2.5 G/DL (ref 1.9–3.5)
GLUCOSE SERPL-MCNC: 83 MG/DL (ref 65–99)
HDLC SERPL-MCNC: 54 MG/DL
LDLC SERPL CALC-MCNC: 85 MG/DL
POTASSIUM SERPL-SCNC: 4.3 MMOL/L (ref 3.6–5.5)
PROT SERPL-MCNC: 6.4 G/DL (ref 6–8.2)
PSA SERPL-MCNC: 4.25 NG/ML (ref 0–4)
SODIUM SERPL-SCNC: 141 MMOL/L (ref 135–145)
TRIGL SERPL-MCNC: 63 MG/DL (ref 0–149)
TSH SERPL DL<=0.005 MIU/L-ACNC: 1.68 UIU/ML (ref 0.38–5.33)

## 2023-04-27 PROCEDURE — 99213 OFFICE O/P EST LOW 20 MIN: CPT | Performed by: NURSE PRACTITIONER

## 2023-04-27 PROCEDURE — 86141 C-REACTIVE PROTEIN HS: CPT

## 2023-04-27 PROCEDURE — 83704 LIPOPROTEIN BLD QUAN PART: CPT

## 2023-04-27 PROCEDURE — 80061 LIPID PANEL: CPT

## 2023-04-27 PROCEDURE — 80053 COMPREHEN METABOLIC PANEL: CPT

## 2023-04-27 PROCEDURE — 82172 ASSAY OF APOLIPOPROTEIN: CPT

## 2023-04-27 PROCEDURE — 84443 ASSAY THYROID STIM HORMONE: CPT

## 2023-04-27 PROCEDURE — 84153 ASSAY OF PSA TOTAL: CPT

## 2023-04-27 ASSESSMENT — FIBROSIS 4 INDEX: FIB4 SCORE: 1.39

## 2023-04-27 NOTE — PROGRESS NOTES
Chief Complaint   Patient presents with    Follow-Up     GONZALEZ: Last seen 10/26/22 VIVIANE Izquierdo       HPI:      Mr. Dobbins is a 67 y/o male patient who is in today for GONZALEZ f/u. PMH includes asthma, psoriasis, history of stroke from vertebral artery dissection, mixed hyperlipidemia with apolipoprotein A3 variant, RBBB, Agaston CAC score >400, BPH, AAA, hypertension, arthrosclerosis of bilateral carotid arteries, hypothyroid, GONZALEZ, ED, light cigar smoker.    Patient was set up with a ResMed auto CPAP machine on 9/19/2022. Compliance report from 3/28/2023 - 4/26/2023 was downloaded and reviewed with the patient which showed auto CPAP 4-7 cmH2O, 100% compliance, 9 hrs 24 min use, AHI of 1.6, mild mask leak 16.8 L/min. He is tolerating the pressure and mask well, but did have some mild irritation under the nose which he attributed to the mustache.  He trended slightly and this resolved the issue.  He does not receive new mask cushions monthly nor filters from [facility] and will reach out to inquire about this.  He denies morning headache or snoring since being on CPAP therapy. He goes to bed between 9-10 pm and wakes up at 7 am.  Since being on CPAP therapy has noted that he sleeps much better and is more rested.  He continues to follow-up with Dr. Bloch.  Patient is exercising on his elliptical daily for an hour and also goes to the gym during the day where he lifts weights.  He denies any new health problems or medications.    Sleep/Card Study History:   PSG study from 6/1/22 indicated Mild OAS with AHI of 9.2/hr and O2 yoel 82 %. AHI during rem was 22.5 and AHI while supine was 10.56. The patient spent 12.8 minutes of TST with SaO2 <88%.     NM Cardiac Stress test from 4/15/22 indicated No evidence of significant jeopardized viable myocardium or prior myocardial infarction. Normal left ventricular size, ejection fraction, and wall motion. LVEF 65%.  ST Depression during lexiscan administration which could be compatible  Stable. Observe. with ischemia, but non-specific given RBBB. ECG INTERPRETATION abnormal ECG response to lexiscan.       ROS:    Constitutional: Denies fevers, Denies weight changes  Eyes: Denies changes in vision, no eye pain  Ears/Nose/Throat/Mouth: Denies nasal congestion or sore throat   Cardiovascular: Denies chest pain or palpitations   Respiratory: Denies shortness of breath , Denies cough  Gastrointestinal/Hepatic: Denies abdominal pain, nausea, vomiting,   Skin/Breast: Denies rash,   Neurological: Denies headache, confusion,   Psychiatric: denies mood disorder   Sleep: denies snoring, morning headache      Past Medical History:   Diagnosis Date    ASTHMA     mild intermittent    Chickenpox     Family history of hypertension     History of stroke     HTN (hypertension) 2011    Hyperlipidemia     Hypothyroid 07/01/2016    Obstructive sleep apnea 6/13/2022    Psoriasis     Stroke (HCC) 1997    Vertebral artery disection       Past Surgical History:   Procedure Laterality Date    OTHER  age 12     nasal polyps       Family History   Problem Relation Age of Onset    Hypertension Mother     Other Father 45        Viral encephalitis    Sleep Apnea Neg Hx        Social History     Socioeconomic History    Marital status:      Spouse name: Jada    Number of children: 3    Years of education: 24    Highest education level: Not on file   Occupational History    Occupation: Orthopedic Surgeon   Tobacco Use    Smoking status: Light Smoker     Types: Cigars    Smokeless tobacco: Never    Tobacco comments:     1 cigar per week   Vaping Use    Vaping Use: Never used   Substance and Sexual Activity    Alcohol use: Yes     Alcohol/week: 8.4 oz     Types: 14 Standard drinks or equivalent per week     Comment: 2-3 drinks a week    Drug use: No    Sexual activity: Yes     Partners: Female   Other Topics Concern    Not on file   Social History Narrative    Not on file     Social Determinants of Health     Financial Resource Strain: Not  on file   Food Insecurity: Not on file   Transportation Needs: Not on file   Physical Activity: Not on file   Stress: Not on file   Social Connections: Not on file   Intimate Partner Violence: Not on file   Housing Stability: Not on file       Allergies as of 04/27/2023    (No Known Allergies)        Vitals:  Vitals:    04/27/23 1001   BP: 116/76   Pulse: (!) 54   Resp: 16   SpO2: 97%       Current medications as of today   Current Outpatient Medications   Medication Sig Dispense Refill    benazepril (LOTENSIN) 10 MG Tab Take 1 Tablet by mouth every day. 90 Tablet 3    rosuvastatin (CRESTOR) 20 MG Tab TAKE 1 TABLET BY MOUTH EVERY EVENING 90 Tablet 3    calcipotriene (DOVONEX) 0.005 % Cream APPLY 2 GRAMS EXTERNALLY TO THE AFFECTED AREA TWICE DAILY 120 g 6    SYNTHROID 125 MCG Tab Take 1 Tablet by mouth every morning on an empty stomach. 90 Tablet 3    tadalafil (CIALIS) 20 MG tablet Take 1 Tablet by mouth 1 time a day as needed for Erectile Dysfunction. 20 Tablet 3    albuterol 108 (90 Base) MCG/ACT Aero Soln inhalation aerosol Inhale 2 Puffs by mouth every 6 hours as needed. 8.5 g 5    fluticasone (FLOVENT HFA) 110 MCG/ACT Aerosol Inhale 1 Puff by mouth 2 Times a Day. 1 Inhaler 3    B Complex Cap Take 1 Cap by mouth every day. 100 Cap 3    Cholecalciferol (VITAMIN D) 2000 UNITS Cap Take 1 Cap by mouth every day.      aspirin EC (ECOTRIN) 81 MG Tablet Delayed Response Take 81 mg by mouth every day.       No current facility-administered medications for this visit.         Physical Exam:   Appearance: Well developed, well nourished, no acute distress  Eyes: PERRL, EOM intact, sclera white, conjunctiva moist  Ears: no lesions or deformities  Hearing: grossly intact  Nose: no lesions or deformities  Respiratory effort: no intercostal retractions or use of accessory muscles  Extremities: no cyanosis or edema  Abdomen: soft   Gait and Station: normal  Digits and nails: no clubbing, cyanosis, petechiae or nodes.  Cranial  nerves: grossly intact  Skin: no visible rashes, lesions or ulcers noted  Orientation: Oriented to time, person and place  Mood and affect: mood and affect appropriate, normal interaction with examiner  Judgement: Intact    Assessment:  1. GONZALEZ (obstructive sleep apnea)  DME Mask and Supplies      2. RBBB        3. Hypertension, unspecified type        4. History of stroke from Vertebral Artery Dissection        5. Mixed hyperlipidemia with apolipoprotein E3 variant        6. Atherosclerosis of both carotid arteries, mild (US done April 2022)        7. Agatston CAC score, >400 (April 2022 total 587.1)              Plan  Discussed the cardiovascular and neuropsychiatric risks of untreated GONZALEZ; including but not limited to: HTN, DM, MI, ASCVD, CVA, CHF, traffic accidents.     1. Compliance report from 3/28/2023 - 4/26/2023 was downloaded and reviewed with the patient which showed auto CPAP 4-7 cmH2O, 100% compliance, 9 hrs 24 min use, AHI of 1.6, mild mask leak 16.8 L/min.  Patient is compliant and benefiting from autoCPAP therapy for management of GONZALEZ. Advised patient to continue to use the autoCPAP every night for more than four hours for optimal health benefit.    *DME order ([facility]) for mask (medium dreamwear nasal mask or MOC) and supplies was placed today. Continue to clean mask and supplies weekly with soap and water, and change supplies per insurance guidelines.     *Sleep hygiene discussed. Recommend keeping a set sleep/wake schedule. Logging enough hours of sleep. Limiting/Avoiding naps. No caffeine after noon and no heavy meals in the evening.     2-7. Continue to f/u with  Dr. Bloch and PCP.   8. Continue to stay active.   9. Follow up with appropriate healthcare providers for all other medical problems.  10. F/u in 1 year for GONZALEZ, sooner if needed.       VITOR Vasquez.      This dictation was created using voice recognition software. The accuracy of the dictation is limited to the abilities of the  software. I expect there may be some errors of grammar and possibly content.

## 2023-04-27 NOTE — PROGRESS NOTES
Adal Dobbins is a 68 y.o. male here for a non-provider visit for lab draw    If abnormal was an in office provider notified today (if so, indicate provider)? No    Routed to PCP? No

## 2023-04-29 LAB — APO B100 SERPL-MCNC: 76 MG/DL (ref 55–140)

## 2023-05-01 LAB
CHOLEST SERPL-MCNC: 155 MG/DL
HDL PARTICAL NO Q4363: 33.2 UMOL/L
HDL SIZE Q4361: 9.3 NM
HDLC SERPL-MCNC: 60 MG/DL (ref 40–59)
HLD.LARGE SERPL-SCNC: 8 UMOL/L
L VLDL PART NO Q4357: <1.5 NMOL/L
LDL SERPL QN: 20.9 NM
LDL SERPL-SCNC: 1097 NMOL/L
LDL SMALL SERPL-SCNC: 366 NMOL/L
LDLC SERPL CALC-MCNC: 81 MG/DL
PATHOLOGY STUDY: ABNORMAL
TRIGL SERPL-MCNC: 70 MG/DL (ref 30–149)
VLDL SIZE Q4362: 44.1 NM

## 2023-05-08 ENCOUNTER — RESEARCH ENCOUNTER (OUTPATIENT)
Dept: VASCULAR LAB | Facility: MEDICAL CENTER | Age: 68
End: 2023-05-08
Attending: INTERNAL MEDICINE
Payer: MEDICARE

## 2023-05-08 VITALS
DIASTOLIC BLOOD PRESSURE: 67 MMHG | HEART RATE: 55 BPM | SYSTOLIC BLOOD PRESSURE: 116 MMHG | HEIGHT: 68 IN | WEIGHT: 165 LBS | BODY MASS INDEX: 25.01 KG/M2

## 2023-05-08 DIAGNOSIS — E78.2 MIXED HYPERLIPIDEMIA WITH APOLIPOPROTEIN E3 VARIANT: ICD-10-CM

## 2023-05-08 DIAGNOSIS — E78.41 ELEVATED LP(A): ICD-10-CM

## 2023-05-08 DIAGNOSIS — E03.9 HYPOTHYROIDISM, UNSPECIFIED TYPE: ICD-10-CM

## 2023-05-08 DIAGNOSIS — Z00.6 RESEARCH STUDY PATIENT: ICD-10-CM

## 2023-05-08 DIAGNOSIS — R93.1 AGATSTON CAC SCORE, >400: ICD-10-CM

## 2023-05-08 PROCEDURE — 99212 OFFICE O/P EST SF 10 MIN: CPT

## 2023-05-08 PROCEDURE — 99214 OFFICE O/P EST MOD 30 MIN: CPT | Performed by: INTERNAL MEDICINE

## 2023-05-08 RX ORDER — EZETIMIBE 10 MG/1
10 TABLET ORAL DAILY
Qty: 30 TABLET | Refills: 11 | Status: SHIPPED | OUTPATIENT
Start: 2023-05-08 | End: 2023-05-10 | Stop reason: SDUPTHER

## 2023-05-08 ASSESSMENT — FIBROSIS 4 INDEX: FIB4 SCORE: 1.47

## 2023-05-08 NOTE — PROGRESS NOTES
"[facility] - follow up  Visit  Date of Service: 05/08/23    Patient here for f/u of dyslipidemia    Subjective    HPI  History of ASCVD: No  Other Established (non-atherosclerotic) Vascular Disease, if Present: None  Age at Initial Diagnosis of Dyslipidemia: 50s    Current Prescription Lipid Lowering Medications - including dose:   Statin: Rosuvastatin 20 mg  Non-Statin: None  Current Lipid Lowering and Related Supplements:   Aspirin 81 mg a day  Vitamin D  Any Current Side Effects Potentially Related to Lipid Lowering therapy?   No  Current Adherence to Lipid Lowering Therapies   Complete  Previously Attempted Interventions for Lipids - including outcome  Statin: None    Outcome: N/A  Non-Statin: None   Outcome: N/A  Any Previous History of Statin Intolerance?   No  Baseline Lipids Prior to Treatment:   Unknown or unavailable - only on treatment blood work available  Other Pertinent History:   Excellent exercise tolerance  No angina  Remains on same dose levothyroxine -good energy level  History of other CV risk factors:   He is down to 5 mg of benazepril a day  Office readings and well below 130/80  History of cigarette and cigar smoking - none recently  No fam history of premature ASCVD    FAMILY HISTORY: High cholesterol and high bp but no ascvd in first degree relatives on mothers side. Does not know fathers histoyr    SOCIAL HISTORY   Social History     Tobacco Use   Smoking Status Light Smoker    Types: Cigars   Smokeless Tobacco Never   Tobacco Comments    1 cigar per week     Change in weight: Has maintained his weight loss  Exercise habits: strenuous regular exercise program  Diet: essentially plant based with limited processed foods        Objective      Vitals:    05/08/23 1348   BP: 116/67   BP Location: Left arm   Patient Position: Sitting   BP Cuff Size: Adult   Pulse: (!) 55   Weight: 74.8 kg (165 lb)   Height: 1.727 m (5' 8\")      Physical Exam  Constitutional:       General: He is not in " acute distress.     Appearance: He is well-developed. He is not diaphoretic.   HENT:      Head: Normocephalic and atraumatic.   Eyes:      General: No scleral icterus.     Extraocular Movements: Extraocular movements intact.      Conjunctiva/sclera: Conjunctivae normal.   Neck:      Thyroid: No thyromegaly.      Vascular: No JVD.   Pulmonary:      Effort: Pulmonary effort is normal. No respiratory distress.   Skin:     Coloration: Skin is not pale.   Neurological:      General: No focal deficit present.      Mental Status: He is alert and oriented to person, place, and time.      Cranial Nerves: No cranial nerve deficit.      Coordination: Coordination normal.      Gait: Gait normal.   Psychiatric:         Mood and Affect: Mood normal.         Behavior: Behavior normal.       DATA REVIEW:  Most Recent Lipid Panel:   Lab Results   Component Value Date    CHOLSTRLTOT 155 04/27/2023    CHOLSTRLTOT 152 04/27/2023    TRIGLYCERIDE 70 04/27/2023    TRIGLYCERIDE 63 04/27/2023    HDL 60 (H) 04/27/2023    HDL 54 04/27/2023    LDL 85 04/27/2023   LDL C 85, 81  LDLP 1097  apoB 76  Lp(a) 33    Other Pertinent Blood Work:   Lab Results   Component Value Date    SODIUM 141 04/27/2023    POTASSIUM 4.3 04/27/2023    CHLORIDE 109 04/27/2023    CO2 24 04/27/2023    ANION 8.0 04/27/2023    GLUCOSE 83 04/27/2023    BUN 26 (H) 04/27/2023    CREATININE 0.69 04/27/2023    CALCIUM 9.1 04/27/2023    ASTSGOT 21 04/27/2023    ALTSGPT 17 04/27/2023    ALKPHOSPHAT 41 04/27/2023    TBILIRUBIN 0.6 04/27/2023    ALBUMIN 3.9 04/27/2023    AGRATIO 1.6 04/27/2023    LIPOPROTA 33 (H) 04/14/2022    TSHULTRASEN 1.680 04/27/2023    CPKTOTAL 223 (H) 03/29/2023       CAC 2014  LMA - 0.0   LCX - 0.0   LAD - 102.8   RCA - 0.0   PDA - 30.5  Calcium Score:  133.3     vasc screen and cimt 2017  Mild carotid plaque  No aaa  Normal erin  Mean cimt 0.719  vasc age 62    Stress Echo 2018  Negative stress echocardiogram for ischemia with adequate stress achieved by    heart rate criteria.     vasc screen and cimt 2018  Mild carotid plaque  No aaa  Normal erin  Mean cimt 0.719  vasc age 63    CAC score 2022  LMA - 0.0  LCX - 0.0  LAD - 469.2  RCA - 117.9  PDA - 587.1  Total Calcium Score: 587.1    vasc screen and cimt April 2022  Mild carotid plaque  No aaa  Normal erin  Mean cimt 0.758  vasc age 67    MPI april 2022   No evidence of significant jeopardized viable myocardium or prior myocardial    infarction.   Normal left ventricular size, ejection fraction, and wall motion.    CIMT and vasc screen april 2023   Moderate plaque of the carotid bifurcation with velocities consistent with less than 50% stenosis of the internal carotid.    No aneurysm of the abdominal aorta.    ABIs are increased bilaterally consistent with vessel hardening, limiting evaluation.  Composite mean C IMT 0.736  Vascular age 64        ASSESSMENT AND PLAN  Patient Type, check all that apply:   Primary Prevention  Established Atherosclerotic Cardiovascular Disease (ASCVD)  Subclinical ASCVD as evidenced by his elevated coronary calcium score, elevated C IMT, and mild carotid atheroma seen on vascular screen.  As we previously discussed at length, coronary calcium scores are not meant to be followed serially and his worsening in coronary calcium score between 2014 and 2022 is as likely to be the result of calcification and stabilization of pre-existing plaque rather than progression of atherosclerosis.  CMT and vascular screen are a much better way to follow progression and both of them show reasonable stabilization and relatively low overall atheroma burden  Overall, I think picture is most consistent with the development of early atherosclerosis in his 40s and 50s with stabilization of existing plaque with better lifestyle and lipid-lowering therapy over the last 5 to 10 years  There is no evidence of ischemia on MPI and by symptoms so I do not think there is any reason to proceed with coronary CTA unless  he develops symptoms.  This is an indication for aggressive medical management and we can follow his progression with C IMT and vascular screen serially.  I would not recommend he gets another coronary artery calcium score  Other Established (non-atherosclerotic) Vascular Disease, if Present:  None  Evidence of Heterozygous Familial Hypercholesterolemia (FH):   No   ACC/AHA Indication for Statin Therapy, jose all that apply:  Baseline Calculated ASCVD risk >7.5%: Indication for Moderate intensity statin  Calculated Risk for ASCVD, if applicable    9.7 % - likely an underestimation given subclinical ascvd  Other Significant Risk Markers, if any, jose all that apply   Subclinical atherosclerosis on CAC scoring  Subclinical atherosclerosis on vascular screen and C IMT -but stable over time  Mildly elevated lipoprotein a  Favorable triglycerides, HDL, and LDL particle size  Favorable CRP  Treatment goals based on shared decision making  Using shared decision making we decided to treat to aggressively and target an LDL C less than 70, APO B less than 60, and LDL particle number at least less than 900 -he was very near all of these goals last year but has worsened a bit on most recent blood work  Lifestyle Recommendations From Today’s Visit:    Continue regular exercise program -although perhaps had some more yoga Pilates or other low impact full body resistance training  Continue essentially plant-based diet with limited processed foods  Statin Therapy Recommendations from Today’s Visit:   Continue rosuvastatin to 20 mg daily  Non-Statin Medications Recommendations from Today’s Visit:   Add ezetimibe 10 mg daily  Indication for PCSK9 Inhibitor, if applicable:  Not currently indicated  Supplements Recommended at this visit:   Continue vitamin D which may help with statin tolerability  Recommendations for Other Cardiovascular Risk Factors, jose all that apply:   -Hypertension -under excellent control both in the office and  at home despite decreasing doses of benazepril.  Defer management to PCP  -History of smoking -recommend continued avoidance of all tobacco products  -Antiplatelet therapy -reasonable to continue low-dose aspirin given his subclinical CAD and low bleeding risk  Other Issues:  - hypothyroidism -asymptomatic.  Seems under reasonable control per symptoms and most recent TSH.  Continue current dose levothyroxine.  Otherwise follow-up with PCP    Future Studies: Repeat cimt and vascular screen april 2023  Blood Work Ordered At Today’s visit: NMR, apoB, crp, cmp, Tsh  Follow-Up: may 2023     Time: 37 min - chart review/prep, review of other providers' records, imaging/lab review, face-to-face time for history/examination, ordering, prescribing,  review of results/meds/ treatment plan with patient/family/caregiver, documentation in EMR, care coordination (as needed)    Michael J Bloch, M.D.    CC:  DARIA Steen M.D.

## 2023-05-08 NOTE — RESEARCH NOTE
[facility] [place] [facility] enrollment complete. Saliva sample collected onsite. LANDIN Identified consented and enrolled.

## 2023-05-10 DIAGNOSIS — E78.2 MIXED HYPERLIPIDEMIA WITH APOLIPOPROTEIN E3 VARIANT: ICD-10-CM

## 2023-05-10 RX ORDER — EZETIMIBE 10 MG/1
10 TABLET ORAL DAILY
Qty: 30 TABLET | Refills: 11 | Status: SHIPPED | OUTPATIENT
Start: 2023-05-10

## 2023-05-30 DIAGNOSIS — E78.2 MIXED HYPERLIPIDEMIA WITH APOLIPOPROTEIN E3 VARIANT: ICD-10-CM

## 2023-05-30 RX ORDER — ROSUVASTATIN CALCIUM 20 MG/1
20 TABLET, COATED ORAL EVERY EVENING
Qty: 90 TABLET | Refills: 3 | Status: SHIPPED | OUTPATIENT
Start: 2023-05-30

## 2023-06-14 LAB
APOB+LDLR+PCSK9 GENE MUT ANL BLD/T: NOT DETECTED
BRCA1+BRCA2 DEL+DUP + FULL MUT ANL BLD/T: NOT DETECTED
MLH1+MSH2+MSH6+PMS2 GN DEL+DUP+FUL M: NOT DETECTED

## 2023-06-26 RX ORDER — BENAZEPRIL HYDROCHLORIDE 10 MG/1
10 TABLET ORAL DAILY
Qty: 90 TABLET | Refills: 3 | Status: SHIPPED | OUTPATIENT
Start: 2023-06-26

## 2023-06-29 ENCOUNTER — APPOINTMENT (RX ONLY)
Dept: URBAN - METROPOLITAN AREA CLINIC 35 | Facility: CLINIC | Age: 68
Setting detail: DERMATOLOGY
End: 2023-06-29

## 2023-06-29 DIAGNOSIS — Z41.9 ENCOUNTER FOR PROCEDURE FOR PURPOSES OTHER THAN REMEDYING HEALTH STATE, UNSPECIFIED: ICD-10-CM

## 2023-06-29 PROCEDURE — ? FRAXEL RESTORE DUAL

## 2023-06-29 ASSESSMENT — LOCATION SIMPLE DESCRIPTION DERM: LOCATION SIMPLE: LEFT FOREHEAD

## 2023-06-29 ASSESSMENT — LOCATION ZONE DERM: LOCATION ZONE: FACE

## 2023-06-29 ASSESSMENT — LOCATION DETAILED DESCRIPTION DERM: LOCATION DETAILED: LEFT INFERIOR MEDIAL FOREHEAD

## 2023-06-29 NOTE — PROCEDURE: FRAXEL RESTORE DUAL
Topical Anesthesia?: 23% lidocaine, 7% tetracaine
Pre-Procedure Text: 1.5 hours numbing
Length Topical Anesthesia Applied (Optional): 30 minutes
Post-Care Instructions: I reviewed with the patient in detail post-care instructions.
Passes (Optional): 8
Actual Kj Used (Optional): 3.51
Treatment Level (Optional): 9
Treatment Number (Optional): 21
Eye Shielding Text (Leave Blank If Unwanted- Will Be Inserted If Selecting Eye Shields): The intraocular eye shields were placed. 2 drops of intraocular tetracaine HCL ophthalmic 0.5% solution was administered. The eye shields were coated with ophthalmic bacitracin prior to insertion. After the shields were removed the eyes were flushed with normal saline.
Consent: Written consent obtained, risks reviewed including but not limited to crusting, scabbing, blistering, scarring, darker or lighter pigmentary change, and/or incomplete removal.
Detail Level: Zone
Energy In Mj (Optional): 20
Price (Use Numbers Only, No Special Characters Or $): 8958

## 2023-07-19 ENCOUNTER — APPOINTMENT (RX ONLY)
Dept: URBAN - METROPOLITAN AREA CLINIC 35 | Facility: CLINIC | Age: 68
Setting detail: DERMATOLOGY
End: 2023-07-19

## 2023-07-19 DIAGNOSIS — Z41.9 ENCOUNTER FOR PROCEDURE FOR PURPOSES OTHER THAN REMEDYING HEALTH STATE, UNSPECIFIED: ICD-10-CM

## 2023-07-19 PROCEDURE — ? BOTOX

## 2023-07-19 PROCEDURE — ? ADDITIONAL NOTES

## 2023-07-19 NOTE — PROCEDURE: BOTOX
Right Periorbital Skin Units: 0
Consent: Verbal and written informed consent were obtained to include the following risks: pain, swelling, bruising, eyelid or eyebrow droop, and lack of visible improvement of wrinkles in the areas treated.  The skin was cleansed with alcohol. Injections were administered with a 32g needle into the following areas:
Additional Area 3 Units: 26
Dilution (U/0.1 Cc): 1.1
Additional Area 3 Location: Frontalis
Lot #: [id number]
Detail Level: Detailed
Additional Area 2 Units: 73
Additional Area 5 Location: perioral
Additional Area 1 Location: Glabella
Expiration Date (Month Year): 2025-12
Additional Area 6 Location: platysma
Price (Use Numbers Only, No Special Characters Or $): 1375
Post-Care Instructions: Patient instructed to not lie down for 4 hours after injections and limit physical activity for 24 hours.
Additional Area 4 Location: Bunny lines
Additional Area 2 Location: Crows Feet

## 2023-08-17 DIAGNOSIS — E03.9 HYPOTHYROIDISM (ACQUIRED): ICD-10-CM

## 2023-08-17 RX ORDER — LEVOTHYROXINE SODIUM 125 MCG
125 TABLET ORAL
Qty: 90 TABLET | Refills: 3 | Status: SHIPPED | OUTPATIENT
Start: 2023-08-17

## 2023-10-04 ENCOUNTER — APPOINTMENT (RX ONLY)
Dept: URBAN - METROPOLITAN AREA CLINIC 35 | Facility: CLINIC | Age: 68
Setting detail: DERMATOLOGY
End: 2023-10-04

## 2023-10-04 DIAGNOSIS — Z41.9 ENCOUNTER FOR PROCEDURE FOR PURPOSES OTHER THAN REMEDYING HEALTH STATE, UNSPECIFIED: ICD-10-CM

## 2023-10-04 PROCEDURE — ? FRAXEL RESTORE DUAL

## 2023-10-04 ASSESSMENT — LOCATION DETAILED DESCRIPTION DERM: LOCATION DETAILED: LEFT INFERIOR MEDIAL FOREHEAD

## 2023-10-04 ASSESSMENT — LOCATION ZONE DERM: LOCATION ZONE: FACE

## 2023-10-04 ASSESSMENT — LOCATION SIMPLE DESCRIPTION DERM: LOCATION SIMPLE: LEFT FOREHEAD

## 2023-10-04 NOTE — PROCEDURE: FRAXEL RESTORE DUAL
Topical Anesthesia?: 23% lidocaine, 7% tetracaine
Pre-Procedure Text: 1.5 hours numbing
Length Topical Anesthesia Applied (Optional): 30 minutes
Post-Care Instructions: I reviewed with the patient in detail post-care instructions.
Passes (Optional): 8
Actual Kj Used (Optional): 3.51
Treatment Level (Optional): 9
Treatment Number (Optional): 22
Eye Shielding Text (Leave Blank If Unwanted- Will Be Inserted If Selecting Eye Shields): The intraocular eye shields were placed. 2 drops of intraocular tetracaine HCL ophthalmic 0.5% solution was administered. The eye shields were coated with ophthalmic bacitracin prior to insertion. After the shields were removed the eyes were flushed with normal saline.
Consent: Written consent obtained, risks reviewed including but not limited to crusting, scabbing, blistering, scarring, darker or lighter pigmentary change, and/or incomplete removal.
Detail Level: Zone
Energy In Mj (Optional): 20
Price (Use Numbers Only, No Special Characters Or $): 4225

## 2023-10-04 NOTE — HPI: COSMETIC (LASER RESURFACING)
Have You Had Laser Resurfacing Before?: has had previous treatments
When Was Your Last Laser Resurfacing Treatment?: 06/29/2023

## 2023-10-17 ENCOUNTER — APPOINTMENT (RX ONLY)
Dept: URBAN - METROPOLITAN AREA CLINIC 35 | Facility: CLINIC | Age: 68
Setting detail: DERMATOLOGY
End: 2023-10-17

## 2023-10-17 DIAGNOSIS — Z41.9 ENCOUNTER FOR PROCEDURE FOR PURPOSES OTHER THAN REMEDYING HEALTH STATE, UNSPECIFIED: ICD-10-CM

## 2023-10-17 PROCEDURE — ? BOTOX

## 2023-10-17 PROCEDURE — ? ADDITIONAL NOTES

## 2023-10-17 NOTE — PROCEDURE: BOTOX
Right Periorbital Skin Units: 0
Consent: Verbal and written informed consent were obtained to include the following risks: pain, swelling, bruising, eyelid or eyebrow droop, and lack of visible improvement of wrinkles in the areas treated.  The skin was cleansed with alcohol. Injections were administered with a 32g needle into the following areas:
Additional Area 3 Units: 26
Dilution (U/0.1 Cc): 1.1
Additional Area 3 Location: Frontalis
Lot #: [id number]
Detail Level: Detailed
Additional Area 2 Units: 73
Additional Area 5 Location: perioral
Additional Area 1 Location: Glabella
Expiration Date (Month Year): 2025-05
Additional Area 6 Location: platysma
Price (Use Numbers Only, No Special Characters Or $): 1159
Post-Care Instructions: Patient instructed to not lie down for 4 hours after injections and limit physical activity for 24 hours.
Additional Area 4 Location: Bunny lines
Additional Area 2 Location: Crows Feet

## 2023-10-30 ENCOUNTER — OFFICE VISIT (OUTPATIENT)
Dept: INTERNAL MEDICINE | Facility: IMAGING CENTER | Age: 68
End: 2023-10-30
Payer: MEDICARE

## 2023-10-30 VITALS
TEMPERATURE: 98.1 F | BODY MASS INDEX: 25.54 KG/M2 | WEIGHT: 168 LBS | OXYGEN SATURATION: 99 % | SYSTOLIC BLOOD PRESSURE: 90 MMHG | DIASTOLIC BLOOD PRESSURE: 60 MMHG | RESPIRATION RATE: 12 BRPM | HEART RATE: 54 BPM

## 2023-10-30 DIAGNOSIS — Z23 NEED FOR INFLUENZA VACCINATION: ICD-10-CM

## 2023-10-30 DIAGNOSIS — R35.1 BENIGN PROSTATIC HYPERPLASIA WITH NOCTURIA: ICD-10-CM

## 2023-10-30 DIAGNOSIS — N40.1 BENIGN PROSTATIC HYPERPLASIA WITH NOCTURIA: ICD-10-CM

## 2023-10-30 DIAGNOSIS — I10 ESSENTIAL HYPERTENSION: ICD-10-CM

## 2023-10-30 DIAGNOSIS — G47.33 OBSTRUCTIVE SLEEP APNEA: ICD-10-CM

## 2023-10-30 DIAGNOSIS — E78.00 HYPERCHOLESTEROLEMIA: ICD-10-CM

## 2023-10-30 DIAGNOSIS — E03.9 HYPOTHYROIDISM (ACQUIRED): ICD-10-CM

## 2023-10-30 DIAGNOSIS — R97.20 ELEVATED PSA: ICD-10-CM

## 2023-10-30 PROCEDURE — G0008 ADMIN INFLUENZA VIRUS VAC: HCPCS | Performed by: INTERNAL MEDICINE

## 2023-10-30 PROCEDURE — 3074F SYST BP LT 130 MM HG: CPT | Performed by: INTERNAL MEDICINE

## 2023-10-30 PROCEDURE — 3078F DIAST BP <80 MM HG: CPT | Performed by: INTERNAL MEDICINE

## 2023-10-30 PROCEDURE — 99213 OFFICE O/P EST LOW 20 MIN: CPT | Mod: 25 | Performed by: INTERNAL MEDICINE

## 2023-10-30 PROCEDURE — 90662 IIV NO PRSV INCREASED AG IM: CPT | Performed by: INTERNAL MEDICINE

## 2023-10-30 ASSESSMENT — FIBROSIS 4 INDEX: FIB4 SCORE: 1.47

## 2023-10-30 NOTE — PROGRESS NOTES
Chief Complaint   Patient presents with    Follow-Up    Hypertension    Hyperlipidemia    Elevated PSA       HISTORY OF THE PRESENT ILLNESS: Patient is a 68 y.o. male.     Patient comes in for follow-up on chronic issues.  He has been feeling well.  He remains active.  Since our last visit he had shoulder issues which resolved with physical therapy and injection.  He was also seen for sleep apnea which is stable on CPAP.    Discussed his hypertension.  His blood pressure is low today with a systolic pressure of 90.  He remains on Lotensin 10 mg.  No orthostatic or other symptoms.  He is scheduled to follow-up with Dr. Bloch.    Hyperlipidemia-stable on combination Zetia and statin.  Lab Results   Component Value Date/Time    CHOLSTRLTOT 155 04/27/2023 07:40 AM    CHOLSTRLTOT 152 04/27/2023 07:40 AM    LDL 85 04/27/2023 07:40 AM    HDL 60 (H) 04/27/2023 07:40 AM    HDL 54 04/27/2023 07:40 AM    TRIGLYCERIDE 70 04/27/2023 07:40 AM    TRIGLYCERIDE 63 04/27/2023 07:40 AM       We discussed his elevated PSA.  This improved with retesting.  He was seen by urology.  He has a history of BPH.  He experiences some mild nocturia but no other symptoms.    Allergies: Patient has no known allergies.    Current Outpatient Medications Ordered in Epic   Medication Sig Dispense Refill    SYNTHROID 125 MCG Tab Take 1 Tablet by mouth every morning on an empty stomach. 90 Tablet 3    benazepril (LOTENSIN) 10 MG Tab Take 1 Tablet by mouth every day. 90 Tablet 3    rosuvastatin (CRESTOR) 20 MG Tab Take 1 Tablet by mouth every evening. 90 Tablet 3    ezetimibe (ZETIA) 10 MG Tab Take 1 Tablet by mouth every day. 30 Tablet 11    calcipotriene (DOVONEX) 0.005 % Cream APPLY 2 GRAMS EXTERNALLY TO THE AFFECTED AREA TWICE DAILY 120 g 6    tadalafil (CIALIS) 20 MG tablet Take 1 Tablet by mouth 1 time a day as needed for Erectile Dysfunction. 20 Tablet 3    albuterol 108 (90 Base) MCG/ACT Aero Soln inhalation aerosol Inhale 2 Puffs by mouth every 6  hours as needed. 8.5 g 5    fluticasone (FLOVENT HFA) 110 MCG/ACT Aerosol Inhale 1 Puff by mouth 2 Times a Day. 1 Inhaler 3    B Complex Cap Take 1 Cap by mouth every day. 100 Cap 3    Cholecalciferol (VITAMIN D) 2000 UNITS Cap Take 1 Cap by mouth every day. (Patient not taking: Reported on 5/8/2023)      aspirin EC (ECOTRIN) 81 MG Tablet Delayed Response Take 81 mg by mouth every day.       No current [facility]-ordered facility-administered medications on file.       Past medical history, social history and family history were reviewed from chart today    Review of systems: Per HPI.    All others negative.     Exam: BP 90/60 (BP Location: Left arm, Patient Position: Sitting, BP Cuff Size: Adult)   Pulse (!) 54   Temp 36.7 °C (98.1 °F) (Temporal)   Resp 12   Wt 76.2 kg (168 lb)   SpO2 99%   General: Well-appearing. Well-developed. No signs of distress.  HEENT: Grossly normal. Oral cavity is pink and moist.   Neck: Supple without JVD or bruit.  Pulmonary: Clear with good breath sounds. Normal effort.  Cardiovascular: Regular. Carotid and radial pulses are intact.  Abdomen: Soft, nontender, nondistended. Spleen and liver are not enlarged.  Neurologic: Cranial nerves II through XII are grossly normal, alert and oriented x3      Diagnosis:  1. Benign prostatic hyperplasia with nocturia  PROSTATE SPECIFIC AG DIAGNOSTIC      2. Hypercholesterolemia        3. Essential hypertension        4. Hypothyroidism (acquired)        5. Need for influenza vaccination  INFLUENZA VACCINE, HIGH DOSE (65+ ONLY)      6. Elevated PSA  PROSTATE SPECIFIC AG DIAGNOSTIC      7. Obstructive sleep apnea, mild (2022 polysomnography)          Chronic issues are stable.  No change in medications currently but he will monitor his blood pressure and if his systolic remains low then I recommend discontinuing Lotensin and reevaluation.  His blood pressure may be better due to change in lifestyle including [facility], diet and regular  exercise.    Recheck PSA with upcoming labs.    Influenza vaccination given today.    My total time spent caring for the patient on the day of the encounter was  greater than 20 minutes.   This includes obtaining history, reviewing chart, physical exam, patient education, reviewing outside records, placing orders, interpreting tests and coordinating care.

## 2023-11-01 ENCOUNTER — NON-PROVIDER VISIT (OUTPATIENT)
Dept: INTERNAL MEDICINE | Facility: IMAGING CENTER | Age: 68
End: 2023-11-01
Payer: MEDICARE

## 2023-11-01 ENCOUNTER — HOSPITAL ENCOUNTER (OUTPATIENT)
Facility: MEDICAL CENTER | Age: 68
End: 2023-11-01
Attending: INTERNAL MEDICINE
Payer: MEDICARE

## 2023-11-01 DIAGNOSIS — E78.2 MIXED HYPERLIPIDEMIA WITH APOLIPOPROTEIN E3 VARIANT: ICD-10-CM

## 2023-11-01 DIAGNOSIS — N40.1 BENIGN PROSTATIC HYPERPLASIA WITH NOCTURIA: ICD-10-CM

## 2023-11-01 DIAGNOSIS — E03.9 HYPOTHYROIDISM, UNSPECIFIED TYPE: ICD-10-CM

## 2023-11-01 DIAGNOSIS — E78.41 ELEVATED LP(A): ICD-10-CM

## 2023-11-01 DIAGNOSIS — R35.1 BENIGN PROSTATIC HYPERPLASIA WITH NOCTURIA: ICD-10-CM

## 2023-11-01 DIAGNOSIS — R97.20 ELEVATED PSA: ICD-10-CM

## 2023-11-01 LAB
ALBUMIN SERPL BCP-MCNC: 4.5 G/DL (ref 3.2–4.9)
ALBUMIN/GLOB SERPL: 1.7 G/DL
ALP SERPL-CCNC: 50 U/L (ref 30–99)
ALT SERPL-CCNC: 34 U/L (ref 2–50)
ANION GAP SERPL CALC-SCNC: 8 MMOL/L (ref 7–16)
AST SERPL-CCNC: 40 U/L (ref 12–45)
BILIRUB SERPL-MCNC: 0.5 MG/DL (ref 0.1–1.5)
BUN SERPL-MCNC: 20 MG/DL (ref 8–22)
CALCIUM ALBUM COR SERPL-MCNC: 9.1 MG/DL (ref 8.5–10.5)
CALCIUM SERPL-MCNC: 9.5 MG/DL (ref 8.5–10.5)
CHLORIDE SERPL-SCNC: 105 MMOL/L (ref 96–112)
CO2 SERPL-SCNC: 27 MMOL/L (ref 20–33)
CREAT SERPL-MCNC: 0.81 MG/DL (ref 0.5–1.4)
CRP SERPL HS-MCNC: 1.6 MG/L (ref 0–3)
GFR SERPLBLD CREATININE-BSD FMLA CKD-EPI: 97 ML/MIN/1.73 M 2
GLOBULIN SER CALC-MCNC: 2.6 G/DL (ref 1.9–3.5)
GLUCOSE SERPL-MCNC: 87 MG/DL (ref 65–99)
POTASSIUM SERPL-SCNC: 4.6 MMOL/L (ref 3.6–5.5)
PROT SERPL-MCNC: 7.1 G/DL (ref 6–8.2)
PSA SERPL-MCNC: 4.91 NG/ML (ref 0–4)
SODIUM SERPL-SCNC: 140 MMOL/L (ref 135–145)
T3 SERPL-MCNC: 82.1 NG/DL (ref 60–181)
T4 SERPL-MCNC: 6.6 UG/DL (ref 4–12)
TSH SERPL DL<=0.005 MIU/L-ACNC: 5.02 UIU/ML (ref 0.38–5.33)

## 2023-11-01 PROCEDURE — 84436 ASSAY OF TOTAL THYROXINE: CPT

## 2023-11-01 PROCEDURE — 80061 LIPID PANEL: CPT | Mod: XU

## 2023-11-01 PROCEDURE — 84153 ASSAY OF PSA TOTAL: CPT

## 2023-11-01 PROCEDURE — 86141 C-REACTIVE PROTEIN HS: CPT

## 2023-11-01 PROCEDURE — 80053 COMPREHEN METABOLIC PANEL: CPT

## 2023-11-01 PROCEDURE — 83704 LIPOPROTEIN BLD QUAN PART: CPT

## 2023-11-01 PROCEDURE — 82172 ASSAY OF APOLIPOPROTEIN: CPT

## 2023-11-01 PROCEDURE — 84443 ASSAY THYROID STIM HORMONE: CPT

## 2023-11-01 PROCEDURE — 84480 ASSAY TRIIODOTHYRONINE (T3): CPT

## 2023-11-01 NOTE — PROGRESS NOTES
Adal Dobbins is a 68 y.o. male here for a non-provider visit for a lab draw on 11/1/2023 at 8:12 AM.    Procedure performed:  Venipuncture     Anatomical site:  Left Antecubital Area    Equipment used:  21 g vacutainer     Labs drawn:  Cardiology, PSA    Ordering provider:  Morteza Steen MD, Michael Bloch MD    Lab draw completed by:  Andreina Le R.N.

## 2023-11-03 LAB — APO B100 SERPL-MCNC: 60 MG/DL (ref 66–133)

## 2023-11-05 LAB
CHOLEST SERPL-MCNC: 124 MG/DL
HDL PARTICAL NO Q4363: 32.8 UMOL/L
HDL SIZE Q4361: 9.5 NM
HDLC SERPL-MCNC: 63 MG/DL (ref 40–59)
HLD.LARGE SERPL-SCNC: 9.7 UMOL/L
L VLDL PART NO Q4357: <1.5 NMOL/L
LDL SERPL QN: 20.7 NM
LDL SERPL-SCNC: 746 NMOL/L
LDL SMALL SERPL-SCNC: 266 NMOL/L
LDLC SERPL CALC-MCNC: 49 MG/DL
PATHOLOGY STUDY: ABNORMAL
TRIGL SERPL-MCNC: 62 MG/DL (ref 30–149)
VLDL SIZE Q4362: 44.8 NM

## 2023-11-06 ENCOUNTER — OFFICE VISIT (OUTPATIENT)
Dept: VASCULAR LAB | Facility: MEDICAL CENTER | Age: 68
End: 2023-11-06
Attending: INTERNAL MEDICINE
Payer: MEDICARE

## 2023-11-06 VITALS
BODY MASS INDEX: 25.46 KG/M2 | WEIGHT: 168 LBS | HEIGHT: 68 IN | SYSTOLIC BLOOD PRESSURE: 110 MMHG | DIASTOLIC BLOOD PRESSURE: 61 MMHG | HEART RATE: 52 BPM

## 2023-11-06 DIAGNOSIS — E78.2 MIXED HYPERLIPIDEMIA WITH APOLIPOPROTEIN E3 VARIANT: ICD-10-CM

## 2023-11-06 DIAGNOSIS — E78.41 ELEVATED LP(A): ICD-10-CM

## 2023-11-06 DIAGNOSIS — E03.9 HYPOTHYROIDISM, UNSPECIFIED TYPE: ICD-10-CM

## 2023-11-06 DIAGNOSIS — I10 HYPERTENSION, UNSPECIFIED TYPE: ICD-10-CM

## 2023-11-06 DIAGNOSIS — R93.1 AGATSTON CAC SCORE, >400: ICD-10-CM

## 2023-11-06 PROCEDURE — 3078F DIAST BP <80 MM HG: CPT | Performed by: INTERNAL MEDICINE

## 2023-11-06 PROCEDURE — 3074F SYST BP LT 130 MM HG: CPT | Performed by: INTERNAL MEDICINE

## 2023-11-06 PROCEDURE — 99215 OFFICE O/P EST HI 40 MIN: CPT | Performed by: INTERNAL MEDICINE

## 2023-11-06 PROCEDURE — 99212 OFFICE O/P EST SF 10 MIN: CPT

## 2023-11-06 ASSESSMENT — FIBROSIS 4 INDEX: FIB4 SCORE: 1.99

## 2023-11-06 NOTE — PROGRESS NOTES
"[facility] - follow up  Visit  Date of Service: 11/06/23    Patient here for f/u of dyslipidemia    Subjective    HPI  History of ASCVD: No  Other Established (non-atherosclerotic) Vascular Disease, if Present: None  Age at Initial Diagnosis of Dyslipidemia: 50s    Current Prescription Lipid Lowering Medications - including dose:   Statin: Rosuvastatin 20 mg  Non-Statin: Ezetimibe 10 mg a day  Current Lipid Lowering and Related Supplements:   Aspirin 81 mg a day  Vitamin D  Coq.10  Any Current Side Effects Potentially Related to Lipid Lowering therapy?   No  Current Adherence to Lipid Lowering Therapies   Complete  Previously Attempted Interventions for Lipids - including outcome  Statin: None    Outcome: N/A  Non-Statin: None   Outcome: N/A  Any Previous History of Statin Intolerance?   No  Baseline Lipids Prior to Treatment:   Unknown or unavailable - only on treatment blood work available  Other Pertinent History:   Excellent exercise tolerance  No angina  Remains on same dose levothyroxine -good energy level  History of other CV risk factors:   He is down to 10 mg of benazepril a day  Home readings in the 110s to 120s  History of cigarette and cigar smoking - none recently  No fam history of premature ASCVD    FAMILY HISTORY: High cholesterol and high bp but no ascvd in first degree relatives on mothers side. Does not know fathers histoyr    SOCIAL HISTORY   Social History     Tobacco Use   Smoking Status Light Smoker    Types: Cigars   Smokeless Tobacco Never   Tobacco Comments    1 cigar per week     Change in weight: Has maintained his weight loss  Exercise habits: strenuous regular exercise program  Diet: essentially plant based with limited processed foods        Objective      Vitals:    11/06/23 1317   BP: 110/61   BP Location: Left arm   Patient Position: Sitting   BP Cuff Size: Adult   Pulse: (!) 52   Weight: 76.2 kg (168 lb)   Height: 1.727 m (5' 8\")      Physical Exam  Constitutional:       " General: He is not in acute distress.     Appearance: He is well-developed. He is not diaphoretic.   HENT:      Head: Normocephalic and atraumatic.   Eyes:      General: No scleral icterus.     Extraocular Movements: Extraocular movements intact.      Conjunctiva/sclera: Conjunctivae normal.   Neck:      Thyroid: No thyromegaly.      Vascular: No JVD.   Pulmonary:      Effort: Pulmonary effort is normal. No respiratory distress.   Skin:     Coloration: Skin is not pale.   Neurological:      General: No focal deficit present.      Mental Status: He is alert and oriented to person, place, and time.      Cranial Nerves: No cranial nerve deficit.      Coordination: Coordination normal.      Gait: Gait normal.   Psychiatric:         Mood and Affect: Mood normal.         Behavior: Behavior normal.         DATA REVIEW:  Most Recent Lipid Panel:   Lab Results   Component Value Date    CHOLSTRLTOT 124 11/01/2023    CHOLSTRLTOT 152 04/27/2023    TRIGLYCERIDE 62 11/01/2023    TRIGLYCERIDE 63 04/27/2023    HDL 63 (H) 11/01/2023    HDL 54 04/27/2023    LDL 85 04/27/2023   LDL C 49  LDLP 746  apoB 60  Lp(a) 33    Other Pertinent Blood Work:   Lab Results   Component Value Date    SODIUM 140 11/01/2023    POTASSIUM 4.6 11/01/2023    CHLORIDE 105 11/01/2023    CO2 27 11/01/2023    ANION 8.0 11/01/2023    GLUCOSE 87 11/01/2023    BUN 20 11/01/2023    CREATININE 0.81 11/01/2023    CALCIUM 9.5 11/01/2023    ASTSGOT 40 11/01/2023    ALTSGPT 34 11/01/2023    ALKPHOSPHAT 50 11/01/2023    TBILIRUBIN 0.5 11/01/2023    ALBUMIN 4.5 11/01/2023    AGRATIO 1.7 11/01/2023    CRPHIGHSEN 1.6 11/01/2023    LIPOPROTA 33 (H) 04/14/2022    TSHULTRASEN 5.020 11/01/2023    CPKTOTAL 223 (H) 03/29/2023       CAC 2014  LMA - 0.0   LCX - 0.0   LAD - 102.8   RCA - 0.0   PDA - 30.5  Calcium Score:  133.3     vasc screen and cimt 2017  Mild carotid plaque  No aaa  Normal erin  Mean cimt 0.719  vasc age 62    Stress Echo 2018  Negative stress echocardiogram for  ischemia with adequate stress achieved by   heart rate criteria.     vasc screen and cimt 2018  Mild carotid plaque  No aaa  Normal erin  Mean cimt 0.719  vasc age 63    CAC score 2022  LMA - 0.0  LCX - 0.0  LAD - 469.2  RCA - 117.9  PDA - 587.1  Total Calcium Score: 587.1    vasc screen and cimt April 2022  Mild carotid plaque  No aaa  Normal erin  Mean cimt 0.758  vasc age 67    MPI april 2022   No evidence of significant jeopardized viable myocardium or prior myocardial    infarction.   Normal left ventricular size, ejection fraction, and wall motion.    CIMT and vasc screen april 2023   Moderate plaque of the carotid bifurcation with velocities consistent with less than 50% stenosis of the internal carotid.    No aneurysm of the abdominal aorta.    ABIs are increased bilaterally consistent with vessel hardening, limiting evaluation.  Composite mean C IMT 0.736  Vascular age 64        ASSESSMENT AND PLAN  Patient Type, check all that apply:   Primary Prevention  Established Atherosclerotic Cardiovascular Disease (ASCVD)  Subclinical ASCVD as evidenced by his elevated coronary calcium score, elevated C IMT, and mild carotid atheroma seen on vascular screen.  As we previously discussed at length, coronary calcium scores are not meant to be followed serially and his worsening in coronary calcium score between 2014 and 2022 is as likely to be the result of calcification and stabilization of pre-existing plaque rather than progression of atherosclerosis.  CMT and vascular screen are a much better way to follow progression and both of them show reasonable stabilization and relatively low overall atheroma burden  Overall, I think picture is most consistent with the development of early atherosclerosis in his 40s and 50s with stabilization of existing plaque with better lifestyle and lipid-lowering therapy over the last 5 to 10 years  There is no evidence of ischemia on MPI and by symptoms so I do not think there is any  reason to proceed with coronary CTA unless he develops symptoms.  This is an indication for aggressive medical management and we can follow his progression with C IMT and vascular screen serially.  I would not recommend he gets another coronary artery calcium score  Other Established (non-atherosclerotic) Vascular Disease, if Present:  None  Evidence of Heterozygous Familial Hypercholesterolemia (FH):   No   ACC/AHA Indication for Statin Therapy, jose all that apply:  Baseline Calculated ASCVD risk >7.5%: Indication for Moderate intensity statin  Calculated Risk for ASCVD, if applicable    9.7 % - likely an underestimation given subclinical ascvd  Other Significant Risk Markers, if any, jose all that apply   Subclinical atherosclerosis on CAC scoring  Subclinical atherosclerosis on vascular screen and C IMT -but stable over time  Mildly elevated lipoprotein a  Favorable triglycerides, HDL, and LDL particle size  Favorable CRP  Treatment goals based on shared decision making  Using shared decision making we decided to treat to aggressively and target an LDL C less than 70, APO B less than 60, and LDL particle number at least less than 900 -  Overall much improved control with addition of ezetimibe  Lifestyle Recommendations From Today’s Visit:    Continue regular exercise program -although perhaps had some more yoga Pilates or other low impact full body resistance training  Continue essentially plant-based diet with limited processed foods  Statin Therapy Recommendations from Today’s Visit:   Continue rosuvastatin to 20 mg daily  Non-Statin Medications Recommendations from Today’s Visit:   Continue ezetimibe 10 mg daily  Indication for PCSK9 Inhibitor, if applicable:  Not currently indicated  Supplements Recommended at this visit:   Continue vitamin D and coq.10 to help with statin tolerability  Recommendations for Other Cardiovascular Risk Factors, jose all that apply:   -Hypertension -under excellent control both  in the office and at home despite decreasing doses of benazepril.  Continue benazepril 10 mg daily.  Continue home blood pressure monitoring  -History of smoking -recommend continued avoidance of all tobacco products  -Antiplatelet therapy -reasonable to continue low-dose aspirin given his subclinical CAD and low bleeding risk  Other Issues:  - hypothyroidism -asymptomatic.  Seems under reasonable control per symptoms.  TSH up a bit.  Continue current dose levothyroxine, but be sure to take at least an hour for mealtime.  Recheck TSH T4 with next blood work.  Otherwise defer further management to PCP    Future Studies: Repeat cimt and vascular screen april 2024-ordered today  Blood Work Ordered At Today’s visit: NMR, apoB, crp, cmp, Tsh/T4, vitamin D (at patient request), PSA (at patient request)  Follow-Up: April 2024 after CIMT and blood work    Time: 43 min - chart review/prep, review of other providers' records, imaging/lab review, face-to-face time for history/examination, ordering, prescribing,  review of results/meds/ treatment plan with patient/family/caregiver, documentation in EMR, care coordination (as needed)    Michael J Bloch, M.D.    CC:  DARIA Steen M.D.

## 2023-11-13 DIAGNOSIS — N52.9 ERECTILE DYSFUNCTION, UNSPECIFIED ERECTILE DYSFUNCTION TYPE: ICD-10-CM

## 2023-11-13 RX ORDER — TADALAFIL 20 MG/1
20 TABLET ORAL
Qty: 20 TABLET | Refills: 3 | Status: SHIPPED | OUTPATIENT
Start: 2023-11-13

## 2023-11-15 ENCOUNTER — APPOINTMENT (RX ONLY)
Dept: URBAN - METROPOLITAN AREA CLINIC 35 | Facility: CLINIC | Age: 68
Setting detail: DERMATOLOGY
End: 2023-11-15

## 2023-11-15 DIAGNOSIS — L24 IRRITANT CONTACT DERMATITIS: ICD-10-CM

## 2023-11-15 DIAGNOSIS — L57.0 ACTINIC KERATOSIS: ICD-10-CM

## 2023-11-15 PROBLEM — L24.9 IRRITANT CONTACT DERMATITIS, UNSPECIFIED CAUSE: Status: ACTIVE | Noted: 2023-11-15

## 2023-11-15 PROCEDURE — 17000 DESTRUCT PREMALG LESION: CPT

## 2023-11-15 PROCEDURE — 17003 DESTRUCT PREMALG LES 2-14: CPT

## 2023-11-15 PROCEDURE — ? PRESCRIPTION

## 2023-11-15 PROCEDURE — ? TREATMENT REGIMEN

## 2023-11-15 PROCEDURE — ? COUNSELING

## 2023-11-15 PROCEDURE — ? LIQUID NITROGEN

## 2023-11-15 PROCEDURE — 99213 OFFICE O/P EST LOW 20 MIN: CPT | Mod: 25

## 2023-11-15 RX ORDER — HYDROCORTISONE 25 MG/G
THIN LAYER CREAM TOPICAL BID
Qty: 30 | Refills: 0 | Status: ERX | COMMUNITY
Start: 2023-11-15

## 2023-11-15 RX ADMIN — HYDROCORTISONE THIN LAYER: 25 CREAM TOPICAL at 00:00

## 2023-11-15 ASSESSMENT — LOCATION DETAILED DESCRIPTION DERM
LOCATION DETAILED: RIGHT INFERIOR FOREHEAD
LOCATION DETAILED: RIGHT SUPERIOR HELIX
LOCATION DETAILED: RIGHT CENTRAL TEMPLE
LOCATION DETAILED: RIGHT SUPERIOR PREAURICULAR CHEEK
LOCATION DETAILED: RIGHT FOREHEAD

## 2023-11-15 ASSESSMENT — LOCATION SIMPLE DESCRIPTION DERM
LOCATION SIMPLE: RIGHT EAR
LOCATION SIMPLE: RIGHT CHEEK
LOCATION SIMPLE: RIGHT TEMPLE
LOCATION SIMPLE: RIGHT FOREHEAD

## 2023-11-15 ASSESSMENT — LOCATION ZONE DERM
LOCATION ZONE: EAR
LOCATION ZONE: FACE

## 2023-11-15 NOTE — PROCEDURE: LIQUID NITROGEN
Number Of Freeze-Thaw Cycles: 1 freeze-thaw cycle
Render Post-Care Instructions In Note?: no
Show Applicator Variable?: Yes
Duration Of Freeze Thaw-Cycle (Seconds): 10
Post-Care Instructions: I reviewed with the patient in detail post-care instructions. Patient is to wear sunprotection, and avoid picking at any of the treated lesions. Pt may apply Vaseline to crusted or scabbing areas.
Consent: The patient's consent was obtained including but not limited to risks of crusting, scabbing, blistering, scarring, darker or lighter pigmentary change, recurrence, incomplete removal and infection.
Detail Level: Detailed

## 2023-11-15 NOTE — PROCEDURE: TREATMENT REGIMEN
Initiate Treatment: Hydrocortisone 2.5% bid for 2 weeks alternating with 2 weeks off
Detail Level: Zone

## 2023-11-24 DIAGNOSIS — J45.20 MILD INTERMITTENT ASTHMA WITHOUT COMPLICATION: ICD-10-CM

## 2023-11-24 RX ORDER — ALBUTEROL SULFATE 90 UG/1
2 AEROSOL, METERED RESPIRATORY (INHALATION) EVERY 6 HOURS PRN
Qty: 8.5 G | Refills: 5 | Status: SHIPPED | OUTPATIENT
Start: 2023-11-24

## 2023-11-24 RX ORDER — FLUTICASONE PROPIONATE 110 UG/1
1 AEROSOL, METERED RESPIRATORY (INHALATION) 2 TIMES DAILY
Qty: 1 EACH | Refills: 6 | Status: SHIPPED | OUTPATIENT
Start: 2023-11-24

## 2024-01-09 ENCOUNTER — APPOINTMENT (RX ONLY)
Dept: URBAN - METROPOLITAN AREA CLINIC 35 | Facility: CLINIC | Age: 69
Setting detail: DERMATOLOGY
End: 2024-01-09

## 2024-01-09 DIAGNOSIS — Z41.9 ENCOUNTER FOR PROCEDURE FOR PURPOSES OTHER THAN REMEDYING HEALTH STATE, UNSPECIFIED: ICD-10-CM

## 2024-01-09 PROCEDURE — ? FRAXEL RESTORE DUAL

## 2024-01-09 ASSESSMENT — LOCATION ZONE DERM: LOCATION ZONE: FACE

## 2024-01-09 ASSESSMENT — LOCATION DETAILED DESCRIPTION DERM: LOCATION DETAILED: LEFT INFERIOR MEDIAL FOREHEAD

## 2024-01-09 ASSESSMENT — LOCATION SIMPLE DESCRIPTION DERM: LOCATION SIMPLE: LEFT FOREHEAD

## 2024-01-09 NOTE — PROCEDURE: FRAXEL RESTORE DUAL
Topical Anesthesia?: 23% lidocaine, 7% tetracaine
Pre-Procedure Text: 1.5 hours numbing
Length Topical Anesthesia Applied (Optional): 30 minutes
Post-Care Instructions: I reviewed with the patient in detail post-care instructions.
Passes (Optional): 8
Actual Kj Used (Optional): 3.19
Treatment Level (Optional): 9
Treatment Number (Optional): 23
Eye Shielding Text (Leave Blank If Unwanted- Will Be Inserted If Selecting Eye Shields): The intraocular eye shields were placed. 2 drops of intraocular tetracaine HCL ophthalmic 0.5% solution was administered. The eye shields were coated with ophthalmic bacitracin prior to insertion. After the shields were removed the eyes were flushed with normal saline.
Consent: Written consent obtained, risks reviewed including but not limited to crusting, scabbing, blistering, scarring, darker or lighter pigmentary change, and/or incomplete removal.
Detail Level: Zone
Energy In Mj (Optional): 20
Price (Use Numbers Only, No Special Characters Or $): 3813

## 2024-03-01 ENCOUNTER — APPOINTMENT (RX ONLY)
Dept: URBAN - METROPOLITAN AREA CLINIC 35 | Facility: CLINIC | Age: 69
Setting detail: DERMATOLOGY
End: 2024-03-01

## 2024-03-01 DIAGNOSIS — Z41.9 ENCOUNTER FOR PROCEDURE FOR PURPOSES OTHER THAN REMEDYING HEALTH STATE, UNSPECIFIED: ICD-10-CM

## 2024-03-01 PROCEDURE — ? FRAXEL RESTORE DUAL

## 2024-03-01 ASSESSMENT — LOCATION SIMPLE DESCRIPTION DERM: LOCATION SIMPLE: LEFT FOREHEAD

## 2024-03-01 ASSESSMENT — LOCATION DETAILED DESCRIPTION DERM: LOCATION DETAILED: LEFT INFERIOR MEDIAL FOREHEAD

## 2024-03-01 ASSESSMENT — LOCATION ZONE DERM: LOCATION ZONE: FACE

## 2024-03-01 NOTE — PROCEDURE: FRAXEL RESTORE DUAL
Topical Anesthesia?: 23% lidocaine, 7% tetracaine
Pre-Procedure Text: 1.5 hours numbing
Length Topical Anesthesia Applied (Optional): 50 minutes
Post-Care Instructions: I reviewed with the patient in detail post-care instructions.
Passes (Optional): 8
Actual Kj Used (Optional): 3.53
Treatment Level (Optional): 9
Treatment Number (Optional): 29
Eye Shielding Text (Leave Blank If Unwanted- Will Be Inserted If Selecting Eye Shields): The intraocular eye shields were placed. 2 drops of intraocular tetracaine HCL ophthalmic 0.5% solution was administered. The eye shields were coated with ophthalmic bacitracin prior to insertion. After the shields were removed the eyes were flushed with normal saline.
Consent: Written consent obtained, risks reviewed including but not limited to crusting, scabbing, blistering, scarring, darker or lighter pigmentary change, and/or incomplete removal.
Detail Level: Zone
Energy In Mj (Optional): 20
Price (Use Numbers Only, No Special Characters Or $): 4955

## 2024-03-01 NOTE — HPI: COSMETIC (LASER RESURFACING)
Have You Had Laser Resurfacing Before?: has had previous treatments
When Was Your Last Laser Resurfacing Treatment?: 01/09/2024

## 2024-03-18 DIAGNOSIS — L40.9 PSORIASIS: ICD-10-CM

## 2024-03-18 RX ORDER — CALCIPOTRIENE 50 UG/G
CREAM TOPICAL
Qty: 120 G | Refills: 6 | Status: SHIPPED | OUTPATIENT
Start: 2024-03-18

## 2024-04-12 ENCOUNTER — OFFICE VISIT (OUTPATIENT)
Dept: SLEEP MEDICINE | Facility: MEDICAL CENTER | Age: 69
End: 2024-04-12
Attending: NURSE PRACTITIONER
Payer: MEDICARE

## 2024-04-12 VITALS
HEIGHT: 68 IN | RESPIRATION RATE: 16 BRPM | OXYGEN SATURATION: 94 % | BODY MASS INDEX: 24.25 KG/M2 | DIASTOLIC BLOOD PRESSURE: 60 MMHG | HEART RATE: 50 BPM | WEIGHT: 160 LBS | SYSTOLIC BLOOD PRESSURE: 108 MMHG

## 2024-04-12 DIAGNOSIS — G47.33 OSA (OBSTRUCTIVE SLEEP APNEA): ICD-10-CM

## 2024-04-12 PROCEDURE — 99213 OFFICE O/P EST LOW 20 MIN: CPT | Performed by: NURSE PRACTITIONER

## 2024-04-12 PROCEDURE — 99214 OFFICE O/P EST MOD 30 MIN: CPT | Performed by: NURSE PRACTITIONER

## 2024-04-12 PROCEDURE — 3074F SYST BP LT 130 MM HG: CPT | Performed by: NURSE PRACTITIONER

## 2024-04-12 PROCEDURE — 3078F DIAST BP <80 MM HG: CPT | Performed by: NURSE PRACTITIONER

## 2024-04-12 ASSESSMENT — FIBROSIS 4 INDEX: FIB4 SCORE: 2.02

## 2024-04-12 ASSESSMENT — PATIENT HEALTH QUESTIONNAIRE - PHQ9: CLINICAL INTERPRETATION OF PHQ2 SCORE: 0

## 2024-04-12 NOTE — PROGRESS NOTES
Chief Complaint   Patient presents with    Follow-Up     Last seen 04/27/2023 Stevenson Chow  1 yr. F/v GONZALEZ       HPI:  Adal Dobbins is a 69 y.o. year old male here today for follow-up on GONZALEZ f/u. PMH includes asthma, psoriasis, history of stroke from vertebral artery dissection, mixed hyperlipidemia with apolipoprotein A3 variant, RBBB, Agaston CAC score >400, BPH, AAA, hypertension, arthrosclerosis of bilateral carotid arteries, hypothyroid, GONZALEZ, ED, light cigar smoker.     Patient was set up with a ResMed auto CPAP machine on 9/19/2022.  Using a ResMed AirFit N30 I.  Denies any difficulty with mask fit or pressures.  He does have occasional irritation from using the mask for prolonged period of time.  He does state that he does not clean it frequently.  Cleaning precautions and recommendations were discussed.  Averages 8 to 9 hours of sleep and denies any difficulty falling or staying asleep.  Overall notes improvement in sleep quality and denies any excessive daytime sleepiness, morning headaches, palpitations, concentration or memory problems.      30-day compliance shows 100% use with an average time of 8 hours and 28 minutes and a resultant 8 AHI of 1.5.    Sleep/Card Study History:   PSG study from 6/1/22 indicated Mild OAS with AHI of 9.2/hr and O2 yoel 82 %. AHI during rem was 22.5 and AHI while supine was 10.56. The patient spent 12.8 minutes of TST with SaO2 <88%.     NM Cardiac Stress test from 4/15/22 indicated No evidence of significant jeopardized viable myocardium or prior myocardial infarction. Normal left ventricular size, ejection fraction, and wall motion. LVEF 65%.  ST Depression during lexiscan administration which could be compatible with ischemia, but non-specific given RBBB. ECG INTERPRETATION abnormal ECG response to lexiscan.    ROS: As per HPI and otherwise negative if not stated.    Past Medical History:   Diagnosis Date    ASTHMA     mild intermittent    Chickenpox     Family  "history of hypertension     History of stroke     HTN (hypertension) 2011    Hyperlipidemia     Hypothyroid 07/01/2016    Obstructive sleep apnea 6/13/2022    Psoriasis     Stroke (HCC) 1997    Vertebral artery disection       Past Surgical History:   Procedure Laterality Date    OTHER  age 12     nasal polyps       Family History   Problem Relation Age of Onset    Hypertension Mother     Other Father 45        Viral encephalitis    Sleep Apnea Neg Hx        Allergies as of 04/12/2024    (No Known Allergies)        Vitals:  /60 (BP Location: Left arm, Patient Position: Sitting, BP Cuff Size: Adult)   Pulse (!) 50   Resp 16   Ht 1.727 m (5' 8\")   Wt 72.6 kg (160 lb)   SpO2 94%     Current medications as of today   Current Outpatient Medications   Medication Sig Dispense Refill    calcipotriene (DOVONEX) 0.005 % Cream APPLY 2 GRAMS EXTERNALLY TO THE AFFECTED AREA TWICE DAILY 120 g 6    albuterol 108 (90 Base) MCG/ACT Aero Soln inhalation aerosol Inhale 2 Puffs every 6 hours as needed for Shortness of Breath. 8.5 g 5    fluticasone (FLOVENT HFA) 110 MCG/ACT Aerosol Inhale 1 Puff 2 times a day. 1 Each 6    tadalafil (CIALIS) 20 MG tablet Take 1 Tablet by mouth 1 time a day as needed for Erectile Dysfunction. 20 Tablet 3    SYNTHROID 125 MCG Tab Take 1 Tablet by mouth every morning on an empty stomach. 90 Tablet 3    benazepril (LOTENSIN) 10 MG Tab Take 1 Tablet by mouth every day. 90 Tablet 3    rosuvastatin (CRESTOR) 20 MG Tab Take 1 Tablet by mouth every evening. 90 Tablet 3    ezetimibe (ZETIA) 10 MG Tab Take 1 Tablet by mouth every day. 30 Tablet 11    B Complex Cap Take 1 Cap by mouth every day. 100 Cap 3    Cholecalciferol (VITAMIN D) 2000 UNITS Cap Take 1 Each by mouth every day.      aspirin EC (ECOTRIN) 81 MG Tablet Delayed Response Take 81 mg by mouth every day.       No current facility-administered medications for this visit.         Physical Exam:   Gen:           Alert and oriented, No apparent " distress. Mood and affect appropriate, normal interaction with examiner.  Eyes:          PERRL, EOM intact, sclere white, conjunctive moist.  Ears:          Not examined.   Hearing:     Grossly intact.  Nose:          Normal, no lesions or deformities.  Dentition:    Good dentition.  Oropharynx:   Tongue normal, posterior pharynx without erythema or exudate.  Neck:        Supple, trachea midline, no masses.  Respiratory Effort: No intercostal retractions or use of accessory muscles.   Lung Auscultation:      Clear to auscultation bilaterally; no rales, rhonchi or wheezing.  CV:            Regular rate and rhythm. No murmurs, rubs or gallops.  Abd:           Not examined.   Lymphadenopathy: Not examined.  Gait and Station: Normal.  Digits and Nails: No clubbing, cyanosis, petechiae, or nodes.   Cranial Nerves: II-XII grossly intact.  Skin:        No rashes, lesions or ulcers noted.               Ext:           No cyanosis or edema.      Assessment:  1. GONZALEZ (obstructive sleep apnea)  DME Mask and Supplies            Plan:  Benefiting from CPAP.  Compliance shows adequate use and control of GONZALEZ.  Order placed for mask and supplies which will be good for 1 year.  Advise annual follow-up, but can be seen sooner if needed.    Please note that this dictation was created using voice recognition software. I have made every reasonable attempt to correct obvious errors, but it is possible there are errors of grammar and possibly content that I did not discover before finalizing the note.

## 2024-04-15 ENCOUNTER — HOSPITAL ENCOUNTER (OUTPATIENT)
Dept: RADIOLOGY | Facility: MEDICAL CENTER | Age: 69
End: 2024-04-15
Attending: INTERNAL MEDICINE
Payer: COMMERCIAL

## 2024-04-15 DIAGNOSIS — E78.2 MIXED HYPERLIPIDEMIA WITH APOLIPOPROTEIN E3 VARIANT: ICD-10-CM

## 2024-04-15 PROCEDURE — 93998 UNLISTD NONINVAS VASC DX STD: CPT

## 2024-04-15 PROCEDURE — 306723 US-TOTAL VASCULAR SCREENING (S/P)

## 2024-04-22 DIAGNOSIS — R35.1 BENIGN PROSTATIC HYPERPLASIA WITH NOCTURIA: ICD-10-CM

## 2024-04-22 DIAGNOSIS — N40.1 BENIGN PROSTATIC HYPERPLASIA WITH NOCTURIA: ICD-10-CM

## 2024-04-22 DIAGNOSIS — E03.9 HYPOTHYROIDISM (ACQUIRED): ICD-10-CM

## 2024-04-24 DIAGNOSIS — I10 ESSENTIAL HYPERTENSION: ICD-10-CM

## 2024-04-24 DIAGNOSIS — E78.00 HYPERCHOLESTEROLEMIA: ICD-10-CM

## 2024-04-24 DIAGNOSIS — E03.9 HYPOTHYROIDISM (ACQUIRED): ICD-10-CM

## 2024-04-25 ENCOUNTER — HOSPITAL ENCOUNTER (OUTPATIENT)
Facility: MEDICAL CENTER | Age: 69
End: 2024-04-25
Attending: INTERNAL MEDICINE
Payer: MEDICARE

## 2024-04-25 ENCOUNTER — NON-PROVIDER VISIT (OUTPATIENT)
Dept: INTERNAL MEDICINE | Facility: IMAGING CENTER | Age: 69
End: 2024-04-25
Payer: MEDICARE

## 2024-04-25 DIAGNOSIS — I10 ESSENTIAL HYPERTENSION: ICD-10-CM

## 2024-04-25 DIAGNOSIS — E03.9 HYPOTHYROIDISM, UNSPECIFIED TYPE: ICD-10-CM

## 2024-04-25 DIAGNOSIS — E03.9 HYPOTHYROIDISM (ACQUIRED): ICD-10-CM

## 2024-04-25 DIAGNOSIS — R35.1 BENIGN PROSTATIC HYPERPLASIA WITH NOCTURIA: ICD-10-CM

## 2024-04-25 DIAGNOSIS — E78.00 HYPERCHOLESTEROLEMIA: ICD-10-CM

## 2024-04-25 DIAGNOSIS — N40.1 BENIGN PROSTATIC HYPERPLASIA WITH NOCTURIA: ICD-10-CM

## 2024-04-25 DIAGNOSIS — E78.2 MIXED HYPERLIPIDEMIA WITH APOLIPOPROTEIN E3 VARIANT: ICD-10-CM

## 2024-04-25 LAB
ALBUMIN SERPL BCP-MCNC: 4.4 G/DL (ref 3.2–4.9)
ALBUMIN/GLOB SERPL: 2.1 G/DL
ALP SERPL-CCNC: 45 U/L (ref 30–99)
ALT SERPL-CCNC: 37 U/L (ref 2–50)
ANION GAP SERPL CALC-SCNC: 10 MMOL/L (ref 7–16)
AST SERPL-CCNC: 47 U/L (ref 12–45)
BILIRUB SERPL-MCNC: 0.8 MG/DL (ref 0.1–1.5)
BUN SERPL-MCNC: 18 MG/DL (ref 8–22)
CALCIUM ALBUM COR SERPL-MCNC: 8.8 MG/DL (ref 8.5–10.5)
CALCIUM SERPL-MCNC: 9.1 MG/DL (ref 8.5–10.5)
CHLORIDE SERPL-SCNC: 106 MMOL/L (ref 96–112)
CO2 SERPL-SCNC: 24 MMOL/L (ref 20–33)
CREAT SERPL-MCNC: 0.74 MG/DL (ref 0.5–1.4)
CRP SERPL HS-MCNC: 0.2 MG/L (ref 0–3)
GFR SERPLBLD CREATININE-BSD FMLA CKD-EPI: 99 ML/MIN/1.73 M 2
GLOBULIN SER CALC-MCNC: 2.1 G/DL (ref 1.9–3.5)
GLUCOSE SERPL-MCNC: 80 MG/DL (ref 65–99)
POTASSIUM SERPL-SCNC: 4.4 MMOL/L (ref 3.6–5.5)
PROT SERPL-MCNC: 6.5 G/DL (ref 6–8.2)
PSA SERPL-MCNC: 5.03 NG/ML (ref 0–4)
SODIUM SERPL-SCNC: 140 MMOL/L (ref 135–145)
T3FREE SERPL-MCNC: 2.89 PG/ML (ref 2–4.4)
T4 FREE SERPL-MCNC: 1.5 NG/DL (ref 0.93–1.7)
T4 SERPL-MCNC: 8.1 UG/DL (ref 4–12)
TSH SERPL DL<=0.005 MIU/L-ACNC: 0.82 UIU/ML (ref 0.38–5.33)
TSH SERPL DL<=0.005 MIU/L-ACNC: 0.84 UIU/ML (ref 0.38–5.33)
URATE SERPL-MCNC: 5.8 MG/DL (ref 2.5–8.3)
VIT B12 SERPL-MCNC: 672 PG/ML (ref 211–911)

## 2024-04-25 PROCEDURE — 84153 ASSAY OF PSA TOTAL: CPT

## 2024-04-25 PROCEDURE — 83550 IRON BINDING TEST: CPT

## 2024-04-25 PROCEDURE — 83525 ASSAY OF INSULIN: CPT

## 2024-04-25 PROCEDURE — 84443 ASSAY THYROID STIM HORMONE: CPT

## 2024-04-25 PROCEDURE — 84482 T3 REVERSE: CPT

## 2024-04-25 PROCEDURE — 84481 FREE ASSAY (FT-3): CPT

## 2024-04-25 PROCEDURE — 36415 COLL VENOUS BLD VENIPUNCTURE: CPT

## 2024-04-25 PROCEDURE — 99999 PR NO CHARGE: CPT

## 2024-04-25 PROCEDURE — 80053 COMPREHEN METABOLIC PANEL: CPT

## 2024-04-25 PROCEDURE — 82728 ASSAY OF FERRITIN: CPT

## 2024-04-25 PROCEDURE — 80061 LIPID PANEL: CPT | Mod: XU

## 2024-04-25 PROCEDURE — 84403 ASSAY OF TOTAL TESTOSTERONE: CPT

## 2024-04-25 PROCEDURE — 84402 ASSAY OF FREE TESTOSTERONE: CPT

## 2024-04-25 PROCEDURE — 82306 VITAMIN D 25 HYDROXY: CPT

## 2024-04-25 PROCEDURE — 83704 LIPOPROTEIN BLD QUAN PART: CPT

## 2024-04-25 PROCEDURE — 84436 ASSAY OF TOTAL THYROXINE: CPT

## 2024-04-25 PROCEDURE — 82172 ASSAY OF APOLIPOPROTEIN: CPT

## 2024-04-25 PROCEDURE — 84443 ASSAY THYROID STIM HORMONE: CPT | Mod: 91

## 2024-04-25 PROCEDURE — 82607 VITAMIN B-12: CPT

## 2024-04-25 PROCEDURE — 84270 ASSAY OF SEX HORMONE GLOBUL: CPT

## 2024-04-25 PROCEDURE — 84550 ASSAY OF BLOOD/URIC ACID: CPT

## 2024-04-25 PROCEDURE — 86141 C-REACTIVE PROTEIN HS: CPT

## 2024-04-25 PROCEDURE — 84439 ASSAY OF FREE THYROXINE: CPT

## 2024-04-25 PROCEDURE — 83540 ASSAY OF IRON: CPT

## 2024-04-25 NOTE — PROGRESS NOTES
Adal Dobbins is a 69 y.o. male here for a non-provider visit for a lab draw on 4/25/2024 at 8:13 AM.    Procedure performed:  Venipuncture     Anatomical site:  Left Antecubital Area    Equipment used:  21 g vacutainer     Labs drawn:      Ordering provider:  Bloch MD, DARIA Steen MD    Lab draw completed by:  Andreina Le R.N.

## 2024-04-26 ENCOUNTER — TELEPHONE (OUTPATIENT)
Dept: VASCULAR LAB | Facility: MEDICAL CENTER | Age: 69
End: 2024-04-26
Payer: MEDICARE

## 2024-04-26 DIAGNOSIS — E78.2 MIXED HYPERLIPIDEMIA WITH APOLIPOPROTEIN E3 VARIANT: ICD-10-CM

## 2024-04-26 DIAGNOSIS — I10 HYPERTENSION, UNSPECIFIED TYPE: ICD-10-CM

## 2024-04-26 DIAGNOSIS — E78.41 ELEVATED LP(A): ICD-10-CM

## 2024-04-26 DIAGNOSIS — R93.1 AGATSTON CAC SCORE, >400: ICD-10-CM

## 2024-04-26 LAB
25(OH)D3 SERPL-MCNC: 33 NG/ML (ref 30–100)
FERRITIN SERPL-MCNC: 87.7 NG/ML (ref 22–322)
IRON SATN MFR SERPL: 35 % (ref 15–55)
IRON SERPL-MCNC: 103 UG/DL (ref 50–180)
TIBC SERPL-MCNC: 292 UG/DL (ref 250–450)
UIBC SERPL-MCNC: 189 UG/DL (ref 110–370)

## 2024-04-26 NOTE — TELEPHONE ENCOUNTER
Established patient  Chart prep for upcoming appointment.    Any pending/incomplete orders from last visit? No, all orders completed.  Was patient called and reminded to complete pending orders? N/A orders complete  Were any records requested?  No    Referral up to date? Yes renewal ordered, sent to provider to co-sign, AND new referral attached to upcoming appointment.    Referral attached to appointment (renewals and New patients only)? Yes renewal ordered and sent to provider to co-sign   Virtual appointment? No    Murtaza Morales Ass't  [facility]  Ph. [phone]  Fx. [phone]

## 2024-04-26 NOTE — TELEPHONE ENCOUNTER
Need ICD 10 code for Vitamin D Lab.  The lab would not accept diagnosis code.       Please assist.    Meme Farfan, Med Ass't  [facility]  Ph. [phone]  Fx. [phone]

## 2024-04-26 NOTE — TELEPHONE ENCOUNTER
Called [facility] and provided diagnosis code: E55.9   Code accepted.    Meme Farfan, Med Ass't  [facility]  Ph. [phone]  Fx. [phone]

## 2024-04-27 LAB
APO B100 SERPL-MCNC: 47 MG/DL (ref 66–133)
SHBG SERPL-SCNC: 69 NMOL/L (ref 19–76)
TESTOST FREE MFR SERPL: 1.2 % (ref 1.6–2.9)
TESTOST FREE SERPL-MCNC: 76 PG/ML (ref 47–244)
TESTOST SERPL-MCNC: 629 NG/DL (ref 300–720)

## 2024-04-28 LAB — INSULIN P FAST SERPL-ACNC: 5 UIU/ML (ref 3–25)

## 2024-04-29 ENCOUNTER — OFFICE VISIT (OUTPATIENT)
Dept: VASCULAR LAB | Facility: MEDICAL CENTER | Age: 69
End: 2024-04-29
Attending: INTERNAL MEDICINE
Payer: MEDICARE

## 2024-04-29 ENCOUNTER — OFFICE VISIT (OUTPATIENT)
Dept: INTERNAL MEDICINE | Facility: IMAGING CENTER | Age: 69
End: 2024-04-29
Payer: MEDICARE

## 2024-04-29 VITALS
BODY MASS INDEX: 25.46 KG/M2 | HEART RATE: 51 BPM | DIASTOLIC BLOOD PRESSURE: 66 MMHG | HEIGHT: 68 IN | WEIGHT: 168 LBS | SYSTOLIC BLOOD PRESSURE: 109 MMHG

## 2024-04-29 VITALS
HEIGHT: 68 IN | DIASTOLIC BLOOD PRESSURE: 68 MMHG | WEIGHT: 167 LBS | RESPIRATION RATE: 12 BRPM | HEART RATE: 49 BPM | BODY MASS INDEX: 25.31 KG/M2 | TEMPERATURE: 98.3 F | OXYGEN SATURATION: 97 % | SYSTOLIC BLOOD PRESSURE: 104 MMHG

## 2024-04-29 DIAGNOSIS — R93.1 AGATSTON CAC SCORE 100-199: ICD-10-CM

## 2024-04-29 DIAGNOSIS — I70.0 ABDOMINAL AORTIC ATHEROSCLEROSIS (HCC): ICD-10-CM

## 2024-04-29 DIAGNOSIS — N40.1 BENIGN PROSTATIC HYPERPLASIA WITH NOCTURIA: ICD-10-CM

## 2024-04-29 DIAGNOSIS — G47.33 OBSTRUCTIVE SLEEP APNEA: ICD-10-CM

## 2024-04-29 DIAGNOSIS — R35.1 BENIGN PROSTATIC HYPERPLASIA WITH NOCTURIA: ICD-10-CM

## 2024-04-29 DIAGNOSIS — E78.2 MIXED HYPERLIPIDEMIA WITH APOLIPOPROTEIN E3 VARIANT: ICD-10-CM

## 2024-04-29 DIAGNOSIS — E78.41 ELEVATED LP(A): ICD-10-CM

## 2024-04-29 DIAGNOSIS — E03.9 HYPOTHYROIDISM, UNSPECIFIED TYPE: ICD-10-CM

## 2024-04-29 DIAGNOSIS — I10 HYPERTENSION, UNSPECIFIED TYPE: ICD-10-CM

## 2024-04-29 DIAGNOSIS — I10 ESSENTIAL HYPERTENSION: ICD-10-CM

## 2024-04-29 DIAGNOSIS — E03.9 HYPOTHYROIDISM (ACQUIRED): ICD-10-CM

## 2024-04-29 DIAGNOSIS — E78.00 HYPERCHOLESTEROLEMIA: ICD-10-CM

## 2024-04-29 DIAGNOSIS — L40.9 PSORIASIS: ICD-10-CM

## 2024-04-29 DIAGNOSIS — Z00.00 MEDICARE ANNUAL WELLNESS VISIT, SUBSEQUENT: ICD-10-CM

## 2024-04-29 DIAGNOSIS — R01.1 HEART MURMUR: ICD-10-CM

## 2024-04-29 PROCEDURE — 99212 OFFICE O/P EST SF 10 MIN: CPT

## 2024-04-29 RX ORDER — EZETIMIBE 10 MG/1
10 TABLET ORAL DAILY
Qty: 100 TABLET | Refills: 3 | Status: SHIPPED | OUTPATIENT
Start: 2024-04-29

## 2024-04-29 ASSESSMENT — FIBROSIS 4 INDEX
FIB4 SCORE: 2.27
FIB4 SCORE: 2.27

## 2024-04-29 ASSESSMENT — ENCOUNTER SYMPTOMS: GENERAL WELL-BEING: GOOD

## 2024-04-29 ASSESSMENT — ACTIVITIES OF DAILY LIVING (ADL): BATHING_REQUIRES_ASSISTANCE: 0

## 2024-04-29 ASSESSMENT — PATIENT HEALTH QUESTIONNAIRE - PHQ9: CLINICAL INTERPRETATION OF PHQ2 SCORE: 0

## 2024-04-29 NOTE — PROGRESS NOTES
[facility] - follow up  Visit  Date of Service: 11/06/23    Patient here for f/u of dyslipidemia    Subjective    HPI  History of ASCVD: No  Other Established (non-atherosclerotic) Vascular Disease, if Present: None  Age at Initial Diagnosis of Dyslipidemia: 50s    Current Prescription Lipid Lowering Medications - including dose:   Statin: Rosuvastatin 20 mg  Non-Statin: Ezetimibe 10 mg a day  Current Lipid Lowering and Related Supplements:   Aspirin 81 mg a day  Vitamin D  Coq.10  Any Current Side Effects Potentially Related to Lipid Lowering therapy?   No  Current Adherence to Lipid Lowering Therapies   Complete  Previously Attempted Interventions for Lipids - including outcome  Statin: None    Outcome: N/A  Non-Statin: None   Outcome: N/A  Any Previous History of Statin Intolerance?   No  Baseline Lipids Prior to Treatment:   Unknown or unavailable - only on treatment blood work available  Other Pertinent History:   Excellent exercise tolerance  No angina  Remains on same dose levothyroxine -good energy level  History of other CV risk factors:   He is down to 10 mg of benazepril a day  Home readings in the 110s to 120s  History of cigarette and cigar smoking - none recently  No fam history of premature ASCVD    FAMILY HISTORY: High cholesterol and high bp but no ascvd in first degree relatives on mothers side. Does not know fathers histoyr    SOCIAL HISTORY   Social History     Tobacco Use   Smoking Status Light Smoker    Types: Cigars   Smokeless Tobacco Never   Tobacco Comments    1 cigar per week     Change in weight: Has maintained his weight loss  Exercise habits: strenuous regular exercise program  Diet: Mostly plant based with limited processed foods.  Has added some relatively low saturated fat reprotein        Objective      Vitals:    04/29/24 1520   BP: 109/66   BP Location: Left arm   Patient Position: Sitting   BP Cuff Size: Adult   Pulse: (!) 51   Weight: 76.2 kg (168 lb)   Height: 1.727 m  "(5' 8\")      Physical Exam  Vitals reviewed.   Constitutional:       General: He is not in acute distress.     Appearance: He is well-developed. He is not toxic-appearing or diaphoretic.   HENT:      Head: Normocephalic and atraumatic.   Eyes:      General: No scleral icterus.     Extraocular Movements: Extraocular movements intact.      Conjunctiva/sclera: Conjunctivae normal.   Neck:      Thyroid: No thyromegaly.      Vascular: No carotid bruit or JVD.   Cardiovascular:      Rate and Rhythm: Normal rate and regular rhythm.      Heart sounds: Murmur heard.      No friction rub. No gallop.   Pulmonary:      Effort: Pulmonary effort is normal. No respiratory distress.      Breath sounds: Normal breath sounds. No wheezing or rales.   Musculoskeletal:      Right lower leg: No edema.      Left lower leg: No edema.   Skin:     Coloration: Skin is not pale.   Neurological:      General: No focal deficit present.      Mental Status: He is alert and oriented to person, place, and time.      Cranial Nerves: No cranial nerve deficit.      Coordination: Coordination normal.      Gait: Gait normal.   Psychiatric:         Mood and Affect: Mood normal.         Behavior: Behavior normal.         DATA REVIEW:  Most Recent Lipid Panel:   Lab Results   Component Value Date    CHOLSTRLTOT 124 11/01/2023    CHOLSTRLTOT 152 04/27/2023    TRIGLYCERIDE 62 11/01/2023    TRIGLYCERIDE 63 04/27/2023    HDL 63 (H) 11/01/2023    HDL 54 04/27/2023    LDL 85 04/27/2023   LDL C -pending  LDLP -pending  apoB 47  CRP 0.2  Lp(a) 33 milligrams per deciliter (old)    Other Pertinent Blood Work:   Lab Results   Component Value Date    SODIUM 140 04/25/2024    POTASSIUM 4.4 04/25/2024    CHLORIDE 106 04/25/2024    CO2 24 04/25/2024    ANION 10.0 04/25/2024    GLUCOSE 80 04/25/2024    BUN 18 04/25/2024    CREATININE 0.74 04/25/2024    CALCIUM 9.1 04/25/2024    ASTSGOT 47 (H) 04/25/2024    ALTSGPT 37 04/25/2024    ALKPHOSPHAT 45 04/25/2024    TBILIRUBIN " 0.8 04/25/2024    ALBUMIN 4.4 04/25/2024    AGRATIO 2.1 04/25/2024    CRPHIGHSEN 0.2 04/25/2024    TSHULTRASEN 0.820 04/25/2024    TSHULTRASEN 0.840 04/25/2024    CPKTOTAL 223 (H) 03/29/2023       CAC 2014  LMA - 0.0   LCX - 0.0   LAD - 102.8   RCA - 0.0   PDA - 30.5  Calcium Score:  133.3     vasc screen and cimt 2017  Mild carotid plaque  No aaa  Normal bee  Mean cimt 0.719  [facility] age 62    Stress Echo 2018  Negative stress echocardiogram for ischemia with adequate stress achieved by   heart rate criteria.     vasc screen and cimt 2018  Mild carotid plaque  No aaa  Normal bee  Mean cimt 0.719  [facility] age 63    CAC score 2022  LMA - 0.0  LCX - 0.0  LAD - 469.2  RCA - 117.9  PDA - 587.1  Total Calcium Score: 587.1    vasc screen and cimt April 2022  Mild carotid plaque  No aaa  Normal bee  Mean cimt 0.758  [facility] age 67    MPI april 2022   No evidence of significant jeopardized viable myocardium or prior myocardial    infarction.   Normal left ventricular size, ejection fraction, and wall motion.    CIMT and vasc screen april 2023   Moderate plaque of the carotid bifurcation with velocities consistent with less than 50% stenosis of the internal carotid.    No aneurysm of the abdominal aorta.    ABIs are increased bilaterally consistent with vessel hardening, limiting evaluation.  Composite mean C IMT 0.736  Vascular age 64    CMT and vascular screen April 2024  Mild carotid plaque bilaterally without significant stenosis  Normal BEE  No AAA  Mean CIMT 0.832  Vascular age 79        ASSESSMENT AND PLAN  Patient Type, check all that apply:   Primary Prevention  Established Atherosclerotic Cardiovascular Disease (ASCVD)  Subclinical ASCVD as evidenced by his elevated coronary calcium score, elevated C IMT, and mild carotid atheroma seen on vascular screen.  As we previously discussed at length, coronary calcium scores are not meant to be followed serially and his worsening in coronary calcium score between 2014 and 2022 is  as likely to be the result of calcification and stabilization of pre-existing plaque rather than progression of atherosclerosis.  CMT and vascular screen are a much better way to follow progression   Overall, I think picture is most consistent with the development of early atherosclerosis in his 40s and 50s with stabilization of existing plaque with better lifestyle and lipid-lowering therapy over the last 5 to 10 years  There is no evidence of ischemia on MPI and by symptoms so I do not think there is any reason to proceed with coronary CTA unless he develops symptoms.  This is an indication for aggressive medical management and we can follow his progression with C IMT and vascular screen serially.  I would not recommend he gets another coronary artery calcium score  His CIMT appears worse than previous -unclear if this is artifact  Other Established (non-atherosclerotic) Vascular Disease, if Present:  None  Evidence of Heterozygous Familial Hypercholesterolemia (FH):   No   ACC/AHA Indication for Statin Therapy, jose all that apply:  Baseline Calculated ASCVD risk >7.5%: Indication for Moderate intensity statin  Calculated Risk for ASCVD, if applicable    9.7 % - likely an underestimation given subclinical ascvd  Other Significant Risk Markers, if any, ojse all that apply   Subclinical atherosclerosis on CAC scoring  Subclinical atherosclerosis on vascular screen and C IMT -has been stable but worse on most recent imaging  Mildly elevated lipoprotein a  Favorable triglycerides, HDL, and LDL particle size  Favorable CRP   Treatment goals based on shared decision making  Using shared decision making we decided to treat to aggressively and target an LDL C less than 70, APO B less than 60, and LDL particle number at least less than 900 -  Previously much improved control with addition of ezetimibe  Currently apolipoprotein B shows great control  LDL-C and LDL P are pending  Lifestyle Recommendations From Today’s  Visit:    Continue regular exercise  Continue maintain weight  Continue very heart healthy diet low in saturated fat  Statin Therapy Recommendations from Today’s Visit:   Continue rosuvastatin to 20 mg daily  Non-Statin Medications Recommendations from Today’s Visit:   Continue ezetimibe 10 mg daily  Would consider addition of bempedoic acid or even perhaps some nicotinic acid if his CIMT continues to progress  He could even be a candidate for antiinflammatory agent   Indication for PCSK9 Inhibitor, if applicable:  Not currently indicated -but could consider if his atherosclerosis worsens   Supplements Recommended at this visit:   Continue vitamin D and coq.10 to help with statin tolerability  Recommendations for Other Cardiovascular Risk Factors, jose all that apply:   -Hypertension -under excellent control both in the office and at home despite decreasing doses of benazepril.  Continue benazepril 10 mg daily.  Continue home blood pressure monitoring  -History of smoking -recommend continued avoidance of all tobacco products  -Antiplatelet therapy -reasonable to continue low-dose aspirin given his subclinical CAD and low bleeding risk  Other Issues:  - hypothyroidism -asymptomatic.  Seems under reasonable control per symptoms and TSH.  Continue current dose levothyroxine. Recheck TSH T4 with next blood work.  Otherwise defer further management to PCP    Future Studies: Repeat cimt and vascular screen april 2025  Blood Work Ordered At Today’s visit: NMR, apoB, crp, cmp, Tsh/T4,  Follow-Up: 6 months    Time: 31 min - chart review/prep, review of other providers' records, imaging/lab review, face-to-face time for history/examination, ordering, prescribing,  review of results/meds/ treatment plan with patient/family/caregiver, documentation in EMR, care coordination (as needed)    Michael J Bloch, M.D.    CC:  DARIA Steen M.D.

## 2024-04-30 ENCOUNTER — TELEPHONE (OUTPATIENT)
Dept: VASCULAR LAB | Facility: MEDICAL CENTER | Age: 69
End: 2024-04-30
Payer: MEDICARE

## 2024-04-30 LAB — T3REVERSE SERPL-MCNC: 14.9 NG/DL (ref 9–27)

## 2024-04-30 NOTE — TELEPHONE ENCOUNTER
Called pt and left a detailed message if he is interested in filling through [facility] pharmacy for mailing services.     ABEL Villatoro, PhT  Vascular Pharmacy Liaison (Rx Coordinator)  P: [phone]  4/30/2024 12:29 PM

## 2024-04-30 NOTE — TELEPHONE ENCOUNTER
Received Refill PA request via [facility]  for EZETIMIBE 10MG TABLETS. (Quantity:90, Day Supply:90)     Insurance: [employer]  Member ID:  [id number]  BIN: [id number]  PCN: [id number]  Group: [id number]     Ran Test claim via [place] [facility] medication Pays for a $19.50/90DS ; $6.85/30DS copay. Will outreach to patient to offer specialty pharmacy services and or release to preferred pharmacy    ABEL Villatoro, PhT  Vascular Pharmacy Liaison (Rx Coordinator)  P: [phone]  4/30/2024 12:27 PM

## 2024-04-30 NOTE — PROGRESS NOTES
69 y.o. male presents for the following:    Patient comes in for annual health risk assessment, physical review laboratory. He considers himself in good health. He exercises radially. Diet is excellent.. MinimalAlcohol. No tobacco Products. He is up-to-date on colonoscopy.No depression. No balance issues. No cognitive issues.    Hypertension-stable on benazepril. No sign of an organ disease.    Hyperlipidemia-lipid panel is pending but cholesterol has been stable on combination ezetimibe and receive a statin. Apolipoprotein B is at goal, <50. He is followed by Dr. Bloch    Atherosclerosis-documented atherosclerosis in the coronary vessels as well as the abdominal aorta. Blood pressure is well-controlled. Lipids are at goal. He takes an aspirin daily.     BPH-PSA is stable. He is followed closely by urology.    Hypothyroid-clinically euthyroid. Remains on levothyroxin 125 µg.    Elevated liver function test-AST is mildly elevated. His AST and ALT have been trending higher, possibly related to starting ezetimibe    Psoriasis-stable. Followed by dermatology.    Sleep apnea-stable on CPAP. Followed closely by pulmonary medicine.    Annual Wellness Visit/Health Risk Assessment:    Past medical:  Past Medical History:   Diagnosis Date    ASTHMA     mild intermittent    Chickenpox     Family history of hypertension     History of stroke     HTN (hypertension) 2011    Hyperlipidemia     Hypothyroid 07/01/2016    Obstructive sleep apnea 6/13/2022    Psoriasis     Stroke (HCC) 1997    Vertebral artery disection       Past surgical:  Past Surgical History:   Procedure Laterality Date    OTHER  age 12     nasal polyps       Family history: relating to possible risk factors for your patient  Family History   Problem Relation Age of Onset    Hypertension Mother     Other Father 45        Viral encephalitis    Sleep Apnea Neg Hx        Current Providers (including home care/DME’s):   Hereditary Cancer, Medication and Heart Test:  "No Mutations ID'd (2018)  [id number]  \"Diaz\"    Patient Care Team:  Morteza Steen M.D. as PCP - General (Internal Medicine)  [facility] (DME Supplier)  AMILCAR Arias R.N.      Medications:   Current Outpatient Medications Ordered in Epic   Medication Sig Dispense Refill    ezetimibe (ZETIA) 10 MG Tab Take 1 Tablet by mouth every day. 100 Tablet 3    calcipotriene (DOVONEX) 0.005 % Cream APPLY 2 GRAMS EXTERNALLY TO THE AFFECTED AREA TWICE DAILY 120 g 6    albuterol 108 (90 Base) MCG/ACT Aero Soln inhalation aerosol Inhale 2 Puffs every 6 hours as needed for Shortness of Breath. 8.5 g 5    fluticasone (FLOVENT HFA) 110 MCG/ACT Aerosol Inhale 1 Puff 2 times a day. 1 Each 6    tadalafil (CIALIS) 20 MG tablet Take 1 Tablet by mouth 1 time a day as needed for Erectile Dysfunction. 20 Tablet 3    SYNTHROID 125 MCG Tab Take 1 Tablet by mouth every morning on an empty stomach. 90 Tablet 3    benazepril (LOTENSIN) 10 MG Tab Take 1 Tablet by mouth every day. 90 Tablet 3    rosuvastatin (CRESTOR) 20 MG Tab Take 1 Tablet by mouth every evening. 90 Tablet 3    B Complex Cap Take 1 Cap by mouth every day. 100 Cap 3    Cholecalciferol (VITAMIN D) 2000 UNITS Cap Take 1 Each by mouth every day.      aspirin EC (ECOTRIN) 81 MG Tablet Delayed Response Take 81 mg by mouth every day.       No current [facility]-ordered facility-administered medications on file.       Supplements (calcium/vitamins): if not lisited in medications    Chief Complaint   Patient presents with    Medicare Annual Wellness         HPI:  Adal Sanon Mu is a 69 y.o. here for Medicare Annual Wellness Visit     Patient Active Problem List    Diagnosis Date Noted    Obstructive sleep apnea, mild (2022 polysomnography) 06/13/2022    Agatston CAC score, >400 (April 2022 total 587.1) 04/05/2022    BPH with obstruction/lower urinary tract symptoms, nocturia 02/05/2021    Elevated Lp(a) 03/04/2019    RBBB 11/16/2017    Right Liver hemangiomas per " Ultrasound 11/16/2017    Mild Chronic inactive gastritis without bleeding (per 2017 EGD) 10/09/2017    Colon polyp (per 2017 Colonoscopy, negative for Adenomatous change or malignancy) 10/09/2017    Abdominal aortic atherosclerosis (HCC) 04/19/2017    Atherosclerosis of both carotid arteries, mild (US done April 2022) 04/19/2017    Agatston CAC score 100-199 02/27/2017    Hypertension 02/27/2017    Vitamin D deficiency 02/27/2017    Hypothyroid 07/01/2016    Erectile dysfunction 09/18/2014    Elevated CPK 09/18/2014    Mixed hyperlipidemia with apolipoprotein E3 variant 10/09/2013    Asthma     Psoriasis     Family history of hypertension     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 100 Tablet 3    calcipotriene (DOVONEX) 0.005 % Cream APPLY 2 GRAMS EXTERNALLY TO THE AFFECTED AREA TWICE DAILY 120 g 6    albuterol 108 (90 Base) MCG/ACT Aero Soln inhalation aerosol Inhale 2 Puffs every 6 hours as needed for Shortness of Breath. 8.5 g 5    fluticasone (FLOVENT HFA) 110 MCG/ACT Aerosol Inhale 1 Puff 2 times a day. 1 Each 6    tadalafil (CIALIS) 20 MG tablet Take 1 Tablet by mouth 1 time a day as needed for Erectile Dysfunction. 20 Tablet 3    SYNTHROID 125 MCG Tab Take 1 Tablet by mouth every morning on an empty stomach. 90 Tablet 3    benazepril (LOTENSIN) 10 MG Tab Take 1 Tablet by mouth every day. 90 Tablet 3    rosuvastatin (CRESTOR) 20 MG Tab Take 1 Tablet by mouth every evening. 90 Tablet 3    B Complex Cap Take 1 Cap by mouth every day. 100 Cap 3    Cholecalciferol (VITAMIN D) 2000 UNITS Cap Take 1 Each by mouth every day.      aspirin EC (ECOTRIN) 81 MG Tablet Delayed Response Take 81 mg by mouth every day.       No current facility-administered medications for this visit.            Current supplements as per medication list.       Allergies: Patient has no known  "allergies.    Current social contact/activities:  Social with friends and family.    He  reports that he has been smoking cigars. He has never used smokeless tobacco. He reports current alcohol use of about 8.4 oz of alcohol per week. He reports that he does not use drugs.  Ready to quit: Not Answered  Counseling given: Not Answered  Tobacco comments: 1 cigar per week        DPA/Advanced Directive:  Completed. Not available      ROS:    Gait: Uses : None  Ostomy: No  Other tubes: no   Amputations: no   Chronic oxygen use: no   Last eye exam: One month ago   : Denies any urinary leakage during the last 6 months incontinence.       Screening:  Hereditary Cancer, Medication and Heart Test: No Mutations ID'd (2018)  RBBB  \"Diaz\"    Depression Screening  Little interest or pleasure in doing things?  0 - not at all  Feeling down, depressed , or hopeless? 0 - not at all  Patient Health Questionnaire Score: 0     If depressive symptoms identified deferred to follow up visit unless specifically addressed in assessment and plan.    Interpretation of PHQ-9 Total Score   Score Severity   1-4 No Depression   5-9 Mild Depression   10-14 Moderate Depression   15-19 Moderately Severe Depression   20-27 Severe Depression    Screening for Cognitive Impairment  Do you or any of your friends or family members have any concern about your memory? No  Three Minute Recall (Leader, Season, Table) 3/3    Noam clock face with all 12 numbers and set the hands to show 10 minutes after 11.  Yes    Cognitive concerns identified deferred for follow up unless specifically addressed in assessment and plan.    Fall Risk Assessment  Has the patient had two or more falls in the last year or any fall with injury in the last year?  No    Safety Assessment  Do you always wear your seatbelt?  Yes  Any changes to home needed to function safely? No  Difficulty hearing.  No  Patient counseled about all safety risks that were identified.    Functional " Assessment ADLs  Are there any barriers preventing you from cooking for yourself or meeting nutritional needs?  No.    Are there any barriers preventing you from driving safely or obtaining transportation?  No.    Are there any barriers preventing you from using a telephone or calling for help?  No    Are there any barriers preventing you from shopping?  No.    Are there any barriers preventing you from taking care of your own finances?  No    Are there any barriers preventing you from managing your medications?  No    Are there any barriers preventing you from showering, bathing or dressing yourself? No    Are there any barriers preventing you from doing housework or laundry? No  Are there any barriers preventing you from using the toilet?No  Are you currently engaging in any exercise or physical activity?  Yes. Skiing, golf, gym (weights, HIT)    Self-Assessment of Health  What is your perception of your health? Good  Do you sleep more than six hours a night? Yes  In the past 7 days, how much did pain keep you from doing your normal work? None  Do you spend quality time with family or friends (virtually or in person)? Yes  Do you usually eat a heart healthy diet that constists of a variety of fruits, vegetables, whole grains and fiber? Yes  Do you eat foods high in fat and/or Fast Food more than three times per week? No    Advance Care Planning  Do you have an Advance Directive, Living Will, Durable Power of , or POLST?                   Health Maintenance Summary            Overdue - COVID-19 Vaccine (5 - 2023-24 season) Overdue since 9/1/2023 05/09/2022  Imm Admin: PFIZER PURPLE CAP SARS-COV-2 VACCINATION (12+)    05/09/2022  Imm Admin: PFIZER CAT CAP SARS-COV-2 VACCINATION (12+)    01/10/2021  Imm Admin: PFIZER PURPLE CAP SARS-COV-2 VACCINATION (12+)    12/20/2020  Imm Admin: PFIZER PURPLE CAP SARS-COV-2 VACCINATION (12+)              Annual Wellness Visit (Yearly) Next due on 4/29/2025       04/29/2024  Visit Dx: Medicare annual wellness visit, subsequent    04/12/2023  Done              IMM DTaP/Tdap/Td Vaccine (3 - Td or Tdap) Next due on 2/22/2026 02/22/2016  Imm Admin: Tdap Vaccine    05/01/2012  Imm Admin: Tdap Vaccine    08/05/2005  Imm Admin: TD Vaccine              Colorectal Cancer Screening (Colonoscopy - Every 10 Years) Tentatively due on 9/21/2027 09/21/2017  REFERRAL TO GI FOR COLONOSCOPY              Hepatitis C Screening  Tentatively Complete      12/06/2018  Hepatitis C Antibody component of HEP C VIRUS ANTIBODY              Zoster (Shingles) Vaccines (Series Information) Completed      08/11/2020  Imm Admin: Zoster Vaccine Recombinant (RZV) (SHINGRIX)    01/31/2020  Imm Admin: Zoster Vaccine Recombinant (RZV) (SHINGRIX)    09/13/2013  Imm Admin: Zoster Vaccine Live (ZVL) (Zostavax) - HISTORICAL DATA              Abdominal Aortic Aneurysm (AAA) Screening  Completed      04/05/2022  Done - [employer] TVS    10/24/2017  US-ABDOMEN COMPLETE SURVEY              Pneumococcal Vaccine: 65+ Years (Series Information) Completed      09/23/2022  Imm Admin: Pneumococcal Conjugate Vaccine (PCV20)              Influenza Vaccine (Series Information) Completed      10/30/2023  Imm Admin: Influenza Vaccine Adult HD    10/25/2022  Imm Admin: Influenza Vaccine Adult HD    09/29/2021  Imm Admin: Influenza Vaccine Quad Inj (Pf)    02/05/2021  Imm Admin: Influenza Vaccine Quad Inj (Pf)    10/21/2019  Imm Admin: Influenza Vaccine Quad Inj (Pf)    Only the first 5 history entries have been loaded, but more history exists.              HPV Vaccines (Series Information) Aged Out      No completion history exists for this topic.              Polio Vaccine (Inactivated Polio) (Series Information) Aged Out      No completion history exists for this topic.              Meningococcal Immunization (Series Information) Aged Out      No completion history exists for this topic.              Discontinued -  "Hepatitis A Vaccine (Hep A)  Discontinued      No completion history exists for this topic.              Discontinued - Hepatitis B Vaccine (Hep B)  Discontinued      No completion history exists for this topic.                    Patient Care Team:  Morteza Steen M.D. as PCP - General (Internal Medicine)  [facility] (DME Supplier)  AMILCAR Arias R.N.        Social History     Tobacco Use    Smoking status: Light Smoker     Types: Cigars    Smokeless tobacco: Never    Tobacco comments:     1 cigar per week   Vaping Use    Vaping Use: Never used   Substance Use Topics    Alcohol use: Yes     Alcohol/week: 8.4 oz     Types: 14 Standard drinks or equivalent per week     Comment: 2-3 drinks a week    Drug use: No     Family History   Problem Relation Age of Onset    Hypertension Mother     Other Father 45        Viral encephalitis    Sleep Apnea Neg Hx      He  has a past medical history of ASTHMA, Chickenpox, Family history of hypertension, History of stroke, HTN (hypertension) (2011), Hyperlipidemia, Hypothyroid (07/01/2016), Obstructive sleep apnea (6/13/2022), Psoriasis, and Stroke (HCC) (1997).   Past Surgical History:   Procedure Laterality Date    OTHER  age 12     nasal polyps       Exam:     /68 (BP Location: Left arm, Patient Position: Sitting, BP Cuff Size: Adult)   Pulse (!) 49   Temp 36.8 °C (98.3 °F) (Temporal)   Resp 12   Ht 1.727 m (5' 8\")   Wt 75.8 kg (167 lb)   SpO2 97%  Body mass index is 25.39 kg/m².    Hearing good.    Dentition good  Alert, oriented in no acute distress.  Eye contact is good, speech goal directed, affect calm  General:Fit, muscular  No distress.  Normal appearing.  HEENT: Pupils are equal.  Conjunctiva is normal.  Head is normal appearing.  Ears, canals and tympanic membranes are normal.  Oral cavity is pink and moist without lesion.  Neck: Supple without JVD or bruit.  Thyroid is not enlarged.  Pulmonary: Clear with good breath " sounds.  Cardiovascular regular rate and rhythm.  No murmur auscultated.  Carotid, radial and pedal pulses are intact.  Abdomen: Soft, nontender, nondistended.  Normal bowel sounds.  Organs are not enlarged.  Neurologic: Cranial nerves intact.  Strength and sensation are normal.  Normal patellar reflex.  Skin: No obvious lesions  Lymph: No cervical, supraclavicular, axillary, abdominal or inguinal adenopathy noted.      Assessment and Plan. The following treatment and monitoring plan is recommended:    1. Medicare annual wellness visit, subsequent        2. Essential hypertension        3. Hypercholesterolemia        4. Hypothyroidism (acquired)        5. Benign prostatic hyperplasia with nocturia        6. Psoriasis        7. Obstructive sleep apnea, mild (2022 polysomnography)        8. Abdominal aortic atherosclerosis (HCC)        9. Agatston CAC score 100-199            Very healthy 69-year-old male.  Chronic issues appear stable on current medication.  No change medications recommended.  Discussed AST may be related to medication or possibly side effect of muscle. I recommend three-month follow-upAnd if it remains elevated or trending higher and/or elevated ALT then consider discontinuing ezetimibe.  Prostate issues are stable. He expenses some nocturia.  Psoriasis is stable on topical dovonex    Services suggested: No services required at this time  Health Care Screening: Age-appropriate preventive services Medicare covers discussed today and ordered if indicated.  Referrals offered: Community-based lifestyle interventions to reduce health risks and promote self-management and wellness, fall prevention, nutrition, physical activity, tobacco-use cessation, weight loss, and mental health services as per orders if indicated.    Discussion today about general wellness and lifestyle habits:    Prevent falls and reduce trip hazards; Cautioned about securing or removing rugs.  Have a working fire alarm and carbon  monoxide detector;   Engage in regular physical activity and social activities       Follow-up: labs in 3 months

## 2024-05-03 ENCOUNTER — HOSPITAL ENCOUNTER (OUTPATIENT)
Facility: MEDICAL CENTER | Age: 69
End: 2024-05-03
Attending: INTERNAL MEDICINE
Payer: MEDICARE

## 2024-05-03 ENCOUNTER — NON-PROVIDER VISIT (OUTPATIENT)
Dept: INTERNAL MEDICINE | Facility: IMAGING CENTER | Age: 69
End: 2024-05-03
Payer: MEDICARE

## 2024-05-03 DIAGNOSIS — E78.00 HYPERCHOLESTEROLEMIA: ICD-10-CM

## 2024-05-03 DIAGNOSIS — I10 ESSENTIAL HYPERTENSION: ICD-10-CM

## 2024-05-03 DIAGNOSIS — E78.00 HYPERCHOLESTEROLEMIA: Primary | ICD-10-CM

## 2024-05-03 DIAGNOSIS — R79.89 ELEVATED LIVER FUNCTION TESTS: ICD-10-CM

## 2024-05-03 LAB
ALBUMIN SERPL BCP-MCNC: 4.2 G/DL (ref 3.2–4.9)
ALP SERPL-CCNC: 42 U/L (ref 30–99)
ALT SERPL-CCNC: 42 U/L (ref 2–50)
AST SERPL-CCNC: 55 U/L (ref 12–45)
BASOPHILS # BLD AUTO: 1.2 % (ref 0–1.8)
BASOPHILS # BLD: 0.06 K/UL (ref 0–0.12)
BILIRUB CONJ SERPL-MCNC: <0.2 MG/DL (ref 0.1–0.5)
BILIRUB INDIRECT SERPL-MCNC: ABNORMAL MG/DL (ref 0–1)
BILIRUB SERPL-MCNC: 0.7 MG/DL (ref 0.1–1.5)
EOSINOPHIL # BLD AUTO: 0.41 K/UL (ref 0–0.51)
EOSINOPHIL NFR BLD: 8.1 % (ref 0–6.9)
ERYTHROCYTE [DISTWIDTH] IN BLOOD BY AUTOMATED COUNT: 42.3 FL (ref 35.9–50)
HCT VFR BLD AUTO: 44.9 % (ref 42–52)
HGB BLD-MCNC: 15.1 G/DL (ref 14–18)
IMM GRANULOCYTES # BLD AUTO: 0.01 K/UL (ref 0–0.11)
IMM GRANULOCYTES NFR BLD AUTO: 0.2 % (ref 0–0.9)
LYMPHOCYTES # BLD AUTO: 1.54 K/UL (ref 1–4.8)
LYMPHOCYTES NFR BLD: 30.3 % (ref 22–41)
MCH RBC QN AUTO: 30 PG (ref 27–33)
MCHC RBC AUTO-ENTMCNC: 33.6 G/DL (ref 32.3–36.5)
MCV RBC AUTO: 89.1 FL (ref 81.4–97.8)
MONOCYTES # BLD AUTO: 0.68 K/UL (ref 0–0.85)
MONOCYTES NFR BLD AUTO: 13.4 % (ref 0–13.4)
NEUTROPHILS # BLD AUTO: 2.39 K/UL (ref 1.82–7.42)
NEUTROPHILS NFR BLD: 46.8 % (ref 44–72)
NRBC # BLD AUTO: 0 K/UL
NRBC BLD-RTO: 0 /100 WBC (ref 0–0.2)
PLATELET # BLD AUTO: 210 K/UL (ref 164–446)
PMV BLD AUTO: 11.8 FL (ref 9–12.9)
PROT SERPL-MCNC: 6.7 G/DL (ref 6–8.2)
RBC # BLD AUTO: 5.04 M/UL (ref 4.7–6.1)
WBC # BLD AUTO: 5.1 K/UL (ref 4.8–10.8)

## 2024-05-03 NOTE — PROGRESS NOTES
Adal Dobbins is a 69 y.o. male here for a non-provider visit for a lab draw on 5/3/2024 at 9:14 AM.    Procedure performed:  Venipuncture     Anatomical site:  Left Antecubital Area    Equipment used:  21 g vacutainer     Labs drawn:  Lipofit NMR, CBC, Hepatic function    Ordering provider:  Morteza Steen MD    Lab draw completed by:  Andreina Le R.N.

## 2024-05-07 LAB
CHOLEST SERPL-MCNC: 104 MG/DL
HDL PARTICAL NO Q4363: 29.8 UMOL/L
HDL SIZE Q4361: 9.4 NM
HDLC SERPL-MCNC: 54 MG/DL (ref 40–59)
HLD.LARGE SERPL-SCNC: 8 UMOL/L
L VLDL PART NO Q4357: <1.5 NMOL/L
LDL SERPL QN: 20.8 NM
LDL SERPL-SCNC: 536 NMOL/L
LDL SMALL SERPL-SCNC: 197 NMOL/L
LDLC SERPL CALC-MCNC: 38 MG/DL
PATHOLOGY STUDY: ABNORMAL
TRIGL SERPL-MCNC: 59 MG/DL (ref 30–149)
VLDL SIZE Q4362: 46.5 NM

## 2024-05-08 ENCOUNTER — APPOINTMENT (RX ONLY)
Dept: URBAN - METROPOLITAN AREA CLINIC 35 | Facility: CLINIC | Age: 69
Setting detail: DERMATOLOGY
End: 2024-05-08

## 2024-05-08 DIAGNOSIS — Z41.9 ENCOUNTER FOR PROCEDURE FOR PURPOSES OTHER THAN REMEDYING HEALTH STATE, UNSPECIFIED: ICD-10-CM

## 2024-05-08 PROCEDURE — ? FRAXEL RESTORE DUAL

## 2024-05-08 ASSESSMENT — LOCATION SIMPLE DESCRIPTION DERM: LOCATION SIMPLE: LEFT FOREHEAD

## 2024-05-08 ASSESSMENT — LOCATION ZONE DERM: LOCATION ZONE: FACE

## 2024-05-08 ASSESSMENT — LOCATION DETAILED DESCRIPTION DERM: LOCATION DETAILED: LEFT INFERIOR MEDIAL FOREHEAD

## 2024-05-08 NOTE — PROCEDURE: FRAXEL RESTORE DUAL
Topical Anesthesia?: 23% lidocaine, 7% tetracaine
Pre-Procedure Text: 1.5 hours numbing
Length Topical Anesthesia Applied (Optional): 50 minutes
Post-Care Instructions: I reviewed with the patient in detail post-care instructions.
Passes (Optional): 8
Treatment Level (Optional): 9
Treatment Number (Optional): 30
Eye Shielding Text (Leave Blank If Unwanted- Will Be Inserted If Selecting Eye Shields): The intraocular eye shields were placed. 2 drops of intraocular tetracaine HCL ophthalmic 0.5% solution was administered. The eye shields were coated with ophthalmic bacitracin prior to insertion. After the shields were removed the eyes were flushed with normal saline.
Consent: Written consent obtained, risks reviewed including but not limited to crusting, scabbing, blistering, scarring, darker or lighter pigmentary change, and/or incomplete removal.
Detail Level: Zone
Energy In Mj (Optional): 20
Price (Use Numbers Only, No Special Characters Or $): 5968

## 2024-05-09 DIAGNOSIS — E78.2 MIXED HYPERLIPIDEMIA WITH APOLIPOPROTEIN E3 VARIANT: ICD-10-CM

## 2024-05-09 RX ORDER — ROSUVASTATIN CALCIUM 20 MG/1
20 TABLET, COATED ORAL EVERY EVENING
Qty: 100 TABLET | Refills: 3 | Status: SHIPPED | OUTPATIENT
Start: 2024-05-09

## 2024-05-28 RX ORDER — BENAZEPRIL HYDROCHLORIDE 10 MG/1
10 TABLET ORAL DAILY
Qty: 90 TABLET | Refills: 3 | Status: SHIPPED | OUTPATIENT
Start: 2024-05-28

## 2024-06-24 ENCOUNTER — NON-PROVIDER VISIT (OUTPATIENT)
Dept: INTERNAL MEDICINE | Facility: IMAGING CENTER | Age: 69
End: 2024-06-24
Payer: MEDICARE

## 2024-06-24 ENCOUNTER — HOSPITAL ENCOUNTER (OUTPATIENT)
Facility: MEDICAL CENTER | Age: 69
End: 2024-06-24
Attending: INTERNAL MEDICINE
Payer: MEDICARE

## 2024-06-24 DIAGNOSIS — Z01.89 ENCOUNTER FOR ROUTINE LABORATORY TESTING: ICD-10-CM

## 2024-06-24 DIAGNOSIS — E78.00 HYPERCHOLESTEROLEMIA: ICD-10-CM

## 2024-06-24 LAB
ALBUMIN SERPL BCP-MCNC: 3.9 G/DL (ref 3.2–4.9)
ALP SERPL-CCNC: 50 U/L (ref 30–99)
ALT SERPL-CCNC: 27 U/L (ref 2–50)
AST SERPL-CCNC: 27 U/L (ref 12–45)
BILIRUB CONJ SERPL-MCNC: <0.2 MG/DL (ref 0.1–0.5)
BILIRUB INDIRECT SERPL-MCNC: NORMAL MG/DL (ref 0–1)
BILIRUB SERPL-MCNC: 0.5 MG/DL (ref 0.1–1.5)
PROT SERPL-MCNC: 6.4 G/DL (ref 6–8.2)

## 2024-06-24 PROCEDURE — 99999 PR NO CHARGE: CPT

## 2024-06-24 PROCEDURE — 80076 HEPATIC FUNCTION PANEL: CPT

## 2024-06-27 DIAGNOSIS — J45.20 MILD INTERMITTENT ASTHMA WITHOUT COMPLICATION: ICD-10-CM

## 2024-06-27 RX ORDER — FLUTICASONE PROPIONATE 110 UG/1
1 AEROSOL, METERED RESPIRATORY (INHALATION) 2 TIMES DAILY
Qty: 1 EACH | Refills: 6 | Status: SHIPPED | OUTPATIENT
Start: 2024-06-27

## 2024-07-03 ENCOUNTER — APPOINTMENT (RX ONLY)
Dept: URBAN - METROPOLITAN AREA CLINIC 35 | Facility: CLINIC | Age: 69
Setting detail: DERMATOLOGY
End: 2024-07-03

## 2024-07-03 DIAGNOSIS — Z41.9 ENCOUNTER FOR PROCEDURE FOR PURPOSES OTHER THAN REMEDYING HEALTH STATE, UNSPECIFIED: ICD-10-CM

## 2024-07-03 PROCEDURE — ? FRAXEL RESTORE DUAL

## 2024-07-03 ASSESSMENT — LOCATION SIMPLE DESCRIPTION DERM: LOCATION SIMPLE: LEFT FOREHEAD

## 2024-07-03 ASSESSMENT — LOCATION ZONE DERM: LOCATION ZONE: FACE

## 2024-07-03 ASSESSMENT — LOCATION DETAILED DESCRIPTION DERM: LOCATION DETAILED: LEFT INFERIOR MEDIAL FOREHEAD

## 2024-07-03 NOTE — PROCEDURE: FRAXEL RESTORE DUAL
Topical Anesthesia?: 23% lidocaine, 7% tetracaine
Pre-Procedure Text: 1.5 hours numbing
Length Topical Anesthesia Applied (Optional): 50 minutes
Post-Care Instructions: I reviewed with the patient in detail post-care instructions.
Passes (Optional): 8
Actual Kj Used (Optional): 2.95
Treatment Level (Optional): 9
Treatment Number (Optional): 31
Eye Shielding Text (Leave Blank If Unwanted- Will Be Inserted If Selecting Eye Shields): The intraocular eye shields were placed. 2 drops of intraocular tetracaine HCL ophthalmic 0.5% solution was administered. The eye shields were coated with ophthalmic bacitracin prior to insertion. After the shields were removed the eyes were flushed with normal saline.
Consent: Written consent obtained, risks reviewed including but not limited to crusting, scabbing, blistering, scarring, darker or lighter pigmentary change, and/or incomplete removal.
Detail Level: Zone
Energy In Mj (Optional): 20
Price (Use Numbers Only, No Special Characters Or $): 0868

## 2024-07-03 NOTE — HPI: COSMETIC (LASER RESURFACING)
Have You Had Laser Resurfacing Before?: has had previous treatments
When Was Your Last Laser Resurfacing Treatment?: 05/08/2024

## 2024-07-05 ENCOUNTER — HOSPITAL ENCOUNTER (OUTPATIENT)
Dept: CARDIOLOGY | Facility: MEDICAL CENTER | Age: 69
End: 2024-07-05
Attending: INTERNAL MEDICINE
Payer: MEDICARE

## 2024-07-05 DIAGNOSIS — R01.1 HEART MURMUR: ICD-10-CM

## 2024-07-05 LAB
LV EJECT FRACT  99904: 60
LV EJECT FRACT MOD 2C 99903: 64.38
LV EJECT FRACT MOD 4C 99902: 54.01
LV EJECT FRACT MOD BP 99901: 59

## 2024-07-05 PROCEDURE — 93306 TTE W/DOPPLER COMPLETE: CPT | Mod: 26 | Performed by: INTERNAL MEDICINE

## 2024-07-05 PROCEDURE — 93306 TTE W/DOPPLER COMPLETE: CPT

## 2024-07-18 DIAGNOSIS — E03.9 HYPOTHYROIDISM (ACQUIRED): ICD-10-CM

## 2024-07-18 RX ORDER — LEVOTHYROXINE SODIUM 125 MCG
TABLET ORAL
Qty: 90 TABLET | Refills: 3 | Status: SHIPPED | OUTPATIENT
Start: 2024-07-18

## 2024-07-29 ENCOUNTER — HOSPITAL ENCOUNTER (OUTPATIENT)
Facility: MEDICAL CENTER | Age: 69
End: 2024-07-29
Attending: INTERNAL MEDICINE
Payer: MEDICARE

## 2024-07-29 ENCOUNTER — NON-PROVIDER VISIT (OUTPATIENT)
Dept: INTERNAL MEDICINE | Facility: IMAGING CENTER | Age: 69
End: 2024-07-29
Payer: MEDICARE

## 2024-07-29 DIAGNOSIS — E78.00 HYPERCHOLESTEROLEMIA: ICD-10-CM

## 2024-07-29 DIAGNOSIS — R79.89 ELEVATED LIVER FUNCTION TESTS: ICD-10-CM

## 2024-07-29 LAB
ALBUMIN SERPL BCP-MCNC: 3.9 G/DL (ref 3.2–4.9)
ALP SERPL-CCNC: 45 U/L (ref 30–99)
ALT SERPL-CCNC: 25 U/L (ref 2–50)
AST SERPL-CCNC: 32 U/L (ref 12–45)
BILIRUB CONJ SERPL-MCNC: <0.2 MG/DL (ref 0.1–0.5)
BILIRUB INDIRECT SERPL-MCNC: NORMAL MG/DL (ref 0–1)
BILIRUB SERPL-MCNC: 0.6 MG/DL (ref 0.1–1.5)
PROT SERPL-MCNC: 6.6 G/DL (ref 6–8.2)

## 2024-07-29 PROCEDURE — 83695 ASSAY OF LIPOPROTEIN(A): CPT

## 2024-07-29 PROCEDURE — 80061 LIPID PANEL: CPT

## 2024-07-29 PROCEDURE — 80076 HEPATIC FUNCTION PANEL: CPT

## 2024-07-29 PROCEDURE — 83704 LIPOPROTEIN BLD QUAN PART: CPT

## 2024-08-01 LAB
CHOLEST SERPL-MCNC: 172 MG/DL
HDL PARTICAL NO Q4363: 34.1 UMOL/L
HDL SIZE Q4361: 10.2 NM
HDLC SERPL-MCNC: 80 MG/DL (ref 40–59)
HLD.LARGE SERPL-SCNC: 15.3 UMOL/L
L VLDL PART NO Q4357: <1.5 NMOL/L
LDL SERPL QN: 20.9 NM
LDL SERPL-SCNC: 883 NMOL/L
LDL SMALL SERPL-SCNC: <165 NMOL/L
LDLC SERPL CALC-MCNC: 78 MG/DL
LPA SERPL-MCNC: 23 MG/DL
PATHOLOGY STUDY: ABNORMAL
TRIGL SERPL-MCNC: 55 MG/DL (ref 30–149)
VLDL SIZE Q4362: 43.4 NM

## 2024-08-21 ENCOUNTER — HOSPITAL ENCOUNTER (OUTPATIENT)
Facility: MEDICAL CENTER | Age: 69
End: 2024-08-21
Attending: UROLOGY
Payer: MEDICARE

## 2024-08-21 ENCOUNTER — NON-PROVIDER VISIT (OUTPATIENT)
Dept: INTERNAL MEDICINE | Facility: IMAGING CENTER | Age: 69
End: 2024-08-21
Payer: MEDICARE

## 2024-08-21 LAB — PSA SERPL-MCNC: 6.71 NG/ML (ref 0–4)

## 2024-08-21 PROCEDURE — 84153 ASSAY OF PSA TOTAL: CPT | Mod: GA

## 2024-08-21 PROCEDURE — 99999 PR NO CHARGE: CPT

## 2024-08-21 NOTE — PROGRESS NOTES
Adal Dobbins is a 69 y.o. male here for a non-provider visit for a lab draw on 8/21/2024 at 9:01 AM.    Procedure performed:  Venipuncture     Anatomical site:  Left Antecubital Area    Equipment used:  21 g vacutainer     Labs drawn:  PSA    Ordering provider:  Robin    Lab draw completed by:  Andreina Le R.N.

## 2024-08-22 ENCOUNTER — APPOINTMENT (RX ONLY)
Dept: URBAN - METROPOLITAN AREA CLINIC 35 | Facility: CLINIC | Age: 69
Setting detail: DERMATOLOGY
End: 2024-08-22

## 2024-08-22 DIAGNOSIS — Z41.9 ENCOUNTER FOR PROCEDURE FOR PURPOSES OTHER THAN REMEDYING HEALTH STATE, UNSPECIFIED: ICD-10-CM

## 2024-08-22 PROCEDURE — ? ADDITIONAL NOTES

## 2024-08-22 PROCEDURE — ? BOTOX

## 2024-08-22 NOTE — PROCEDURE: BOTOX
Right Periorbital Skin Units: 0
Consent: Verbal and written informed consent were obtained to include the following risks: pain, swelling, bruising, eyelid or eyebrow droop, and lack of visible improvement of wrinkles in the areas treated.  The skin was cleansed with alcohol. Injections were administered with a 32g needle into the following areas:
Additional Area 3 Units: 26
Dilution (U/0.1 Cc): 1.1
Additional Area 3 Location: Frontalis
Lot #: [id number]
Detail Level: Detailed
Additional Area 2 Units: 73
Additional Area 5 Location: perioral
Additional Area 1 Location: Glabella
Expiration Date (Month Year): 2026-09
Additional Area 6 Location: platysma
Price (Use Numbers Only, No Special Characters Or $): 6998
Post-Care Instructions: Patient instructed to not lie down for 4 hours after injections and limit physical activity for 24 hours.
Additional Area 4 Location: Bunny lines
Additional Area 2 Location: Crows Feet

## 2024-09-11 ENCOUNTER — APPOINTMENT (RX ONLY)
Dept: URBAN - METROPOLITAN AREA CLINIC 35 | Facility: CLINIC | Age: 69
Setting detail: DERMATOLOGY
End: 2024-09-11

## 2024-09-11 DIAGNOSIS — J45.20 MILD INTERMITTENT ASTHMA WITHOUT COMPLICATION: ICD-10-CM

## 2024-09-11 DIAGNOSIS — Z41.9 ENCOUNTER FOR PROCEDURE FOR PURPOSES OTHER THAN REMEDYING HEALTH STATE, UNSPECIFIED: ICD-10-CM

## 2024-09-11 PROCEDURE — ? FRAXEL RESTORE DUAL

## 2024-09-11 RX ORDER — ALBUTEROL SULFATE 90 UG/1
2 INHALANT RESPIRATORY (INHALATION) EVERY 6 HOURS PRN
Qty: 19 G | Refills: 5 | Status: SHIPPED | OUTPATIENT
Start: 2024-09-11 | End: 2024-09-12 | Stop reason: ALTCHOICE

## 2024-09-11 ASSESSMENT — LOCATION SIMPLE DESCRIPTION DERM: LOCATION SIMPLE: LEFT FOREHEAD

## 2024-09-11 ASSESSMENT — LOCATION DETAILED DESCRIPTION DERM: LOCATION DETAILED: LEFT INFERIOR MEDIAL FOREHEAD

## 2024-09-11 ASSESSMENT — LOCATION ZONE DERM: LOCATION ZONE: FACE

## 2024-09-11 NOTE — HPI: COSMETIC (LASER RESURFACING)
Have You Had Laser Resurfacing Before?: has had previous treatments
When Was Your Last Laser Resurfacing Treatment?: 07/03/2024

## 2024-09-11 NOTE — PROCEDURE: FRAXEL RESTORE DUAL
Topical Anesthesia?: 23% lidocaine, 7% tetracaine
Pre-Procedure Text: 1.5 hours numbing
Length Topical Anesthesia Applied (Optional): 50 minutes
Post-Care Instructions: I reviewed with the patient in detail post-care instructions.
Passes (Optional): 8
Actual Kj Used (Optional): 3.32
Treatment Number (Optional): 32
Eye Shielding Text (Leave Blank If Unwanted- Will Be Inserted If Selecting Eye Shields): The intraocular eye shields were placed. 2 drops of intraocular tetracaine HCL ophthalmic 0.5% solution was administered. The eye shields were coated with ophthalmic bacitracin prior to insertion. After the shields were removed the eyes were flushed with normal saline.
Consent: Written consent obtained, risks reviewed including but not limited to crusting, scabbing, blistering, scarring, darker or lighter pigmentary change, and/or incomplete removal.
Detail Level: Zone
Energy In Mj (Optional): 20
Price (Use Numbers Only, No Special Characters Or $): 1888

## 2024-09-12 RX ORDER — ALBUTEROL SULFATE 90 UG/1
2 AEROSOL, METERED RESPIRATORY (INHALATION) EVERY 6 HOURS PRN
Qty: 18 G | Refills: 5 | Status: SHIPPED | OUTPATIENT
Start: 2024-09-12

## 2024-09-16 ENCOUNTER — APPOINTMENT (RX ONLY)
Dept: URBAN - METROPOLITAN AREA CLINIC 35 | Facility: CLINIC | Age: 69
Setting detail: DERMATOLOGY
End: 2024-09-16

## 2024-09-16 DIAGNOSIS — D485 NEOPLASM OF UNCERTAIN BEHAVIOR OF SKIN: ICD-10-CM

## 2024-09-16 PROBLEM — D48.5 NEOPLASM OF UNCERTAIN BEHAVIOR OF SKIN: Status: ACTIVE | Noted: 2024-09-16

## 2024-09-16 PROCEDURE — 11102 TANGNTL BX SKIN SINGLE LES: CPT

## 2024-09-16 PROCEDURE — ? BIOPSY BY SHAVE METHOD

## 2024-09-16 ASSESSMENT — LOCATION DETAILED DESCRIPTION DERM: LOCATION DETAILED: RIGHT MEDIAL ZYGOMA

## 2024-09-16 ASSESSMENT — LOCATION SIMPLE DESCRIPTION DERM: LOCATION SIMPLE: RIGHT ZYGOMA

## 2024-09-16 ASSESSMENT — LOCATION ZONE DERM: LOCATION ZONE: FACE

## 2024-09-16 NOTE — HPI: SKIN LESION
Is This A New Presentation, Or A Follow-Up?: Skin Lesion
What Type Of Note Output Would You Prefer (Optional)?: Standard Output
How Severe Is Your Skin Lesion?: mild
Has Your Skin Lesion Been Treated?: not been treated
Additional History: Lesion noticed by Dr. Barragan during fraxel laser treatment.

## 2024-09-24 ENCOUNTER — APPOINTMENT (RX ONLY)
Dept: URBAN - METROPOLITAN AREA CLINIC 35 | Facility: CLINIC | Age: 69
Setting detail: DERMATOLOGY
End: 2024-09-24

## 2024-09-24 DIAGNOSIS — L57.0 ACTINIC KERATOSIS: ICD-10-CM

## 2024-09-24 PROCEDURE — 17000 DESTRUCT PREMALG LESION: CPT

## 2024-09-24 PROCEDURE — ? LIQUID NITROGEN

## 2024-09-24 ASSESSMENT — LOCATION DETAILED DESCRIPTION DERM: LOCATION DETAILED: RIGHT INFERIOR TEMPLE

## 2024-09-24 ASSESSMENT — LOCATION ZONE DERM: LOCATION ZONE: FACE

## 2024-09-24 ASSESSMENT — LOCATION SIMPLE DESCRIPTION DERM: LOCATION SIMPLE: RIGHT TEMPLE

## 2024-09-24 NOTE — PROCEDURE: LIQUID NITROGEN
Render Note In Bullet Format When Appropriate: No
Post-Care Instructions: I reviewed with the patient in detail post-care instructions. Patient is to wear sunprotection, and avoid picking at any of the treated lesions. Pt may apply Vaseline to crusted or scabbing areas.
Detail Level: Detailed
Duration Of Freeze Thaw-Cycle (Seconds): 10
Consent: The patient's consent was obtained including but not limited to risks of crusting, scabbing, blistering, scarring, darker or lighter pigmentary change, recurrence, incomplete removal and infection.
Show Aperture Variable?: Yes
Number Of Freeze-Thaw Cycles: 1 freeze-thaw cycle

## 2024-10-04 ENCOUNTER — NON-PROVIDER VISIT (OUTPATIENT)
Dept: INTERNAL MEDICINE | Facility: IMAGING CENTER | Age: 69
End: 2024-10-04
Payer: MEDICARE

## 2024-10-04 ENCOUNTER — HOSPITAL ENCOUNTER (OUTPATIENT)
Facility: MEDICAL CENTER | Age: 69
End: 2024-10-04
Attending: INTERNAL MEDICINE
Payer: MEDICARE

## 2024-10-04 DIAGNOSIS — E78.2 MIXED HYPERLIPIDEMIA WITH APOLIPOPROTEIN E3 VARIANT: ICD-10-CM

## 2024-10-04 DIAGNOSIS — E03.9 HYPOTHYROIDISM, UNSPECIFIED TYPE: ICD-10-CM

## 2024-10-04 DIAGNOSIS — Z23 NEED FOR INFLUENZA VACCINATION: ICD-10-CM

## 2024-10-04 LAB
ALBUMIN SERPL BCP-MCNC: 4.1 G/DL (ref 3.2–4.9)
ALBUMIN/GLOB SERPL: 1.5 G/DL
ALP SERPL-CCNC: 43 U/L (ref 30–99)
ALT SERPL-CCNC: 27 U/L (ref 2–50)
ANION GAP SERPL CALC-SCNC: 10 MMOL/L (ref 7–16)
AST SERPL-CCNC: 27 U/L (ref 12–45)
BILIRUB SERPL-MCNC: 0.6 MG/DL (ref 0.1–1.5)
BUN SERPL-MCNC: 22 MG/DL (ref 8–22)
CALCIUM ALBUM COR SERPL-MCNC: 9 MG/DL (ref 8.5–10.5)
CALCIUM SERPL-MCNC: 9.1 MG/DL (ref 8.5–10.5)
CHLORIDE SERPL-SCNC: 105 MMOL/L (ref 96–112)
CO2 SERPL-SCNC: 25 MMOL/L (ref 20–33)
CREAT SERPL-MCNC: 0.84 MG/DL (ref 0.5–1.4)
CRP SERPL HS-MCNC: <0.3 MG/DL (ref 0–0.75)
GFR SERPLBLD CREATININE-BSD FMLA CKD-EPI: 95 ML/MIN/1.73 M 2
GLOBULIN SER CALC-MCNC: 2.7 G/DL (ref 1.9–3.5)
GLUCOSE SERPL-MCNC: 90 MG/DL (ref 65–99)
POTASSIUM SERPL-SCNC: 4.3 MMOL/L (ref 3.6–5.5)
PROT SERPL-MCNC: 6.8 G/DL (ref 6–8.2)
SODIUM SERPL-SCNC: 140 MMOL/L (ref 135–145)
T4 SERPL-MCNC: 6.7 UG/DL (ref 4–12)
TSH SERPL-ACNC: 2.43 UIU/ML (ref 0.35–5.5)

## 2024-10-04 PROCEDURE — 86140 C-REACTIVE PROTEIN: CPT

## 2024-10-04 PROCEDURE — G0008 ADMIN INFLUENZA VIRUS VAC: HCPCS | Performed by: INTERNAL MEDICINE

## 2024-10-04 PROCEDURE — 84436 ASSAY OF TOTAL THYROXINE: CPT

## 2024-10-04 PROCEDURE — 84443 ASSAY THYROID STIM HORMONE: CPT

## 2024-10-04 PROCEDURE — 83704 LIPOPROTEIN BLD QUAN PART: CPT

## 2024-10-04 PROCEDURE — 80053 COMPREHEN METABOLIC PANEL: CPT

## 2024-10-04 PROCEDURE — 90662 IIV NO PRSV INCREASED AG IM: CPT | Performed by: INTERNAL MEDICINE

## 2024-10-04 PROCEDURE — 80061 LIPID PANEL: CPT | Mod: XU

## 2024-10-04 PROCEDURE — 82172 ASSAY OF APOLIPOPROTEIN: CPT

## 2024-10-06 LAB — APO B100 SERPL-MCNC: 75 MG/DL (ref 66–133)

## 2024-10-07 LAB
CHOLEST SERPL-MCNC: 173 MG/DL
HDL PARTICAL NO Q4363: 36.8 UMOL/L
HDL SIZE Q4361: 9.4 NM
HDLC SERPL-MCNC: 68 MG/DL (ref 40–59)
HLD.LARGE SERPL-SCNC: 10 UMOL/L
L VLDL PART NO Q4357: <1.5 NMOL/L
LDL SERPL QN: 20.8 NM
LDL SERPL-SCNC: 1165 NMOL/L
LDL SMALL SERPL-SCNC: 275 NMOL/L
LDLC SERPL CALC-MCNC: 88 MG/DL
PATHOLOGY STUDY: ABNORMAL
TRIGL SERPL-MCNC: 85 MG/DL (ref 30–149)
VLDL SIZE Q4362: 38.5 NM

## 2024-10-29 ENCOUNTER — HOSPITAL ENCOUNTER (OUTPATIENT)
Dept: RADIOLOGY | Facility: MEDICAL CENTER | Age: 69
End: 2024-10-29
Attending: UROLOGY
Payer: MEDICARE

## 2024-10-29 DIAGNOSIS — R97.20 ELEVATED PROSTATE SPECIFIC ANTIGEN (PSA): ICD-10-CM

## 2024-10-29 PROCEDURE — 700111 HCHG RX REV CODE 636 W/ 250 OVERRIDE (IP): Mod: JZ,JG | Performed by: RADIOLOGY

## 2024-10-29 PROCEDURE — 700117 HCHG RX CONTRAST REV CODE 255: Mod: JZ | Performed by: UROLOGY

## 2024-10-29 PROCEDURE — 72197 MRI PELVIS W/O & W/DYE: CPT

## 2024-10-29 PROCEDURE — A9579 GAD-BASE MR CONTRAST NOS,1ML: HCPCS | Mod: JZ | Performed by: UROLOGY

## 2024-10-29 RX ADMIN — GADOTERIDOL 15 ML: 279.3 INJECTION, SOLUTION INTRAVENOUS at 09:16

## 2024-10-29 RX ADMIN — GLUCAGON 1 MG: 1 INJECTION, POWDER, LYOPHILIZED, FOR SOLUTION INTRAMUSCULAR; INTRAVENOUS at 07:35

## 2024-11-05 ENCOUNTER — APPOINTMENT (RX ONLY)
Dept: URBAN - METROPOLITAN AREA CLINIC 35 | Facility: CLINIC | Age: 69
Setting detail: DERMATOLOGY
End: 2024-11-05

## 2024-11-05 DIAGNOSIS — Z41.9 ENCOUNTER FOR PROCEDURE FOR PURPOSES OTHER THAN REMEDYING HEALTH STATE, UNSPECIFIED: ICD-10-CM

## 2024-11-05 PROCEDURE — ? BOTOX

## 2024-11-05 PROCEDURE — ? ADDITIONAL NOTES

## 2024-11-05 NOTE — PROCEDURE: BOTOX
Right Periorbital Skin Units: 0
Consent: Verbal and written informed consent were obtained to include the following risks: pain, swelling, bruising, eyelid or eyebrow droop, and lack of visible improvement of wrinkles in the areas treated.  The skin was cleansed with alcohol. Injections were administered with a 32g needle into the following areas:
Additional Area 3 Units: 26
Dilution (U/0.1 Cc): 1.1
Additional Area 3 Location: Frontalis
Lot #: [id number]
Detail Level: Detailed
Additional Area 2 Units: 73
Additional Area 5 Location: perioral
Additional Area 1 Location: Glabella
Expiration Date (Month Year): 2026-10
Additional Area 6 Location: platysma
Price (Use Numbers Only, No Special Characters Or $): 5098
Post-Care Instructions: Patient instructed to not lie down for 4 hours after injections and limit physical activity for 24 hours.
Additional Area 4 Location: Bunny lines
Additional Area 2 Location: Crows Feet

## 2024-11-07 ENCOUNTER — TELEPHONE (OUTPATIENT)
Dept: VASCULAR LAB | Facility: MEDICAL CENTER | Age: 69
End: 2024-11-07

## 2024-11-07 ENCOUNTER — OFFICE VISIT (OUTPATIENT)
Dept: CARDIOLOGY | Facility: MEDICAL CENTER | Age: 69
End: 2024-11-07
Attending: INTERNAL MEDICINE
Payer: MEDICARE

## 2024-11-07 VITALS
HEIGHT: 68 IN | SYSTOLIC BLOOD PRESSURE: 126 MMHG | DIASTOLIC BLOOD PRESSURE: 78 MMHG | BODY MASS INDEX: 26.07 KG/M2 | HEART RATE: 57 BPM | WEIGHT: 172 LBS

## 2024-11-07 DIAGNOSIS — E78.41 ELEVATED LP(A): ICD-10-CM

## 2024-11-07 DIAGNOSIS — E78.5 DYSLIPIDEMIA: ICD-10-CM

## 2024-11-07 DIAGNOSIS — R93.1 AGATSTON CAC SCORE, >400: ICD-10-CM

## 2024-11-07 DIAGNOSIS — I10 HYPERTENSION, UNSPECIFIED TYPE: ICD-10-CM

## 2024-11-07 DIAGNOSIS — E03.9 HYPOTHYROIDISM, UNSPECIFIED TYPE: ICD-10-CM

## 2024-11-07 PROCEDURE — G2211 COMPLEX E/M VISIT ADD ON: HCPCS | Performed by: INTERNAL MEDICINE

## 2024-11-07 PROCEDURE — 3074F SYST BP LT 130 MM HG: CPT | Performed by: INTERNAL MEDICINE

## 2024-11-07 PROCEDURE — 99214 OFFICE O/P EST MOD 30 MIN: CPT | Performed by: INTERNAL MEDICINE

## 2024-11-07 PROCEDURE — 99212 OFFICE O/P EST SF 10 MIN: CPT

## 2024-11-07 PROCEDURE — 3078F DIAST BP <80 MM HG: CPT | Performed by: INTERNAL MEDICINE

## 2024-11-07 RX ORDER — BEMPEDOIC ACID 180 MG/1
180 TABLET, FILM COATED ORAL DAILY
Qty: 30 TABLET | Refills: 11 | Status: SHIPPED | OUTPATIENT
Start: 2024-11-07

## 2024-11-07 ASSESSMENT — FIBROSIS 4 INDEX: FIB4 SCORE: 1.66

## 2024-11-07 NOTE — PROGRESS NOTES
"[facility] follow up  Visit  Date of Service: 11/07/24    Patient here for f/u of dyslipidemia    Subjective    HPI  History of ASCVD: No  Other Established (non-atherosclerotic) Vascular Disease, if Present: None  Age at Initial Diagnosis of Dyslipidemia: 50s    Current Prescription Lipid Lowering Medications - including dose:   Statin: Rosuvastatin 20 mg  Non-Statin: stopped ezetimibe at direction of PCP for modestly elevated LFTs  Current Lipid Lowering and Related Supplements:   Aspirin 81 mg a day  Vitamin D  Coq.10  Any Current Side Effects Potentially Related to Lipid Lowering therapy?   No  Current Adherence to Lipid Lowering Therapies   Complete  Previously Attempted Interventions for Lipids - including outcome  Statin: None    Outcome: N/A  Non-Statin: Ezetimibe   Outcome: modestly elevated LFTs  Any Previous History of Statin Intolerance?   No  Baseline Lipids Prior to Treatment:   Unknown or unavailable - only on treatment blood work available  Other Pertinent History:   Excellent exercise tolerance  No angina  Remains on same dose levothyroxine -good energy level  History of other CV risk factors:   He is down to 10 mg of benazepril a day  Home readings in the 110s to 120s  History of cigarette and cigar smoking - none recently  No fam history of premature ASCVD    FAMILY HISTORY: High cholesterol and high bp but no ascvd in first degree relatives on mothers side. Does not know fathers histoyr    SOCIAL HISTORY   Social History     Tobacco Use   Smoking Status Light Smoker    Types: Cigars   Smokeless Tobacco Never   Tobacco Comments    1 cigar per week     Change in weight: Has maintained his weight loss  Exercise habits: strenuous regular exercise program  Diet: med style - very consistent        Objective      Vitals:    11/07/24 1428   BP: 126/78   BP Location: Left arm   Patient Position: Sitting   BP Cuff Size: Adult   Pulse: (!) 57   Weight: 78 kg (172 lb)   Height: 1.727 m (5' 8\")    "   Physical Exam  Vitals reviewed.   Constitutional:       General: He is not in acute distress.     Appearance: He is well-developed. He is not toxic-appearing or diaphoretic.   HENT:      Head: Normocephalic and atraumatic.   Eyes:      General: No scleral icterus.     Extraocular Movements: Extraocular movements intact.      Conjunctiva/sclera: Conjunctivae normal.   Neck:      Thyroid: No thyromegaly.      Vascular: No carotid bruit or JVD.   Cardiovascular:      Rate and Rhythm: Normal rate and regular rhythm.      Heart sounds: Murmur heard.      No friction rub. No gallop.   Pulmonary:      Effort: Pulmonary effort is normal. No respiratory distress.      Breath sounds: Normal breath sounds. No wheezing or rales.   Musculoskeletal:      Right lower leg: No edema.      Left lower leg: No edema.   Skin:     Coloration: Skin is not pale.   Neurological:      General: No focal deficit present.      Mental Status: He is alert and oriented to person, place, and time.      Cranial Nerves: No cranial nerve deficit.      Coordination: Coordination normal.      Gait: Gait normal.   Psychiatric:         Mood and Affect: Mood normal.         Behavior: Behavior normal.         DATA REVIEW:  Most Recent Lipid Panel:   Lab Results   Component Value Date    CHOLSTRLTOT 173 10/04/2024    CHOLSTRLTOT 152 04/27/2023    TRIGLYCERIDE 85 10/04/2024    TRIGLYCERIDE 63 04/27/2023    HDL 68 (H) 10/04/2024    HDL 54 04/27/2023    LDL 85 04/27/2023   LDL C -88  LDLP - 1165    Lab Results   Component Value Date/Time    LIPOPROTA 23 07/29/2024 08:05 AM      Lab Results   Component Value Date/Time    APOB 75 10/04/2024 08:15 AM      Lab Results   Component Value Date/Time    CRPHIGHSEN 0.2 04/25/2024 08:00 AM      Other Pertinent Blood Work:   Lab Results   Component Value Date    SODIUM 140 10/04/2024    POTASSIUM 4.3 10/04/2024    CHLORIDE 105 10/04/2024    CO2 25 10/04/2024    ANION 10.0 10/04/2024    GLUCOSE 90 10/04/2024    BUN 22  10/04/2024    CREATININE 0.84 10/04/2024    CALCIUM 9.1 10/04/2024    ASTSGOT 27 10/04/2024    ALTSGPT 27 10/04/2024    ALKPHOSPHAT 43 10/04/2024    TBILIRUBIN 0.6 10/04/2024    ALBUMIN 4.1 10/04/2024    AGRATIO 1.5 10/04/2024    CRPHIGHSEN 0.2 04/25/2024    CREACTPROT <0.30 10/04/2024    LIPOPROTA 23 07/29/2024    TSHULTRASEN 2.430 10/04/2024    CPKTOTAL 223 (H) 03/29/2023       CAC 2014  LMA - 0.0   LCX - 0.0   LAD - 102.8   RCA - 0.0   PDA - 30.5  Calcium Score:  133.3     vasc screen and cimt 2017  Mild carotid plaque  No aaa  Normal bee  Mean cimt 0.719  [facility] age 62    Stress Echo 2018  Negative stress echocardiogram for ischemia with adequate stress achieved by   heart rate criteria.     vasc screen and cimt 2018  Mild carotid plaque  No aaa  Normal bee  Mean cimt 0.719  [facility] age 63    CAC score 2022  LMA - 0.0  LCX - 0.0  LAD - 469.2  RCA - 117.9  PDA - 587.1  Total Calcium Score: 587.1    vasc screen and cimt April 2022  Mild carotid plaque  No aaa  Normal bee  Mean cimt 0.758  [facility] age 67    MPI april 2022   No evidence of significant jeopardized viable myocardium or prior myocardial    infarction.   Normal left ventricular size, ejection fraction, and wall motion.    CIMT and vasc screen april 2023   Moderate plaque of the carotid bifurcation with velocities consistent with less than 50% stenosis of the internal carotid.    No aneurysm of the abdominal aorta.    ABIs are increased bilaterally consistent with vessel hardening, limiting evaluation.  Composite mean C IMT 0.736  Vascular age 64    CMT and vascular screen April 2024  Mild carotid plaque bilaterally without significant stenosis  Normal BEE  No AAA  Mean CIMT 0.832  Vascular age 79        ASSESSMENT AND PLAN  Patient Type, check all that apply:   Primary Prevention  Established Atherosclerotic Cardiovascular Disease (ASCVD)  Subclinical ASCVD as evidenced by his elevated coronary calcium score, elevated C IMT, and mild carotid atheroma seen on  vascular screen.  As we previously discussed at length, coronary calcium scores are not meant to be followed serially and his worsening in coronary calcium score between 2014 and 2022 is as likely to be the result of calcification and stabilization of pre-existing plaque rather than progression of atherosclerosis.  CMT and vascular screen are a much better way to follow progression   Overall, I think picture is most consistent with the development of early atherosclerosis in his 40s and 50s with stabilization of existing plaque with better lifestyle and lipid-lowering therapy over the last 5 to 10 years  There is no evidence of ischemia on MPI and by symptoms so I do not think there is any reason to proceed with coronary CTA unless he develops symptoms.  This is an indication for aggressive medical management and we can follow his progression with C IMT and vascular screen serially.  I would not recommend he gets another coronary artery calcium score  His CIMT appears worse than previous in 2023 -unclear if this is artifact  Other Established (non-atherosclerotic) Vascular Disease, if Present:  None  Evidence of Heterozygous Familial Hypercholesterolemia (FH):   No   ACC/AHA Indication for Statin Therapy, jose all that apply:  Baseline Calculated ASCVD risk >7.5%: Indication for Moderate intensity statin  Calculated Risk for ASCVD, if applicable    9.7 % - likely an underestimation given subclinical ascvd  Other Significant Risk Markers, if any, jose all that apply   Subclinical atherosclerosis on CAC scoring  Subclinical atherosclerosis on vascular screen and C IMT -has been stable but worse on most recent imaging in 2023  Mildly elevated lipoprotein a  Favorable triglycerides, HDL, and LDL particle size  Favorable CRP   Treatment goals based on shared decision making  Using shared decision making we decided to treat to aggressively and target an LDL C less than 70, APO B less than 70, and LDL particle number at  least less than 900 -  Previously much improved control with addition of ezetimibe  Now with poor control off ezetimibe  Lifestyle Recommendations From Today’s Visit:    Continue regular exercise  Continue maintain weight  Continue very heart healthy diet low in saturated fat  Statin Therapy Recommendations from Today’s Visit:   Continue rosuvastatin to 20 mg daily - can consider dose increase to 40 mg if LFTs remains stable  Non-Statin Medications Recommendations from Today’s Visit:   Off ezetimibe due to modest increase in LFTs which resolved with d/c. Could consider another trial in future  -start nexletol 180 mg daily   Indication for PCSK9 Inhibitor, if applicable:  Not currently indicated -but could consider if his atherosclerosis worsens   Supplements Recommended at this visit:   Continue vitamin D and coq.10 to help with statin tolerability  Recommendations for Other Cardiovascular Risk Factors, jose all that apply:   -Hypertension -under excellent control both in the office and at home despite decreasing doses of benazepril.  Continue benazepril 10 mg daily.  Continue home blood pressure monitoring  -History of smoking -recommend continued avoidance of all tobacco products  -Antiplatelet therapy -reasonable to continue low-dose aspirin given his subclinical CAD and low bleeding risk  Other Issues:  - hypothyroidism -asymptomatic.  Seems under reasonable control per symptoms and TSH.  Continue current dose levothyroxine. Recheck TSH T4 with next blood work.  Otherwise defer further management to PCP    Future Studies: Repeat cimt and vascular screen april 2025  Blood Work Ordered At Today’s visit: NMR, apoB, cmp,   Follow-Up: 2 months to ensure LFTs don't increase on nexletol    Michael J Bloch, M.D.    CC:  DARIA Steen M.D.

## 2024-11-07 NOTE — TELEPHONE ENCOUNTER
Received New start PA request via [employer]  for NEXLETOL 180MG TABLETS. (Quantity:30, Day Supply:30)     Insurance: [employer]   Member ID:  [id number]  BIN: [id number]  PCN: [id number]  Group: [id number]     Ran Test claim via [place] [facility] medication Rejects stating prior authorization is required.    ABEL Villatoro, PhT  Vascular Pharmacy Liaison (Rx Coordinator)  P: [phone]  11/7/2024 4:01 PM

## 2024-11-08 ENCOUNTER — TELEPHONE (OUTPATIENT)
Dept: CARDIOLOGY | Facility: MEDICAL CENTER | Age: 69
End: 2024-11-08
Payer: MEDICARE

## 2024-11-08 NOTE — TELEPHONE ENCOUNTER
Prior Authorization for NEXLETOL 180MG TABLETS  (Quantity: 30, Days: 30) has been submitted via Cover My Meds: Key ([id number])    Insurance: JOSE LUIS OLIVA     Will follow up in 24-48 business hours.     ABEL Villatoro, PhT  Vascular Pharmacy Liaison (Rx Coordinator)  P: [phone]  11/7/2024 4:24 PM

## 2024-11-08 NOTE — TELEPHONE ENCOUNTER
PA for NEXLETOL 180MG TABLETS  has been approved for a quantity of 90 , day supply 90    PA reference number: [id number]  Insurance: JOSE LUIS OLIVA   Effective dates: 11/07/2024-11/06/2025  Copay: $30 ; $90/90DS     Is patient eligible to fill with [facility]? Yes    Next Steps: The patient's copay exceeds $5.00. Proceed with contacting patient to offer financial assistance.    ABEL Villatoro, PhT  Vascular Pharmacy Liaison (Rx Coordinator)  P: [phone]  11/8/2024 6:24 AM

## 2024-11-08 NOTE — TELEPHONE ENCOUNTER
Received New Start  request via [employer]  for Bempedoic 180 mg tablet . (Quantity:30, Day Supply:30)     Insurance: [employer]  Member ID:  [id number]  BIN: [id number]  PCN: [id number]  Group: [id number]     Ran Test claim via [place] [facility] medication  pt has a $30 copay for 30 days. I called pt and let him know of his copay. He would like the RX sent to [facility]. He said he will  later this afternoon.

## 2024-11-14 NOTE — TELEPHONE ENCOUNTER
Per Denise's encounter on 11/8, she did spoke with the pt and requested to have the medication released to pt's preferred pharmacy.     Called [facility] pharmacy @ [phone] and s/w Iván Silva to obtain confirmation for medication .     Per Shira, pt already picked up his Nexletol on 11/8 at 2:37 PM for $30.     Updated provider for the status.     ABEL Villatoro, PhT  Vascular Pharmacy Liaison (Rx Coordinator)  P: [phone]  11/14/2024 3:20 PM

## 2024-12-04 ENCOUNTER — APPOINTMENT (OUTPATIENT)
Dept: URBAN - METROPOLITAN AREA CLINIC 35 | Facility: CLINIC | Age: 69
Setting detail: DERMATOLOGY
End: 2024-12-04

## 2024-12-04 DIAGNOSIS — Z41.9 ENCOUNTER FOR PROCEDURE FOR PURPOSES OTHER THAN REMEDYING HEALTH STATE, UNSPECIFIED: ICD-10-CM

## 2024-12-04 PROCEDURE — ? FRAXEL RESTORE DUAL

## 2024-12-04 ASSESSMENT — LOCATION ZONE DERM: LOCATION ZONE: FACE

## 2024-12-04 ASSESSMENT — LOCATION DETAILED DESCRIPTION DERM: LOCATION DETAILED: LEFT INFERIOR MEDIAL FOREHEAD

## 2024-12-04 ASSESSMENT — LOCATION SIMPLE DESCRIPTION DERM: LOCATION SIMPLE: LEFT FOREHEAD

## 2024-12-04 NOTE — HPI: COSMETIC (LASER RESURFACING)
Have You Had Laser Resurfacing Before?: has had previous treatments
When Was Your Last Laser Resurfacing Treatment?: 09/11/2024

## 2024-12-04 NOTE — PROCEDURE: FRAXEL RESTORE DUAL
Topical Anesthesia?: 23% lidocaine, 7% tetracaine
Length Topical Anesthesia Applied (Optional): 50 minutes
Post-Care Instructions: I reviewed with the patient in detail post-care instructions.
Passes (Optional): 8
Actual Kj Used (Optional): 2.97
Treatment Number (Optional): 34
Consent: Written consent obtained, risks reviewed including but not limited to crusting, scabbing, blistering, scarring, darker or lighter pigmentary change, and/or incomplete removal.
Detail Level: Zone
Energy In Mj (Optional): 20
Price (Use Numbers Only, No Special Characters Or $): 6826

## 2024-12-17 DIAGNOSIS — L40.9 PSORIASIS: ICD-10-CM

## 2024-12-17 RX ORDER — CALCIPOTRIENE 50 UG/G
CREAM TOPICAL
Qty: 120 G | Refills: 6 | Status: SHIPPED | OUTPATIENT
Start: 2024-12-17

## 2024-12-23 ENCOUNTER — HOSPITAL ENCOUNTER (OUTPATIENT)
Facility: MEDICAL CENTER | Age: 69
End: 2024-12-23
Attending: UROLOGY
Payer: MEDICARE

## 2024-12-23 ENCOUNTER — NON-PROVIDER VISIT (OUTPATIENT)
Dept: INTERNAL MEDICINE | Facility: IMAGING CENTER | Age: 69
End: 2024-12-23
Payer: MEDICARE

## 2024-12-23 LAB
ANION GAP SERPL CALC-SCNC: 9 MMOL/L (ref 7–16)
APPEARANCE UR: CLEAR
BASOPHILS # BLD AUTO: 1.3 % (ref 0–1.8)
BASOPHILS # BLD: 0.07 K/UL (ref 0–0.12)
BILIRUB UR QL STRIP.AUTO: NEGATIVE
BUN SERPL-MCNC: 25 MG/DL (ref 8–22)
CALCIUM SERPL-MCNC: 9 MG/DL (ref 8.5–10.5)
CHLORIDE SERPL-SCNC: 104 MMOL/L (ref 96–112)
CO2 SERPL-SCNC: 24 MMOL/L (ref 20–33)
COLOR UR: YELLOW
CREAT SERPL-MCNC: 0.95 MG/DL (ref 0.5–1.4)
EOSINOPHIL # BLD AUTO: 0.29 K/UL (ref 0–0.51)
EOSINOPHIL NFR BLD: 5.2 % (ref 0–6.9)
ERYTHROCYTE [DISTWIDTH] IN BLOOD BY AUTOMATED COUNT: 41.7 FL (ref 35.9–50)
GFR SERPLBLD CREATININE-BSD FMLA CKD-EPI: 87 ML/MIN/1.73 M 2
GLUCOSE SERPL-MCNC: 88 MG/DL (ref 65–99)
GLUCOSE UR STRIP.AUTO-MCNC: NEGATIVE MG/DL
HCT VFR BLD AUTO: 42.7 % (ref 42–52)
HGB BLD-MCNC: 14.7 G/DL (ref 14–18)
IMM GRANULOCYTES # BLD AUTO: 0.01 K/UL (ref 0–0.11)
IMM GRANULOCYTES NFR BLD AUTO: 0.2 % (ref 0–0.9)
KETONES UR STRIP.AUTO-MCNC: NEGATIVE MG/DL
LEUKOCYTE ESTERASE UR QL STRIP.AUTO: NEGATIVE
LYMPHOCYTES # BLD AUTO: 2 K/UL (ref 1–4.8)
LYMPHOCYTES NFR BLD: 36 % (ref 22–41)
MCH RBC QN AUTO: 30.5 PG (ref 27–33)
MCHC RBC AUTO-ENTMCNC: 34.4 G/DL (ref 32.3–36.5)
MCV RBC AUTO: 88.6 FL (ref 81.4–97.8)
MICRO URNS: NORMAL
MONOCYTES # BLD AUTO: 0.75 K/UL (ref 0–0.85)
MONOCYTES NFR BLD AUTO: 13.5 % (ref 0–13.4)
NEUTROPHILS # BLD AUTO: 2.43 K/UL (ref 1.82–7.42)
NEUTROPHILS NFR BLD: 43.8 % (ref 44–72)
NITRITE UR QL STRIP.AUTO: NEGATIVE
NRBC # BLD AUTO: 0 K/UL
NRBC BLD-RTO: 0 /100 WBC (ref 0–0.2)
PH UR STRIP.AUTO: 6 [PH] (ref 5–8)
PLATELET # BLD AUTO: 226 K/UL (ref 164–446)
PMV BLD AUTO: 12.2 FL (ref 9–12.9)
POTASSIUM SERPL-SCNC: 4.5 MMOL/L (ref 3.6–5.5)
PROT UR QL STRIP: NEGATIVE MG/DL
RBC # BLD AUTO: 4.82 M/UL (ref 4.7–6.1)
RBC UR QL AUTO: NEGATIVE
SODIUM SERPL-SCNC: 137 MMOL/L (ref 135–145)
SP GR UR STRIP.AUTO: 1.01
UROBILINOGEN UR STRIP.AUTO-MCNC: 0.2 EU/DL
WBC # BLD AUTO: 5.6 K/UL (ref 4.8–10.8)

## 2024-12-23 PROCEDURE — 80048 BASIC METABOLIC PNL TOTAL CA: CPT

## 2024-12-23 PROCEDURE — 87086 URINE CULTURE/COLONY COUNT: CPT

## 2024-12-23 PROCEDURE — 99999 PR NO CHARGE: CPT

## 2024-12-23 PROCEDURE — 81003 URINALYSIS AUTO W/O SCOPE: CPT

## 2024-12-23 PROCEDURE — 85025 COMPLETE CBC W/AUTO DIFF WBC: CPT

## 2024-12-23 NOTE — PROGRESS NOTES
Adal Dobbins is a 69 y.o. male here for a non-provider visit for a lab draw on 12/23/2024 at 3:29 PM.    Procedure performed:  Venipuncture     Anatomical site:  Left Antecubital Area    Equipment used:  21 g vacutainer     Labs drawn:  Basic Metabolic Panel , CBC with differential , and urinalysis, urine culture.    Ordering provider:  Robni MONTANEZ    Lab draw completed by:  Andreina Le R.N.

## 2024-12-25 LAB
BACTERIA UR CULT: NORMAL
SIGNIFICANT IND 70042: NORMAL
SITE SITE: NORMAL
SOURCE SOURCE: NORMAL

## 2025-01-07 ENCOUNTER — HOSPITAL ENCOUNTER (OUTPATIENT)
Facility: MEDICAL CENTER | Age: 70
End: 2025-01-07
Attending: INTERNAL MEDICINE
Payer: MEDICARE

## 2025-01-07 ENCOUNTER — NON-PROVIDER VISIT (OUTPATIENT)
Dept: INTERNAL MEDICINE | Facility: IMAGING CENTER | Age: 70
End: 2025-01-07
Payer: MEDICARE

## 2025-01-07 DIAGNOSIS — E78.5 DYSLIPIDEMIA: ICD-10-CM

## 2025-01-07 DIAGNOSIS — Z01.89 ENCOUNTER FOR ROUTINE LABORATORY TESTING: ICD-10-CM

## 2025-01-07 LAB
ALBUMIN SERPL BCP-MCNC: 4.6 G/DL (ref 3.2–4.9)
ALBUMIN/GLOB SERPL: 1.6 G/DL
ALP SERPL-CCNC: 41 U/L (ref 30–99)
ALT SERPL-CCNC: 26 U/L (ref 2–50)
ANION GAP SERPL CALC-SCNC: 11 MMOL/L (ref 7–16)
AST SERPL-CCNC: 28 U/L (ref 12–45)
BILIRUB SERPL-MCNC: 0.4 MG/DL (ref 0.1–1.5)
BUN SERPL-MCNC: 16 MG/DL (ref 8–22)
CALCIUM ALBUM COR SERPL-MCNC: 8.8 MG/DL (ref 8.5–10.5)
CALCIUM SERPL-MCNC: 9.3 MG/DL (ref 8.5–10.5)
CHLORIDE SERPL-SCNC: 108 MMOL/L (ref 96–112)
CHOLEST SERPL-MCNC: 150 MG/DL (ref 100–199)
CO2 SERPL-SCNC: 25 MMOL/L (ref 20–33)
CREAT SERPL-MCNC: 0.83 MG/DL (ref 0.5–1.4)
GFR SERPLBLD CREATININE-BSD FMLA CKD-EPI: 95 ML/MIN/1.73 M 2
GGT SERPL-CCNC: 12 U/L (ref 7–51)
GLOBULIN SER CALC-MCNC: 2.9 G/DL (ref 1.9–3.5)
GLUCOSE SERPL-MCNC: 85 MG/DL (ref 65–99)
HDLC SERPL-MCNC: 76 MG/DL
LDLC SERPL CALC-MCNC: 63 MG/DL
POTASSIUM SERPL-SCNC: 4.4 MMOL/L (ref 3.6–5.5)
PROT SERPL-MCNC: 7.5 G/DL (ref 6–8.2)
SODIUM SERPL-SCNC: 144 MMOL/L (ref 135–145)
TRIGL SERPL-MCNC: 55 MG/DL (ref 0–149)

## 2025-01-07 PROCEDURE — 80053 COMPREHEN METABOLIC PANEL: CPT

## 2025-01-07 PROCEDURE — 83704 LIPOPROTEIN BLD QUAN PART: CPT

## 2025-01-07 PROCEDURE — 80061 LIPID PANEL: CPT

## 2025-01-07 PROCEDURE — 82172 ASSAY OF APOLIPOPROTEIN: CPT

## 2025-01-07 PROCEDURE — 99999 PR NO CHARGE: CPT

## 2025-01-07 PROCEDURE — 82977 ASSAY OF GGT: CPT

## 2025-01-09 LAB — APO B100 SERPL-MCNC: 68 MG/DL (ref 66–133)

## 2025-01-10 LAB
CHOLEST SERPL-MCNC: 149 MG/DL
HDL PARTICAL NO Q4363: 38.9 UMOL/L
HDL SIZE Q4361: 9.7 NM
HDLC SERPL-MCNC: 70 MG/DL (ref 40–59)
HLD.LARGE SERPL-SCNC: 11.4 UMOL/L
L VLDL PART NO Q4357: <1.5 NMOL/L
LDL SERPL QN: 20.8 NM
LDL SERPL-SCNC: 852 NMOL/L
LDL SMALL SERPL-SCNC: 260 NMOL/L
LDLC SERPL CALC-MCNC: 67 MG/DL
PATHOLOGY STUDY: ABNORMAL
TRIGL SERPL-MCNC: 58 MG/DL (ref 30–149)
VLDL SIZE Q4362: 45.6 NM

## 2025-01-23 ENCOUNTER — HOSPITAL ENCOUNTER (OUTPATIENT)
Facility: MEDICAL CENTER | Age: 70
End: 2025-01-23
Attending: PHYSICIAN ASSISTANT
Payer: MEDICARE

## 2025-01-23 PROCEDURE — 87086 URINE CULTURE/COLONY COUNT: CPT

## 2025-01-25 LAB
BACTERIA UR CULT: NORMAL
SIGNIFICANT IND 70042: NORMAL
SITE SITE: NORMAL
SOURCE SOURCE: NORMAL

## 2025-01-27 ENCOUNTER — TELEPHONE (OUTPATIENT)
Dept: VASCULAR LAB | Facility: MEDICAL CENTER | Age: 70
End: 2025-01-27
Payer: MEDICARE

## 2025-01-27 NOTE — TELEPHONE ENCOUNTER
Established patient  Chart prep for upcoming appointment.    Any pending/incomplete orders from last visit? No, all orders completed.  Was patient called and reminded to complete pending orders? N/A orders complete  Were any records requested?  No    Referral up to date? Yes  Referral attached to appointment (renewals and New patients only)? N/A (established with up-to-date referral)  Virtual appointment? No    Meme Farfan, Med Ass't  [facility]  Ph. [phone]  Fx. [phone]

## 2025-01-31 DIAGNOSIS — N52.9 ERECTILE DYSFUNCTION, UNSPECIFIED ERECTILE DYSFUNCTION TYPE: ICD-10-CM

## 2025-01-31 RX ORDER — TADALAFIL 20 MG/1
20 TABLET ORAL
Qty: 20 TABLET | Refills: 3 | Status: SHIPPED | OUTPATIENT
Start: 2025-01-31

## 2025-02-04 ENCOUNTER — NON-PROVIDER VISIT (OUTPATIENT)
Dept: INTERNAL MEDICINE | Facility: IMAGING CENTER | Age: 70
End: 2025-02-04
Payer: MEDICARE

## 2025-02-04 ENCOUNTER — APPOINTMENT (OUTPATIENT)
Dept: CARDIOLOGY | Facility: MEDICAL CENTER | Age: 70
End: 2025-02-04
Attending: INTERNAL MEDICINE
Payer: MEDICARE

## 2025-02-04 ENCOUNTER — HOSPITAL ENCOUNTER (OUTPATIENT)
Facility: MEDICAL CENTER | Age: 70
End: 2025-02-04
Attending: UROLOGY
Payer: MEDICARE

## 2025-02-04 ENCOUNTER — OFFICE VISIT (OUTPATIENT)
Dept: VASCULAR LAB | Facility: MEDICAL CENTER | Age: 70
End: 2025-02-04
Attending: INTERNAL MEDICINE
Payer: MEDICARE

## 2025-02-04 VITALS
HEART RATE: 58 BPM | WEIGHT: 170 LBS | HEIGHT: 68 IN | DIASTOLIC BLOOD PRESSURE: 67 MMHG | BODY MASS INDEX: 25.76 KG/M2 | SYSTOLIC BLOOD PRESSURE: 110 MMHG

## 2025-02-04 DIAGNOSIS — E78.2 MIXED HYPERLIPIDEMIA WITH APOLIPOPROTEIN E3 VARIANT: ICD-10-CM

## 2025-02-04 DIAGNOSIS — E03.9 HYPOTHYROIDISM, UNSPECIFIED TYPE: ICD-10-CM

## 2025-02-04 DIAGNOSIS — E78.41 ELEVATED LP(A): ICD-10-CM

## 2025-02-04 DIAGNOSIS — R93.1 AGATSTON CAC SCORE, >400: ICD-10-CM

## 2025-02-04 DIAGNOSIS — Z01.89 ENCOUNTER FOR ROUTINE LABORATORY TESTING: ICD-10-CM

## 2025-02-04 DIAGNOSIS — I10 HYPERTENSION, UNSPECIFIED TYPE: ICD-10-CM

## 2025-02-04 PROCEDURE — 99999 PR NO CHARGE: CPT

## 2025-02-04 PROCEDURE — 84153 ASSAY OF PSA TOTAL: CPT

## 2025-02-04 PROCEDURE — G2211 COMPLEX E/M VISIT ADD ON: HCPCS | Performed by: INTERNAL MEDICINE

## 2025-02-04 PROCEDURE — 99212 OFFICE O/P EST SF 10 MIN: CPT

## 2025-02-04 PROCEDURE — 3074F SYST BP LT 130 MM HG: CPT | Performed by: INTERNAL MEDICINE

## 2025-02-04 PROCEDURE — 99213 OFFICE O/P EST LOW 20 MIN: CPT | Performed by: INTERNAL MEDICINE

## 2025-02-04 PROCEDURE — 3078F DIAST BP <80 MM HG: CPT | Performed by: INTERNAL MEDICINE

## 2025-02-04 ASSESSMENT — FIBROSIS 4 INDEX: FIB4 SCORE: 1.68

## 2025-02-04 NOTE — PROGRESS NOTES
"[facility] - follow up  Visit  Date of Service: 02/04/25    Patient here for f/u of dyslipidemia    Subjective    HPI  History of ASCVD: No  Other Established (non-atherosclerotic) Vascular Disease, if Present: None  Age at Initial Diagnosis of Dyslipidemia: 50s    Current Prescription Lipid Lowering Medications - including dose:   Statin: Rosuvastatin 20 mg  Non-Statin: Bempedoic acid 100 mg a day  Current Lipid Lowering and Related Supplements:   Aspirin 81 mg a day  Vitamin D  Coq.10  Any Current Side Effects Potentially Related to Lipid Lowering therapy?   No  Current Adherence to Lipid Lowering Therapies   Complete  Previously Attempted Interventions for Lipids - including outcome  Statin: None    Outcome: N/A  Non-Statin: Ezetimibe   Outcome: modestly elevated LFTs  Any Previous History of Statin Intolerance?   No  Baseline Lipids Prior to Treatment:   Unknown or unavailable - only on treatment blood work available  Other Pertinent History:   Excellent exercise tolerance  No angina  Remains on same dose levothyroxine -good energy level  History of other CV risk factors:   He is down to 10 mg of benazepril a day  Home readings in the 110s to 120s - no lightheadedness  History of cigarette and cigar smoking - none recently  No fam history of premature ASCVD    FAMILY HISTORY: High cholesterol and high bp but no ascvd in first degree relatives on mothers side. Does not know fathers histoyr    SOCIAL HISTORY   Social History     Tobacco Use   Smoking Status Light Smoker    Types: Cigars   Smokeless Tobacco Never   Tobacco Comments    1 cigar per week     Change in weight: Has maintained his weight loss  Exercise habits: strenuous regular exercise program  Diet: med style - very consistent        Objective      Vitals:    02/04/25 0950   BP: 110/67   BP Location: Left arm   Patient Position: Sitting   BP Cuff Size: Adult   Pulse: (!) 58   Weight: 77.1 kg (170 lb)   Height: 1.727 m (5' 8\")      Physical " Exam  Vitals reviewed.   Constitutional:       General: He is not in acute distress.     Appearance: He is well-developed. He is not toxic-appearing or diaphoretic.   HENT:      Head: Normocephalic and atraumatic.   Eyes:      General: No scleral icterus.     Extraocular Movements: Extraocular movements intact.      Conjunctiva/sclera: Conjunctivae normal.   Neck:      Thyroid: No thyromegaly.      Vascular: No carotid bruit or JVD.   Cardiovascular:      Rate and Rhythm: Normal rate and regular rhythm.      Heart sounds: Murmur heard.      No friction rub. No gallop.   Pulmonary:      Effort: Pulmonary effort is normal. No respiratory distress.      Breath sounds: Normal breath sounds. No wheezing or rales.   Musculoskeletal:      Right lower leg: No edema.      Left lower leg: No edema.   Skin:     Coloration: Skin is not pale.   Neurological:      General: No focal deficit present.      Mental Status: He is alert and oriented to person, place, and time.      Cranial Nerves: No cranial nerve deficit.      Coordination: Coordination normal.      Gait: Gait normal.   Psychiatric:         Mood and Affect: Mood normal.         Behavior: Behavior normal.         DATA REVIEW:  Most Recent Lipid Panel:   Lab Results   Component Value Date    CHOLSTRLTOT 149 01/07/2025    CHOLSTRLTOT 150 01/07/2025    TRIGLYCERIDE 58 01/07/2025    TRIGLYCERIDE 55 01/07/2025    HDL 70 (H) 01/07/2025    HDL 76 01/07/2025    LDL 63 01/07/2025   LDL C -67  LDLP - 852    Lab Results   Component Value Date/Time    LIPOPROTA 23 07/29/2024 08:05 AM      Lab Results   Component Value Date/Time    APOB 68 01/07/2025 08:15 AM      Lab Results   Component Value Date/Time    CRPHIGHSEN 0.2 04/25/2024 08:00 AM      Other Pertinent Blood Work:   Lab Results   Component Value Date    SODIUM 141 01/10/2025    SODIUM 144 01/07/2025    POTASSIUM 4.2 01/10/2025    POTASSIUM 4.4 01/07/2025    CHLORIDE 111 (H) 01/10/2025    CHLORIDE 108 01/07/2025    CO2 26.0  01/10/2025    CO2 25 01/07/2025    ANION 4.0 01/10/2025    ANION 11.0 01/07/2025    GLUCOSE 121 01/10/2025    GLUCOSE 85 01/07/2025    BUN 9.0 01/10/2025    BUN 16 01/07/2025    CREATININE 1.00 01/10/2025    CREATININE 0.83 01/07/2025    CALCIUM 9.1 01/10/2025    CALCIUM 9.3 01/07/2025    ASTSGOT 28 01/07/2025    ALTSGPT 26 01/07/2025    ALKPHOSPHAT 41 01/07/2025    TBILIRUBIN 0.4 01/07/2025    ALBUMIN 4.6 01/07/2025    AGRATIO 1.6 01/07/2025    CRPHIGHSEN 0.2 04/25/2024    CREACTPROT <0.30 10/04/2024    LIPOPROTA 23 07/29/2024    TSHULTRASEN 2.430 10/04/2024    CPKTOTAL 223 (H) 03/29/2023       CAC 2014  LMA - 0.0   LCX - 0.0   LAD - 102.8   RCA - 0.0   PDA - 30.5  Calcium Score:  133.3     vasc screen and cimt 2017  Mild carotid plaque  No aaa  Normal bee  Mean cimt 0.719  [facility] age 62    Stress Echo 2018  Negative stress echocardiogram for ischemia with adequate stress achieved by   heart rate criteria.     vasc screen and cimt 2018  Mild carotid plaque  No aaa  Normal bee  Mean cimt 0.719  [facility] age 63    CAC score 2022  LMA - 0.0  LCX - 0.0  LAD - 469.2  RCA - 117.9  PDA - 587.1  Total Calcium Score: 587.1    vasc screen and cimt April 2022  Mild carotid plaque  No aaa  Normal bee  Mean cimt 0.758  [facility] age 67    MPI april 2022   No evidence of significant jeopardized viable myocardium or prior myocardial    infarction.   Normal left ventricular size, ejection fraction, and wall motion.    CIMT and vasc screen april 2023   Moderate plaque of the carotid bifurcation with velocities consistent with less than 50% stenosis of the internal carotid.    No aneurysm of the abdominal aorta.    ABIs are increased bilaterally consistent with vessel hardening, limiting evaluation.  Composite mean C IMT 0.736  Vascular age 64    CMT and vascular screen April 2024  Mild carotid plaque bilaterally without significant stenosis  Normal BEE  No AAA  Mean CIMT 0.832  Vascular age 79        ASSESSMENT AND PLAN  Patient Type, check  all that apply:   Primary Prevention  Established Atherosclerotic Cardiovascular Disease (ASCVD)  Subclinical ASCVD as evidenced by his elevated coronary calcium score, elevated C IMT, and mild carotid atheroma seen on vascular screen.  As we previously discussed at length, coronary calcium scores are not meant to be followed serially and his worsening in coronary calcium score between 2014 and 2022 is as likely to be the result of calcification and stabilization of pre-existing plaque rather than progression of atherosclerosis.  CMT and vascular screen are a much better way to follow progression   Overall, I think picture is most consistent with the development of early atherosclerosis in his 40s and 50s with stabilization of existing plaque with better lifestyle and lipid-lowering therapy over the last 5 to 10 years  There is no evidence of ischemia on MPI and by symptoms so I do not think there is any reason to proceed with coronary CTA unless he develops symptoms.  This is an indication for aggressive medical management and we can follow his progression with C IMT and vascular screen serially.  I would not recommend he gets another coronary artery calcium score  His CIMT appears worse than previous in 2023 -unclear if this is artifact  Other Established (non-atherosclerotic) Vascular Disease, if Present:  None  Evidence of Heterozygous Familial Hypercholesterolemia (FH):   No   ACC/AHA Indication for Statin Therapy, jose all that apply:  Baseline Calculated ASCVD risk >7.5%: Indication for Moderate intensity statin  Calculated Risk for ASCVD, if applicable    9.7 % - likely an underestimation given subclinical ascvd  Other Significant Risk Markers, if any, joes all that apply   Subclinical atherosclerosis on CAC scoring  Subclinical atherosclerosis on vascular screen and C IMT -has been stable but worse on most recent imaging in 2023  Mildly elevated lipoprotein a  Favorable triglycerides, HDL, and LDL particle  size  Favorable CRP   Treatment goals based on shared decision making  Using shared decision making we decided to treat to aggressively and target an LDL C less than 70, APO B less than 70, and LDL particle number at least less than 900 -   decent control with addition of bempedoic acid  Lifestyle Recommendations From Today’s Visit:    Continue regular exercise  Continue maintain weight  Continue very heart healthy diet low in saturated fat  Statin Therapy Recommendations from Today’s Visit:   Continue rosuvastatin to 20 mg daily - can consider dose increase to 40 mg if LFTs remains stable  Non-Statin Medications Recommendations from Today’s Visit:   Off ezetimibe due to modest increase in LFTs which resolved with d/c. Could consider another trial in future  -Continue nexletol 180 mg daily   Indication for PCSK9 Inhibitor, if applicable:  Not currently indicated -but could consider if his atherosclerosis worsens   Supplements Recommended at this visit:   Continue vitamin D and coq.10 to help with statin tolerability  Recommendations for Other Cardiovascular Risk Factors, jose all that apply:   -Hypertension -under excellent control both in the office and at home despite decreasing doses of benazepril.  Continue benazepril 10 mg daily.  Continue home blood pressure monitoring  -History of smoking -recommend continued avoidance of all tobacco products  -Antiplatelet therapy -reasonable to continue low-dose aspirin given his subclinical CAD and low bleeding risk  Other Issues:  - hypothyroidism -asymptomatic.  Seems under reasonable control per symptoms and TSH.  Continue current dose levothyroxine. Recheck TSH AND T4 with next blood work.  Otherwise defer further management to PCP    Future Studies: Repeat cimt and vascular screen prior to next visit  Blood Work Ordered At Today’s visit: NMR, apoB, cmp, crp  Follow-Up: 6  months to ensure LFTs don't increase on nexletol    Michael J Bloch, M.D.    CC:  DARIA Steen,  M.D.

## 2025-02-05 ENCOUNTER — APPOINTMENT (OUTPATIENT)
Dept: URBAN - METROPOLITAN AREA CLINIC 35 | Facility: CLINIC | Age: 70
Setting detail: DERMATOLOGY
End: 2025-02-05

## 2025-02-05 DIAGNOSIS — Z41.9 ENCOUNTER FOR PROCEDURE FOR PURPOSES OTHER THAN REMEDYING HEALTH STATE, UNSPECIFIED: ICD-10-CM

## 2025-02-05 PROCEDURE — ? FRAXEL RESTORE DUAL

## 2025-02-05 ASSESSMENT — LOCATION SIMPLE DESCRIPTION DERM: LOCATION SIMPLE: LEFT FOREHEAD

## 2025-02-05 ASSESSMENT — LOCATION ZONE DERM: LOCATION ZONE: FACE

## 2025-02-05 ASSESSMENT — LOCATION DETAILED DESCRIPTION DERM: LOCATION DETAILED: LEFT INFERIOR MEDIAL FOREHEAD

## 2025-02-05 NOTE — PROCEDURE: FRAXEL RESTORE DUAL
Topical Anesthesia?: 23% lidocaine, 7% tetracaine
Length Topical Anesthesia Applied (Optional): 50 minutes
Post-Care Instructions: I reviewed with the patient in detail post-care instructions.
Passes (Optional): 8
Treatment Number (Optional): 35
Consent: Written consent obtained, risks reviewed including but not limited to crusting, scabbing, blistering, scarring, darker or lighter pigmentary change, and/or incomplete removal.
Detail Level: Zone
Energy In Mj (Optional): 20
Price (Use Numbers Only, No Special Characters Or $): 7642

## 2025-02-05 NOTE — HPI: COSMETIC (LASER RESURFACING)
Have You Had Laser Resurfacing Before?: has had previous treatments
When Was Your Last Laser Resurfacing Treatment?: 12/04/2024

## 2025-02-06 LAB — PSA SERPL DL<=0.01 NG/ML-MCNC: 0.04 NG/ML (ref 0–4)

## 2025-02-10 ENCOUNTER — TELEPHONE (OUTPATIENT)
Dept: VASCULAR LAB | Facility: MEDICAL CENTER | Age: 70
End: 2025-02-10
Payer: MEDICARE

## 2025-02-10 NOTE — TELEPHONE ENCOUNTER
Ran test claim on medication and it came up as Refill Too Soon. Double checked the pharmacy that the medication was filled at and confirmed pt filled at a [facility] pharmacy on [address].    Spoke to a pharmacy tech named Shira and she confirmed the pt picked up a 30 day supply 1/29/25 for $35. No PA required.     Routing to provider for his info! Thanks.      No Ralph  Pharmacy Liaison  [facility]  [phone]  2/10/2025 3:21 PM

## 2025-02-12 ENCOUNTER — HOSPITAL ENCOUNTER (OUTPATIENT)
Facility: MEDICAL CENTER | Age: 70
End: 2025-02-12
Attending: UROLOGY
Payer: MEDICARE

## 2025-02-12 PROCEDURE — 87086 URINE CULTURE/COLONY COUNT: CPT

## 2025-02-15 LAB
BACTERIA UR CULT: NORMAL
SIGNIFICANT IND 70042: NORMAL
SITE SITE: NORMAL
SOURCE SOURCE: NORMAL

## 2025-02-19 ENCOUNTER — RESULTS FOLLOW-UP (OUTPATIENT)
Dept: VASCULAR LAB | Facility: MEDICAL CENTER | Age: 70
End: 2025-02-19

## 2025-02-19 ENCOUNTER — HOSPITAL ENCOUNTER (OUTPATIENT)
Dept: RADIOLOGY | Facility: MEDICAL CENTER | Age: 70
End: 2025-02-19
Attending: INTERNAL MEDICINE
Payer: COMMERCIAL

## 2025-02-19 DIAGNOSIS — E78.2 MIXED HYPERLIPIDEMIA WITH APOLIPOPROTEIN E3 VARIANT: ICD-10-CM

## 2025-02-19 PROCEDURE — 306723 US-TOTAL VASCULAR SCREENING (S/P)

## 2025-02-19 PROCEDURE — 93998 UNLISTD NONINVAS VASC DX STD: CPT

## 2025-02-21 ENCOUNTER — PATIENT MESSAGE (OUTPATIENT)
Dept: INTERNAL MEDICINE | Facility: IMAGING CENTER | Age: 70
End: 2025-02-21
Payer: MEDICARE

## 2025-02-21 DIAGNOSIS — N52.9 ERECTILE DYSFUNCTION, UNSPECIFIED ERECTILE DYSFUNCTION TYPE: ICD-10-CM

## 2025-02-21 RX ORDER — TADALAFIL 20 MG/1
20 TABLET ORAL
Qty: 20 TABLET | Refills: 3 | Status: SHIPPED | OUTPATIENT
Start: 2025-02-21

## 2025-04-02 ENCOUNTER — TELEPHONE (OUTPATIENT)
Dept: SLEEP MEDICINE | Facility: MEDICAL CENTER | Age: 70
End: 2025-04-02
Payer: MEDICARE

## 2025-04-02 NOTE — TELEPHONE ENCOUNTER
Called patient LVM @ 1:20 pm on 04/02/2025 advised patient to bring in SD card or machine for a compliance download.

## 2025-04-03 ENCOUNTER — OFFICE VISIT (OUTPATIENT)
Dept: SLEEP MEDICINE | Facility: MEDICAL CENTER | Age: 70
End: 2025-04-03
Attending: NURSE PRACTITIONER
Payer: MEDICARE

## 2025-04-03 VITALS
OXYGEN SATURATION: 98 % | SYSTOLIC BLOOD PRESSURE: 114 MMHG | HEART RATE: 67 BPM | HEIGHT: 68 IN | WEIGHT: 165 LBS | RESPIRATION RATE: 16 BRPM | DIASTOLIC BLOOD PRESSURE: 62 MMHG | BODY MASS INDEX: 25.01 KG/M2

## 2025-04-03 DIAGNOSIS — G47.33 OSA (OBSTRUCTIVE SLEEP APNEA): ICD-10-CM

## 2025-04-03 PROCEDURE — 99213 OFFICE O/P EST LOW 20 MIN: CPT | Performed by: NURSE PRACTITIONER

## 2025-04-03 PROCEDURE — 99214 OFFICE O/P EST MOD 30 MIN: CPT | Performed by: NURSE PRACTITIONER

## 2025-04-03 PROCEDURE — 3078F DIAST BP <80 MM HG: CPT | Performed by: NURSE PRACTITIONER

## 2025-04-03 PROCEDURE — 3074F SYST BP LT 130 MM HG: CPT | Performed by: NURSE PRACTITIONER

## 2025-04-03 ASSESSMENT — FIBROSIS 4 INDEX: FIB4 SCORE: 1.71

## 2025-04-03 ASSESSMENT — PATIENT HEALTH QUESTIONNAIRE - PHQ9: CLINICAL INTERPRETATION OF PHQ2 SCORE: 0

## 2025-04-03 NOTE — PROGRESS NOTES
Chief Complaint   Patient presents with    Follow-Up     SLEEP - ESTABLISHED PT CHART PREP COMPLETED ON 03/27/2025     Last Office Visit 04/12/2024 with Gary Valderrama    DME: DME:  [facility] / say [phone] / fx [phone]      PAP/O2/OAT: Machine brand:ResMed  Machine Type: Auto CPAP  Auto CPAP cmH20 4-7        Wireless Data? NO        HPI:  Adal Dobbins is a 70 y.o. year old male here today for follow-up on GONZALEZ f/u. PMH includes asthma, psoriasis, history of stroke from vertebral artery dissection, mixed hyperlipidemia with apolipoprotein A3 variant, RBBB, Agaston CAC score >400, BPH, AAA, hypertension, arthrosclerosis of bilateral carotid arteries, hypothyroid, GONZALEZ, ED, light cigar smoker.     Patient was set up with a ResMed auto CPAP machine on 9/19/2022.  Using a ResMed AirFit N30 I.  Denies any difficulty with mask fit or pressures.  He does have occasional irritation from using the mask for prolonged period of time.  He does state that he does not clean it frequently.  Cleaning precautions and recommendations were discussed.  Averages 8 to 9 hours of sleep and denies any difficulty falling or staying asleep.  Overall notes improvement in sleep quality and denies any excessive daytime sleepiness, morning headaches, palpitations, concentration or memory problems.        30-day compliance shows 100% use with an average time of 9 hours and 16 minutes and a residual AHI of 1.2 with no evidence of persistent mask leak       Sleep/Card Study History:   PSG study from 6/1/22 indicated Mild OAS with AHI of 9.2/hr and O2 yoel 82 %. AHI during rem was 22.5 and AHI while supine was 10.56. The patient spent 12.8 minutes of TST with SaO2 <88%.     NM Cardiac Stress test from 4/15/22 indicated No evidence of significant jeopardized viable myocardium or prior myocardial infarction. Normal left ventricular size, ejection fraction, and wall motion. LVEF 65%.  ST Depression during lexiscan administration which could  "be compatible with ischemia, but non-specific given RBBB. ECG INTERPRETATION abnormal ECG response to lexiscan..     ROS: As per HPI and otherwise negative if not stated.    Past Medical History:   Diagnosis Date    ASTHMA     mild intermittent    Chickenpox     Family history of hypertension     History of stroke     HTN (hypertension) 2011    Hyperlipidemia     Hypothyroid 07/01/2016    Obstructive sleep apnea 6/13/2022    Psoriasis     Stroke (HCC) 1997    Vertebral artery disection       Past Surgical History:   Procedure Laterality Date    OTHER  age 12     nasal polyps       Family History   Problem Relation Age of Onset    Hypertension Mother     Other Father 45        Viral encephalitis    Sleep Apnea Neg Hx        Allergies as of 04/03/2025    (No Known Allergies)        Vitals:  /62 (BP Location: Left arm, Patient Position: Sitting, BP Cuff Size: Adult)   Pulse 67   Resp 16   Ht 1.727 m (5' 8\")   Wt 74.8 kg (165 lb)   SpO2 98%     Current medications as of today   Current Outpatient Medications   Medication Sig Dispense Refill    tadalafil 20 MG tablet Take 1 Tablet by mouth 1 time a day as needed for Erectile Dysfunction. 20 Tablet 3    calcipotriene (DOVONEX) 0.005 % Cream APPLY 2 GRAMS EXTERNALLY TO THE AFFECTED AREA TWICE DAILY 120 g 6    Bempedoic Acid (BEMPEDOIC) 180 MG Tab Take 180 mg by mouth every day. 30 Tablet 11    VENTOLIN  (90 Base) MCG/ACT Aero Soln inhalation aerosol Inhale 2 Puffs every 6 hours as needed for Shortness of Breath. 18 g 5    SYNTHROID 125 MCG Tab TAKE 1 TABLET EVERY MORNING ON AN EMPTY STOMACH FOR HYPOTHYROIDISM 90 Tablet 3    fluticasone (FLOVENT HFA) 110 MCG/ACT Aerosol Inhale 1 Puff 2 times a day. 1 Each 6    benazepril (LOTENSIN) 10 MG Tab Take 1 Tablet by mouth every day. 90 Tablet 3    rosuvastatin (CRESTOR) 20 MG Tab TAKE 1 TABLET BY MOUTH EVERY EVENING 100 Tablet 3    B Complex Cap Take 1 Cap by mouth every day. 100 Cap 3    Cholecalciferol (VITAMIN " D) 2000 UNITS Cap Take 1 Each by mouth every day.      aspirin EC (ECOTRIN) 81 MG Tablet Delayed Response Take 81 mg by mouth every day.       No current facility-administered medications for this visit.         Physical Exam:   Gen:           Alert and oriented, No apparent distress. Mood and affect appropriate, normal interaction with examiner.  Eyes:          PERRL, EOM intact, sclere white, conjunctive moist.  Ears:          Not examined.   Hearing:     Grossly intact.  Nose:          Normal, no lesions or deformities.  Dentition:    Good dentition.  Oropharynx:   Tongue normal, posterior pharynx without erythema or exudate.  Neck:        Supple, trachea midline, no masses.  Respiratory Effort: No intercostal retractions or use of accessory muscles.   Lung Auscultation:      Clear to auscultation bilaterally; no rales, rhonchi or wheezing.  CV:            Regular rate and rhythm. No murmurs, rubs or gallops.  Abd:           Not examined.   Lymphadenopathy: Not examined.  Gait and Station: Normal.  Digits and Nails: No clubbing, cyanosis, petechiae, or nodes.   Cranial Nerves: II-XII grossly intact.  Skin:        No rashes, lesions or ulcers noted.               Ext:           No cyanosis or edema.      Assessment:  1. GONZALEZ (obstructive sleep apnea)  DME Mask and Supplies          Plan:  Using and benefiting from PAP therapy.  Compliance shows adequate use and control of GONZALEZ.  Order placed for mask and supplies which we good for 1 year.  Advise annual follow-up    Please note that this dictation was created using voice recognition software. I have made every reasonable attempt to correct obvious errors, but it is possible there are errors of grammar and possibly content that I did not discover before finalizing the note.

## 2025-04-16 ENCOUNTER — APPOINTMENT (OUTPATIENT)
Dept: URBAN - METROPOLITAN AREA CLINIC 35 | Facility: CLINIC | Age: 70
Setting detail: DERMATOLOGY
End: 2025-04-16

## 2025-04-16 DIAGNOSIS — Z41.9 ENCOUNTER FOR PROCEDURE FOR PURPOSES OTHER THAN REMEDYING HEALTH STATE, UNSPECIFIED: ICD-10-CM

## 2025-04-16 PROCEDURE — ? FRAXEL RESTORE DUAL

## 2025-04-16 ASSESSMENT — LOCATION SIMPLE DESCRIPTION DERM: LOCATION SIMPLE: LEFT FOREHEAD

## 2025-04-16 ASSESSMENT — LOCATION ZONE DERM: LOCATION ZONE: FACE

## 2025-04-16 ASSESSMENT — LOCATION DETAILED DESCRIPTION DERM: LOCATION DETAILED: LEFT INFERIOR MEDIAL FOREHEAD

## 2025-04-16 NOTE — HPI: COSMETIC (LASER RESURFACING)
Have You Had Laser Resurfacing Before?: has had previous treatments
When Was Your Last Laser Resurfacing Treatment?: 02/05/2025

## 2025-04-16 NOTE — PROCEDURE: FRAXEL RESTORE DUAL
Topical Anesthesia?: 23% lidocaine, 7% tetracaine
Length Topical Anesthesia Applied (Optional): 60 minutes
Post-Care Instructions: I reviewed with the patient in detail post-care instructions. [employer] photo corrective mask applied and [employer] biocellulose mask provided.
Passes (Optional): 8
Actual Kj Used (Optional): 3.06
Treatment Number (Optional): 36
Consent: Written consent obtained, risks reviewed including but not limited to crusting, scabbing, blistering, scarring, darker or lighter pigmentary change, and/or incomplete removal.
Detail Level: Zone
Energy In Mj (Optional): 20
Price (Use Numbers Only, No Special Characters Or $): 9642
External Cooling Fan Speed: 9

## 2025-04-28 DIAGNOSIS — L40.9 PSORIASIS: ICD-10-CM

## 2025-04-28 RX ORDER — CALCIPOTRIENE 50 UG/G
CREAM TOPICAL
Qty: 120 G | Refills: 6 | Status: SHIPPED | OUTPATIENT
Start: 2025-04-28

## 2025-04-30 ENCOUNTER — OFFICE VISIT (OUTPATIENT)
Dept: INTERNAL MEDICINE | Facility: IMAGING CENTER | Age: 70
End: 2025-04-30
Payer: MEDICARE

## 2025-04-30 VITALS
TEMPERATURE: 98.3 F | OXYGEN SATURATION: 93 % | DIASTOLIC BLOOD PRESSURE: 60 MMHG | BODY MASS INDEX: 25.48 KG/M2 | HEIGHT: 69 IN | WEIGHT: 172 LBS | RESPIRATION RATE: 12 BRPM | HEART RATE: 93 BPM | SYSTOLIC BLOOD PRESSURE: 104 MMHG

## 2025-04-30 DIAGNOSIS — R93.1 AGATSTON CAC SCORE 100-199: ICD-10-CM

## 2025-04-30 DIAGNOSIS — E03.9 HYPOTHYROIDISM (ACQUIRED): ICD-10-CM

## 2025-04-30 DIAGNOSIS — L40.9 PSORIASIS: ICD-10-CM

## 2025-04-30 DIAGNOSIS — C61 PROSTATE CANCER (HCC): ICD-10-CM

## 2025-04-30 DIAGNOSIS — Z00.00 MEDICARE ANNUAL WELLNESS VISIT, SUBSEQUENT: ICD-10-CM

## 2025-04-30 DIAGNOSIS — E78.00 HYPERCHOLESTEROLEMIA: ICD-10-CM

## 2025-04-30 DIAGNOSIS — E03.9 ACQUIRED HYPOTHYROIDISM: ICD-10-CM

## 2025-04-30 DIAGNOSIS — J45.20 MILD INTERMITTENT ASTHMA WITHOUT COMPLICATION: ICD-10-CM

## 2025-04-30 DIAGNOSIS — E78.2 MIXED HYPERLIPIDEMIA WITH APOLIPOPROTEIN E3 VARIANT: ICD-10-CM

## 2025-04-30 DIAGNOSIS — I10 HYPERTENSION, UNSPECIFIED TYPE: ICD-10-CM

## 2025-04-30 DIAGNOSIS — Z86.73 HISTORY OF STROKE: ICD-10-CM

## 2025-04-30 DIAGNOSIS — E78.41 ELEVATED LP(A): ICD-10-CM

## 2025-04-30 DIAGNOSIS — I70.0 ABDOMINAL AORTIC ATHEROSCLEROSIS (HCC): ICD-10-CM

## 2025-04-30 DIAGNOSIS — G47.33 OBSTRUCTIVE SLEEP APNEA: ICD-10-CM

## 2025-04-30 ASSESSMENT — FIBROSIS 4 INDEX: FIB4 SCORE: 1.71

## 2025-04-30 ASSESSMENT — ENCOUNTER SYMPTOMS: GENERAL WELL-BEING: GOOD

## 2025-04-30 ASSESSMENT — PATIENT HEALTH QUESTIONNAIRE - PHQ9: CLINICAL INTERPRETATION OF PHQ2 SCORE: 0

## 2025-04-30 ASSESSMENT — ACTIVITIES OF DAILY LIVING (ADL): BATHING_REQUIRES_ASSISTANCE: 0

## 2025-04-30 NOTE — ASSESSMENT & PLAN NOTE
Patient diagnosed with prostate cancer 2024.  He underwent radical prostatectomy in January 2025.  Postsurgical erectile issues but no incontinence.  Patient is followed closely by urology.

## 2025-04-30 NOTE — ASSESSMENT & PLAN NOTE
Vertebral Artery Dissection in 1997.  Initial symptoms were headache and dizziness.  He had complete resolution of symptoms with no sequelae.  He remains on aspirin and statin.

## 2025-04-30 NOTE — ASSESSMENT & PLAN NOTE
Clinically euthyroid.  Last thyroid function test were in October 2024 and were normal.  He remains on 125 mcg daily.

## 2025-04-30 NOTE — ASSESSMENT & PLAN NOTE
Stable/improved.  Patient remains on combination rosuvastatin and bempedoic acid.  His last LDL cholesterol was 63.  He is followed by Dr. Bloch

## 2025-05-01 DIAGNOSIS — E78.2 MIXED HYPERLIPIDEMIA WITH APOLIPOPROTEIN E3 VARIANT: ICD-10-CM

## 2025-05-01 RX ORDER — ROSUVASTATIN CALCIUM 20 MG/1
20 TABLET, COATED ORAL EVERY EVENING
Qty: 100 TABLET | Refills: 3 | Status: SHIPPED | OUTPATIENT
Start: 2025-05-01

## 2025-05-01 NOTE — PROGRESS NOTES
70 y.o. male presents for the following:      Patient comes in for annual health risk assessment, physical and review of laboratory. He considers himself in excellent health.  He exercises regularly.  No depression.  No balance issues.  No cognitive issues.    Patient has a history of prostate cancer.  This was diagnosed in 2024.  He underwent prostatectomy in 2025.  His last PSA was 0.02.  He is followed closely by urology.    Patient has history of coronary disease based on positive cardiac calcium test.  He is asymptomatic.  He exercises regularly and vigorously without chest pain, angina or similar.  Lipids are near goal.  Blood pressure is at goal.  He takes an aspirin daily.  He is followed by vascular medicine.    Lipids are near goal.  He is now on combination bempedoic acid and statin.  He is due for follow-up labs.  He also has a history of elevated LP(a)    Thyroid issues are stable on 125 mcg supplementation.  Clinically euthyroid.  Normal thyroid function test October 2024.    Sleep apnea stable with CPAP.  Patient reports AHI is <2.  He is followed by pulmonology.  His last evaluation was in April 2025.    Asthma has been more frequent.  He is using rescue inhaler 3 times weekly.  He has Flovent which he uses infrequently.  He has some exercise-induced symptoms also.    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  His symptoms at that time were headache and balance issues.  He has had no residual symptoms.    Patient has had increase in family stress related to relationship with son and he is wife who is his stepmother.    We also discussed his psoriasis.  He has minimal disease.  He treats with topical vitamin D ointment with good results.    Annual Wellness Visit/Health Risk Assessment:    Past medical:  Past Medical History:   Diagnosis Date    ASTHMA     mild intermittent    Chickenpox     Family history of hypertension     History of stroke     HTN (hypertension)  "2011    Hyperlipidemia     Hypothyroid 07/01/2016    Obstructive sleep apnea 6/13/2022    Psoriasis     Stroke (HCC) 1997    Vertebral artery disection       Past surgical:  Past Surgical History:   Procedure Laterality Date    OTHER  age 12     nasal polyps       Family history: relating to possible risk factors for your patient  Family History   Problem Relation Age of Onset    Hypertension Mother     Other Father 45        Viral encephalitis    Sleep Apnea Neg Hx        Current Providers (including home care/DME’s):   Hereditary Cancer, Medication and Heart Test: No Mutations ID'd (2018)  [id number]  \"Diaz\"    Patient Care Team:  Morteza Steen M.D. as PCP - General (Internal Medicine)  [facility] (DME Supplier)  AMILCAR Arias R.N.      Medications:   Current Outpatient Medications Ordered in Epic   Medication Sig Dispense Refill    calcipotriene (DOVONEX) 0.005 % Cream APPLY 2 GRAMS EXTERNALLY TO THE AFFECTED AREA TWICE DAILY 120 g 6    tadalafil 20 MG tablet Take 1 Tablet by mouth 1 time a day as needed for Erectile Dysfunction. 20 Tablet 3    Bempedoic Acid (BEMPEDOIC) 180 MG Tab Take 180 mg by mouth every day. 30 Tablet 11    VENTOLIN  (90 Base) MCG/ACT Aero Soln inhalation aerosol Inhale 2 Puffs every 6 hours as needed for Shortness of Breath. 18 g 5    SYNTHROID 125 MCG Tab TAKE 1 TABLET EVERY MORNING ON AN EMPTY STOMACH FOR HYPOTHYROIDISM 90 Tablet 3    fluticasone (FLOVENT HFA) 110 MCG/ACT Aerosol Inhale 1 Puff 2 times a day. 1 Each 6    benazepril (LOTENSIN) 10 MG Tab Take 1 Tablet by mouth every day. 90 Tablet 3    rosuvastatin (CRESTOR) 20 MG Tab TAKE 1 TABLET BY MOUTH EVERY EVENING 100 Tablet 3    B Complex Cap Take 1 Cap by mouth every day. 100 Cap 3    Cholecalciferol (VITAMIN D) 2000 UNITS Cap Take 1 Each by mouth every day.      aspirin EC (ECOTRIN) 81 MG Tablet Delayed Response Take 81 mg by mouth every day.       No current [facility]-ordered facility-administered medications " on file.       Supplements (calcium/vitamins): if not lisited in medications    Chief Complaint   Patient presents with    Medicare Annual Wellness         HPI:  Adal Dobbins is a 70 y.o. here for Medicare Annual Wellness Visit     Patient Active Problem List    Diagnosis Date Noted    Prostate cancer (HCC) 04/30/2025    Dyslipidemia 11/07/2024    Obstructive sleep apnea, mild (2022 polysomnography) 06/13/2022    Agatston CAC score, >400 (April 2022 total 587.1) 04/05/2022    BPH with obstruction/lower urinary tract symptoms, nocturia 02/05/2021    Elevated Lp(a) 03/04/2019    RBBB 11/16/2017    Right Liver hemangiomas per Ultrasound 11/16/2017    Mild Chronic inactive gastritis without bleeding (per 2017 EGD) 10/09/2017    Colon polyp (per 2017 Colonoscopy, negative for Adenomatous change or malignancy) 10/09/2017    Abdominal aortic atherosclerosis (HCC) 04/19/2017    Atherosclerosis of both carotid arteries, mild (US done April 2022) 04/19/2017    Agatston CAC score 100-199 02/27/2017    Hypertension 02/27/2017    Vitamin D deficiency 02/27/2017    Hypothyroid 07/01/2016    Erectile dysfunction 09/18/2014    Elevated CPK 09/18/2014    Mixed hyperlipidemia with apolipoprotein E3 variant 10/09/2013    Asthma     Psoriasis     Family history of hypertension     History of stroke from Vertebral Artery Dissection        Current Outpatient Medications   Medication Sig Dispense Refill    calcipotriene (DOVONEX) 0.005 % Cream APPLY 2 GRAMS EXTERNALLY TO THE AFFECTED AREA TWICE DAILY 120 g 6    tadalafil 20 MG tablet Take 1 Tablet by mouth 1 time a day as needed for Erectile Dysfunction. 20 Tablet 3    Bempedoic Acid (BEMPEDOIC) 180 MG Tab Take 180 mg by mouth every day. 30 Tablet 11    VENTOLIN  (90 Base) MCG/ACT Aero Soln inhalation aerosol Inhale 2 Puffs every 6 hours as needed for Shortness of Breath. 18 g 5    SYNTHROID 125 MCG Tab TAKE 1 TABLET EVERY MORNING ON AN EMPTY STOMACH FOR HYPOTHYROIDISM 90  "Tablet 3    fluticasone (FLOVENT HFA) 110 MCG/ACT Aerosol Inhale 1 Puff 2 times a day. 1 Each 6    benazepril (LOTENSIN) 10 MG Tab Take 1 Tablet by mouth every day. 90 Tablet 3    rosuvastatin (CRESTOR) 20 MG Tab TAKE 1 TABLET BY MOUTH EVERY EVENING 100 Tablet 3    B Complex Cap Take 1 Cap by mouth every day. 100 Cap 3    Cholecalciferol (VITAMIN D) 2000 UNITS Cap Take 1 Each by mouth every day.      aspirin EC (ECOTRIN) 81 MG Tablet Delayed Response Take 81 mg by mouth every day.       No current facility-administered medications for this visit.            Current supplements as per medication list.       Allergies: Patient has no known allergies.    Current social contact/activities:  Social with friends and family.     He  reports that he has been smoking cigars. He has never used smokeless tobacco. He reports current alcohol use of about 8.4 oz of alcohol per week. He reports that he does not use drugs.  Ready to quit: Not Answered  Counseling given: Not Answered  Tobacco comments: 1 cigar per week        DPA/Advanced Directive:  Completed       ROS:    Gait: Uses :None  Ostomy: No  Other tubes: no   Amputations: no   Chronic oxygen use: no   Last eye exam: < 1 year   : denies and incontinence.       Screening:  Hereditary Cancer, Medication and Heart Test: No Mutations ID'd (2018)  RBBB  \"Diaz\"    Depression Screening  Little interest or pleasure in doing things?  0 - not at all  Feeling down, depressed , or hopeless? 0 - not at all  Patient Health Questionnaire Score: 0     If depressive symptoms identified deferred to follow up visit unless specifically addressed in assessment and plan.    Interpretation of PHQ-9 Total Score   Score Severity   1-4 No Depression   5-9 Mild Depression   10-14 Moderate Depression   15-19 Moderately Severe Depression   20-27 Severe Depression    Screening for Cognitive Impairment  Do you or any of your friends or family members have any concern about your memory? No  Three " Minute Recall (Village, Kitchen, Baby) 3/3    Noam clock face with all 12 numbers and set the hands to show 10 minutes past 11.  Yes    Cognitive concerns identified deferred for follow up unless specifically addressed in assessment and plan.    Fall Risk Assessment  Has the patient had two or more falls in the last year or any fall with injury in the last year?  No    Safety Assessment  Do you always wear your seatbelt?  Yes  Any changes to home needed to function safely? No  Difficulty hearing.  No  Patient counseled about all safety risks that were identified.    Functional Assessment ADLs  Are there any barriers preventing you from cooking for yourself or meeting nutritional needs?  No.    Are there any barriers preventing you from driving safely or obtaining transportation?  No.    Are there any barriers preventing you from using a telephone or calling for help?  No    Are there any barriers preventing you from shopping?  No.    Are there any barriers preventing you from taking care of your own finances?  No    Are there any barriers preventing you from managing your medications?  No    Are there any barriers preventing you from showering, bathing or dressing yourself? No    Are there any barriers preventing you from doing housework or laundry? No  Are there any barriers preventing you from using the toilet?No  Are you currently engaging in any exercise or physical activity?  Yes.      Self-Assessment of Health  What is your perception of your health? Good    Do you sleep more than six hours a night? Yes    In the past 7 days, how much did pain keep you from doing your normal work? None    Do you spend quality time with family or friends (virtually or in person)? Yes    Do you usually eat a heart healthy diet that constists of a variety of fruits, vegetables, whole grains and fiber? Yes    Do you eat foods high in fat and/or Fast Food more than three times per week? No    How concerned are you that your medical  conditions are not being well managed? Not at all    Are you worried that in the next 2 months, you may not have stable housing that you own, rent, or stay in as part of a household? Choose not to answer      Advance Care Planning  Do you have an Advance Directive, Living Will, Durable Power of , or POLST?                   Health Maintenance Summary            Current Care Gaps       COVID-19 Vaccine (5 - 2024-25 season) Overdue since 9/1/2024 05/09/2022  Imm Admin: PFIZER CAT CAP SARS-COV-2 VACCINATION (12+)    05/09/2022  Imm Admin: PFIZER PURPLE CAP SARS-COV-2 VACCINATION (12+)    01/10/2021  Imm Admin: PFIZER PURPLE CAP SARS-COV-2 VACCINATION (12+)    12/20/2020  Imm Admin: PFIZER PURPLE CAP SARS-COV-2 VACCINATION (12+)                      Needs Review       Hepatitis C Screening  Tentatively Complete      12/06/2018  Hepatitis C Antibody component of HEP C VIRUS ANTIBODY              Colorectal Cancer Screening (Colonoscopy - Every 10 Years) Tentatively due on 9/21/2027 09/21/2017  REFERRAL TO GI FOR COLONOSCOPY                      Upcoming       IMM DTaP/Tdap/Td Vaccine (3 - Td or Tdap) Next due on 2/22/2026 02/22/2016  Imm Admin: Tdap Vaccine    05/01/2012  Imm Admin: Tdap Vaccine    08/05/2005  Imm Admin: TD Vaccine              Annual Wellness Visit (Yearly) Next due on 4/30/2026 04/30/2025  Visit Dx: Medicare annual wellness visit, subsequent    04/29/2024  Visit Dx: Medicare annual wellness visit, subsequent    04/12/2023  Done                      Completed or No Longer Recommended       Influenza Vaccine (Series Information) Completed      10/04/2024  Imm Admin: Influenza high-dose trivalent (PF)    10/30/2023  Imm Admin: Influenza Vaccine Adult HD    10/25/2022  Imm Admin: Influenza Vaccine Adult HD    09/29/2021  Imm Admin: Influenza Vaccine Quad Inj (Pf)    02/05/2021  Imm Admin: Influenza Vaccine Quad Inj (Pf)     Only the first 5 history entries have been loaded,  but more history exists.            Zoster (Shingles) Vaccines (Series Information) Completed      08/11/2020  Imm Admin: Zoster Vaccine Recombinant (RZV) (SHINGRIX)    01/31/2020  Imm Admin: Zoster Vaccine Recombinant (RZV) (SHINGRIX)    09/13/2013  Imm Admin: Zoster Vaccine Live (ZVL) (Zostavax) - HISTORICAL DATA              Abdominal Aortic Aneurysm (AAA) Screening  Completed      04/05/2022  Done - [employer] [facility]    10/24/2017  US-ABDOMEN COMPLETE SURVEY              Pneumococcal Vaccine: 50+ Years (Series Information) Completed      09/23/2022  Imm Admin: Pneumococcal Conjugate Vaccine (PCV20)              HPV Vaccines (Series Information) Aged Out      No completion history exists for this topic.              Polio Vaccine (Inactivated Polio) (Series Information) Aged Out     No completion history exists for this topic.              Meningococcal Immunization (Series Information) Aged Out     No completion history exists for this topic.              Hepatitis A Vaccine (Hep A)  Discontinued     No completion history exists for this topic.              Hepatitis B Vaccine (Hep B)  Discontinued     No completion history exists for this topic.                            Patient Care Team:  Morteza Steen M.D. as PCP - General (Internal Medicine)  [facility] (DME Supplier)  AMILCAR Arias R.N.        Social History     Tobacco Use    Smoking status: Light Smoker     Types: Cigars    Smokeless tobacco: Never    Tobacco comments:     1 cigar per week   Vaping Use    Vaping status: Never Used   Substance Use Topics    Alcohol use: Yes     Alcohol/week: 8.4 oz     Types: 14 Standard drinks or equivalent per week     Comment: 2-3 drinks a week    Drug use: No     Family History   Problem Relation Age of Onset    Hypertension Mother     Other Father 45        Viral encephalitis    Sleep Apnea Neg Hx      He  has a past medical history of ASTHMA, Chickenpox, Family history of hypertension, History  "of stroke, HTN (hypertension) (2011), Hyperlipidemia, Hypothyroid (07/01/2016), Obstructive sleep apnea (6/13/2022), Psoriasis, and Stroke (HCC) (1997).   Past Surgical History:   Procedure Laterality Date    OTHER  age 12     nasal polyps       Exam:     /60 (BP Location: Left arm, Patient Position: Sitting, BP Cuff Size: Adult)   Pulse 93   Temp 36.8 °C (98.3 °F) (Temporal)   Resp 12   Ht 1.753 m (5' 9\")   Wt 78 kg (172 lb)   SpO2 93%  Body mass index is 25.4 kg/m².    Hearing good.    Dentition good  Alert, oriented in no acute distress.  Eye contact is good, speech goal directed, affect calm  General: Well-appearing and in no distress.  Physically fit.  Muscular.  HEENT: Grossly normal. Oral cavity is pink and moist.  Ears, canal and tympanic membrane are normal.   Neck: Supple without JVD or bruit.  Pulmonary: Clear with good breath sounds. Normal effort.  Cardiovascular: Regular. Carotid and radial pulses are intact.  Abdomen: Soft, nontender, nondistended.  Neurologic: Grossly nonfocal  Skin: No obvious lesions.    Assessment and Plan. The following treatment and monitoring plan is recommended:    1. Medicare annual wellness visit, subsequent        2. Prostate cancer (HCC)        3. Mild intermittent asthma without complication        4. Hypercholesterolemia        5. Hypothyroidism (acquired)        6. Abdominal aortic atherosclerosis (HCC)        7. Agatston CAC score 100-199        8. Elevated Lp(a)        9. History of stroke from Vertebral Artery Dissection        10. Obstructive sleep apnea, mild (2022 polysomnography)        11. Psoriasis        12. Mixed hyperlipidemia with apolipoprotein E3 variant        13. Acquired hypothyroidism        14. Hypertension, unspecified type            70-year-old male remains in excellent physical and cognitive health.  Post radical prostatectomy.  PSA is followed by urology.  Patient is followed by vascular medicine for history of atherosclerosis, " hypertension and hyperlipidemia.  He is on additional therapy for lipid lowering with a goal of 55.  He appears to be tolerating without side effect.  He had been on ezetimibe but this seemed to cause elevation in liver function test.  Thyroid issues are stable.  No change in treatment.  Discussed his asthma.  Recommended they start using Flovent 1 inhalation daily.  He can continue with albuterol as needed.    Discussed immunizations.  Discussed possible establishing with therapist for he and his wife regarding issues with relationship between his wife and his son which is her stepson.  Offered referral but he believes his wife would like to get recommendation from her physician    Services suggested: No services required at this time  Health Care Screening: Age-appropriate preventive services Medicare covers discussed today and ordered if indicated.  Referrals offered: Community-based lifestyle interventions to reduce health risks and promote self-management and wellness, fall prevention, nutrition, physical activity, tobacco-use cessation, weight loss, and mental health services as per orders if indicated.    Discussion today about general wellness and lifestyle habits:    Prevent falls and reduce trip hazards; Cautioned about securing or removing rugs.  Have a working fire alarm and carbon monoxide detector;   Engage in regular physical activity and social activities       Follow-up: 1 year for HRA

## 2025-05-14 ENCOUNTER — NON-PROVIDER VISIT (OUTPATIENT)
Dept: INTERNAL MEDICINE | Facility: IMAGING CENTER | Age: 70
End: 2025-05-14
Payer: MEDICARE

## 2025-05-14 ENCOUNTER — HOSPITAL ENCOUNTER (OUTPATIENT)
Facility: MEDICAL CENTER | Age: 70
End: 2025-05-14
Attending: UROLOGY
Payer: MEDICARE

## 2025-05-14 DIAGNOSIS — Z01.89 ENCOUNTER FOR ROUTINE LABORATORY TESTING: Primary | ICD-10-CM

## 2025-05-14 LAB — PSA SERPL DL<=0.01 NG/ML-MCNC: <0.02 NG/ML (ref 0–4)

## 2025-05-14 PROCEDURE — 84153 ASSAY OF PSA TOTAL: CPT

## 2025-06-09 RX ORDER — BENAZEPRIL HYDROCHLORIDE 10 MG/1
10 TABLET ORAL DAILY
Qty: 90 TABLET | Refills: 3 | Status: SHIPPED | OUTPATIENT
Start: 2025-06-09

## 2025-06-17 DIAGNOSIS — E78.2 MIXED HYPERLIPIDEMIA WITH APOLIPOPROTEIN E3 VARIANT: ICD-10-CM

## 2025-06-17 RX ORDER — ROSUVASTATIN CALCIUM 20 MG/1
20 TABLET, COATED ORAL EVERY EVENING
Qty: 100 TABLET | Refills: 3 | Status: SHIPPED | OUTPATIENT
Start: 2025-06-17

## 2025-06-18 ENCOUNTER — APPOINTMENT (OUTPATIENT)
Dept: URBAN - METROPOLITAN AREA CLINIC 35 | Facility: CLINIC | Age: 70
Setting detail: DERMATOLOGY
End: 2025-06-18

## 2025-06-18 DIAGNOSIS — Z41.9 ENCOUNTER FOR PROCEDURE FOR PURPOSES OTHER THAN REMEDYING HEALTH STATE, UNSPECIFIED: ICD-10-CM

## 2025-06-18 PROCEDURE — ? FRAXEL RESTORE DUAL

## 2025-06-18 ASSESSMENT — LOCATION DETAILED DESCRIPTION DERM: LOCATION DETAILED: LEFT INFERIOR MEDIAL FOREHEAD

## 2025-06-18 ASSESSMENT — LOCATION ZONE DERM: LOCATION ZONE: FACE

## 2025-06-18 ASSESSMENT — LOCATION SIMPLE DESCRIPTION DERM: LOCATION SIMPLE: LEFT FOREHEAD

## 2025-06-24 ENCOUNTER — HOSPITAL ENCOUNTER (OUTPATIENT)
Facility: MEDICAL CENTER | Age: 70
End: 2025-06-24
Attending: INTERNAL MEDICINE
Payer: MEDICARE

## 2025-06-24 ENCOUNTER — NON-PROVIDER VISIT (OUTPATIENT)
Dept: INTERNAL MEDICINE | Facility: IMAGING CENTER | Age: 70
End: 2025-06-24
Payer: MEDICARE

## 2025-06-24 DIAGNOSIS — C61 PROSTATE CANCER (HCC): ICD-10-CM

## 2025-06-24 DIAGNOSIS — E03.9 ACQUIRED HYPOTHYROIDISM: ICD-10-CM

## 2025-06-24 DIAGNOSIS — E03.9 HYPOTHYROIDISM, UNSPECIFIED TYPE: ICD-10-CM

## 2025-06-24 DIAGNOSIS — E78.2 MIXED HYPERLIPIDEMIA WITH APOLIPOPROTEIN E3 VARIANT: ICD-10-CM

## 2025-06-24 DIAGNOSIS — I10 HYPERTENSION, UNSPECIFIED TYPE: ICD-10-CM

## 2025-06-24 DIAGNOSIS — Z01.89 ENCOUNTER FOR ROUTINE LABORATORY TESTING: Primary | ICD-10-CM

## 2025-06-24 DIAGNOSIS — J45.20 MILD INTERMITTENT ASTHMA WITHOUT COMPLICATION: ICD-10-CM

## 2025-06-24 LAB
ALBUMIN SERPL BCP-MCNC: 4.6 G/DL (ref 3.2–4.9)
ALBUMIN/GLOB SERPL: 1.7 G/DL
ALP SERPL-CCNC: 39 U/L (ref 30–99)
ALT SERPL-CCNC: 30 U/L (ref 2–50)
ANION GAP SERPL CALC-SCNC: 11 MMOL/L (ref 7–16)
AST SERPL-CCNC: 40 U/L (ref 12–45)
BASOPHILS # BLD AUTO: 1.5 % (ref 0–1.8)
BASOPHILS # BLD: 0.07 K/UL (ref 0–0.12)
BILIRUB SERPL-MCNC: 0.6 MG/DL (ref 0.1–1.5)
BUN SERPL-MCNC: 30 MG/DL (ref 8–22)
CALCIUM ALBUM COR SERPL-MCNC: 9.1 MG/DL (ref 8.5–10.5)
CALCIUM SERPL-MCNC: 9.6 MG/DL (ref 8.5–10.5)
CHLORIDE SERPL-SCNC: 105 MMOL/L (ref 96–112)
CHOLEST SERPL-MCNC: 159 MG/DL (ref 100–199)
CO2 SERPL-SCNC: 24 MMOL/L (ref 20–33)
CREAT SERPL-MCNC: 1.11 MG/DL (ref 0.5–1.4)
CREAT UR-MCNC: 108 MG/DL
CRP SERPL HS-MCNC: 0.2 MG/L (ref 0–3)
EOSINOPHIL # BLD AUTO: 0.24 K/UL (ref 0–0.51)
EOSINOPHIL NFR BLD: 5.2 % (ref 0–6.9)
ERYTHROCYTE [DISTWIDTH] IN BLOOD BY AUTOMATED COUNT: 42.9 FL (ref 35.9–50)
GFR SERPLBLD CREATININE-BSD FMLA CKD-EPI: 72 ML/MIN/1.73 M 2
GLOBULIN SER CALC-MCNC: 2.7 G/DL (ref 1.9–3.5)
GLUCOSE SERPL-MCNC: 77 MG/DL (ref 65–99)
HCT VFR BLD AUTO: 47.7 % (ref 42–52)
HDLC SERPL-MCNC: 75 MG/DL
HGB BLD-MCNC: 15.8 G/DL (ref 14–18)
IMM GRANULOCYTES # BLD AUTO: 0 K/UL (ref 0–0.11)
IMM GRANULOCYTES NFR BLD AUTO: 0 % (ref 0–0.9)
LDLC SERPL CALC-MCNC: 70 MG/DL
LYMPHOCYTES # BLD AUTO: 1.76 K/UL (ref 1–4.8)
LYMPHOCYTES NFR BLD: 37.8 % (ref 22–41)
MCH RBC QN AUTO: 29.4 PG (ref 27–33)
MCHC RBC AUTO-ENTMCNC: 33.1 G/DL (ref 32.3–36.5)
MCV RBC AUTO: 88.8 FL (ref 81.4–97.8)
MICROALBUMIN UR-MCNC: <1.2 MG/DL
MICROALBUMIN/CREAT UR: NORMAL MG/G (ref 0–30)
MONOCYTES # BLD AUTO: 0.61 K/UL (ref 0–0.85)
MONOCYTES NFR BLD AUTO: 13.1 % (ref 0–13.4)
NEUTROPHILS # BLD AUTO: 1.98 K/UL (ref 1.82–7.42)
NEUTROPHILS NFR BLD: 42.4 % (ref 44–72)
NRBC # BLD AUTO: 0 K/UL
NRBC BLD-RTO: 0 /100 WBC (ref 0–0.2)
PLATELET # BLD AUTO: 232 K/UL (ref 164–446)
PMV BLD AUTO: 11.9 FL (ref 9–12.9)
POTASSIUM SERPL-SCNC: 4.5 MMOL/L (ref 3.6–5.5)
PROT SERPL-MCNC: 7.3 G/DL (ref 6–8.2)
RBC # BLD AUTO: 5.37 M/UL (ref 4.7–6.1)
SODIUM SERPL-SCNC: 140 MMOL/L (ref 135–145)
T4 FREE SERPL-MCNC: 1.4 NG/DL (ref 0.93–1.7)
T4 SERPL-MCNC: 7.6 UG/DL (ref 4–12)
TRIGL SERPL-MCNC: 69 MG/DL (ref 0–149)
TSH SERPL-ACNC: 4.15 UIU/ML (ref 0.38–5.33)
WBC # BLD AUTO: 4.7 K/UL (ref 4.8–10.8)

## 2025-06-24 PROCEDURE — 84443 ASSAY THYROID STIM HORMONE: CPT

## 2025-06-24 PROCEDURE — 83704 LIPOPROTEIN BLD QUAN PART: CPT

## 2025-06-24 PROCEDURE — 82172 ASSAY OF APOLIPOPROTEIN: CPT

## 2025-06-24 PROCEDURE — 86141 C-REACTIVE PROTEIN HS: CPT

## 2025-06-24 PROCEDURE — 80061 LIPID PANEL: CPT

## 2025-06-24 PROCEDURE — 84436 ASSAY OF TOTAL THYROXINE: CPT

## 2025-06-24 PROCEDURE — 82043 UR ALBUMIN QUANTITATIVE: CPT

## 2025-06-24 PROCEDURE — 85025 COMPLETE CBC W/AUTO DIFF WBC: CPT

## 2025-06-24 PROCEDURE — 80053 COMPREHEN METABOLIC PANEL: CPT

## 2025-06-24 PROCEDURE — 82570 ASSAY OF URINE CREATININE: CPT

## 2025-06-24 PROCEDURE — 84439 ASSAY OF FREE THYROXINE: CPT

## 2025-06-27 LAB — APO B100 SERPL-MCNC: 71 MG/DL (ref 66–133)

## 2025-06-28 LAB
CHOLEST SERPL-MCNC: 166 MG/DL
HDL PARTICAL NO Q4363: >41 UMOL/L
HDL SIZE Q4361: 9.7 NM
HDLC SERPL-MCNC: 75 MG/DL (ref 40–59)
HLD.LARGE SERPL-SCNC: 12.6 UMOL/L
L VLDL PART NO Q4357: <1.5 NMOL/L
LDL SERPL QN: 20.9 NM
LDL SERPL-SCNC: 1063 NMOL/L
LDL SMALL SERPL-SCNC: 296 NMOL/L
LDLC SERPL CALC-MCNC: 76 MG/DL
PATHOLOGY STUDY: ABNORMAL
TRIGL SERPL-MCNC: 75 MG/DL (ref 30–149)
VLDL SIZE Q4362: 45.4 NM

## 2025-06-30 ENCOUNTER — TELEPHONE (OUTPATIENT)
Dept: VASCULAR LAB | Facility: MEDICAL CENTER | Age: 70
End: 2025-06-30
Payer: MEDICARE

## 2025-06-30 DIAGNOSIS — I10 HYPERTENSION, UNSPECIFIED TYPE: ICD-10-CM

## 2025-06-30 DIAGNOSIS — R93.1 AGATSTON CAC SCORE, >400: ICD-10-CM

## 2025-06-30 DIAGNOSIS — R01.1 HEART MURMUR: ICD-10-CM

## 2025-06-30 DIAGNOSIS — E78.41 ELEVATED LP(A): ICD-10-CM

## 2025-06-30 DIAGNOSIS — E03.9 HYPOTHYROIDISM, UNSPECIFIED TYPE: ICD-10-CM

## 2025-06-30 DIAGNOSIS — E78.2 MIXED HYPERLIPIDEMIA WITH APOLIPOPROTEIN E3 VARIANT: Primary | ICD-10-CM

## 2025-07-07 DIAGNOSIS — J45.20 MILD INTERMITTENT ASTHMA WITHOUT COMPLICATION: ICD-10-CM

## 2025-07-07 RX ORDER — FLUTICASONE PROPIONATE 110 UG/1
1 AEROSOL, METERED RESPIRATORY (INHALATION) 2 TIMES DAILY
Qty: 1 EACH | Refills: 6 | Status: SHIPPED | OUTPATIENT
Start: 2025-07-07

## 2025-07-09 ENCOUNTER — APPOINTMENT (OUTPATIENT)
Dept: URBAN - METROPOLITAN AREA CLINIC 35 | Facility: CLINIC | Age: 70
Setting detail: DERMATOLOGY
End: 2025-07-09

## 2025-07-09 DIAGNOSIS — Z41.9 ENCOUNTER FOR PROCEDURE FOR PURPOSES OTHER THAN REMEDYING HEALTH STATE, UNSPECIFIED: ICD-10-CM

## 2025-07-09 PROCEDURE — ? ADDITIONAL NOTES

## 2025-07-09 PROCEDURE — ? BOTOX

## 2025-07-14 ENCOUNTER — OFFICE VISIT (OUTPATIENT)
Dept: VASCULAR LAB | Facility: MEDICAL CENTER | Age: 70
End: 2025-07-14
Attending: INTERNAL MEDICINE
Payer: MEDICARE

## 2025-07-14 VITALS
HEIGHT: 68 IN | HEART RATE: 65 BPM | WEIGHT: 170 LBS | DIASTOLIC BLOOD PRESSURE: 67 MMHG | SYSTOLIC BLOOD PRESSURE: 111 MMHG | BODY MASS INDEX: 25.76 KG/M2

## 2025-07-14 DIAGNOSIS — E78.2 MIXED HYPERLIPIDEMIA WITH APOLIPOPROTEIN E3 VARIANT: Primary | ICD-10-CM

## 2025-07-14 DIAGNOSIS — E78.41 ELEVATED LP(A): ICD-10-CM

## 2025-07-14 DIAGNOSIS — I10 HYPERTENSION, UNSPECIFIED TYPE: ICD-10-CM

## 2025-07-14 DIAGNOSIS — E03.9 HYPOTHYROIDISM, UNSPECIFIED TYPE: ICD-10-CM

## 2025-07-14 PROCEDURE — G2211 COMPLEX E/M VISIT ADD ON: HCPCS | Performed by: INTERNAL MEDICINE

## 2025-07-14 PROCEDURE — 99214 OFFICE O/P EST MOD 30 MIN: CPT | Performed by: INTERNAL MEDICINE

## 2025-07-14 PROCEDURE — 3078F DIAST BP <80 MM HG: CPT | Performed by: INTERNAL MEDICINE

## 2025-07-14 PROCEDURE — 3074F SYST BP LT 130 MM HG: CPT | Performed by: INTERNAL MEDICINE

## 2025-07-14 PROCEDURE — 99212 OFFICE O/P EST SF 10 MIN: CPT

## 2025-07-14 ASSESSMENT — FIBROSIS 4 INDEX: FIB4 SCORE: 2.14

## 2025-07-14 NOTE — PROGRESS NOTES
"[facility] - follow up  Visit  Date of Service: 07/14/25    Patient here for f/u of dyslipidemia    Subjective    HPI  History of ASCVD: No  Other Established (non-atherosclerotic) Vascular Disease, if Present: None  Age at Initial Diagnosis of Dyslipidemia: 50s    Current Prescription Lipid Lowering Medications - including dose:   Statin: Rosuvastatin 20 mg  Non-Statin: Bempedoic acid 180 mg a day  Current Lipid Lowering and Related Supplements:   Aspirin 81 mg a day  Vitamin D  Coq.10  Any Current Side Effects Potentially Related to Lipid Lowering therapy?   No  Current Adherence to Lipid Lowering Therapies   Complete  Previously Attempted Interventions for Lipids - including outcome  Statin: None    Outcome: N/A  Non-Statin: Ezetimibe   Outcome: modestly elevated LFTs  Any Previous History of Statin Intolerance?   No  Baseline Lipids Prior to Treatment:   Unknown or unavailable - only on treatment blood work available  Other Pertinent History:   Excellent exercise tolerance  No angina  Remains on same dose levothyroxine -good energy level  History of other CV risk factors:   He is down to 10 mg of benazepril a day  Has not been measuring his blood pressure at home- no lightheadedness  History of cigarette and cigar smoking - none recently  No fam history of premature ASCVD    FAMILY HISTORY: High cholesterol and high bp but no ascvd in first degree relatives on mothers side. Does not know fathers histoyr    SOCIAL HISTORY   Social History     Tobacco Use   Smoking Status Light Smoker    Types: Cigars   Smokeless Tobacco Never   Tobacco Comments    1 cigar per week     Change in weight: Has maintained his weight loss  Exercise habits: strenuous regular exercise program  Diet: med style - very consistent        Objective      Vitals:    07/14/25 1602   BP: 111/67   BP Location: Left arm   Patient Position: Sitting   BP Cuff Size: Adult   Pulse: 65   Weight: 77.1 kg (170 lb)   Height: 1.727 m (5' 8\")    "   Physical Exam  Vitals reviewed.   Constitutional:       General: He is not in acute distress.     Appearance: He is well-developed. He is not toxic-appearing or diaphoretic.   HENT:      Head: Normocephalic and atraumatic.   Eyes:      General: No scleral icterus.     Extraocular Movements: Extraocular movements intact.      Conjunctiva/sclera: Conjunctivae normal.   Neck:      Thyroid: No thyromegaly.      Vascular: No carotid bruit or JVD.   Cardiovascular:      Rate and Rhythm: Normal rate and regular rhythm.      Heart sounds: Murmur heard.      No friction rub. No gallop.   Pulmonary:      Effort: Pulmonary effort is normal. No respiratory distress.      Breath sounds: Normal breath sounds. No wheezing or rales.   Musculoskeletal:      Right lower leg: No edema.      Left lower leg: No edema.   Skin:     Coloration: Skin is not pale.   Neurological:      General: No focal deficit present.      Mental Status: He is alert and oriented to person, place, and time.      Cranial Nerves: No cranial nerve deficit.      Coordination: Coordination normal.      Gait: Gait normal.   Psychiatric:         Mood and Affect: Mood normal.         Behavior: Behavior normal.         DATA REVIEW:  Most Recent Lipid Panel:   Lab Results   Component Value Date    CHOLSTRLTOT 159 06/24/2025    CHOLSTRLTOT 166 06/24/2025    TRIGLYCERIDE 69 06/24/2025    TRIGLYCERIDE 75 06/24/2025    HDL 75 06/24/2025    HDL 75 (H) 06/24/2025    LDL 70 06/24/2025   LDL C -70  LDLP -1063    Lab Results   Component Value Date/Time    LIPOPROTA 23 07/29/2024 08:05 AM      Lab Results   Component Value Date/Time    APOB 71 06/24/2025 07:45 AM      Lab Results   Component Value Date/Time    CRPHIGHSEN 0.2 06/24/2025 07:45 AM      Other Pertinent Blood Work:   Lab Results   Component Value Date    SODIUM 140 06/24/2025    POTASSIUM 4.5 06/24/2025    CHLORIDE 105 06/24/2025    CO2 24 06/24/2025    ANION 11.0 06/24/2025    GLUCOSE 77 06/24/2025    BUN 30 (H)  06/24/2025    CREATININE 1.11 06/24/2025    CALCIUM 9.6 06/24/2025    ASTSGOT 40 06/24/2025    ALTSGPT 30 06/24/2025    ALKPHOSPHAT 39 06/24/2025    TBILIRUBIN 0.6 06/24/2025    ALBUMIN 4.6 06/24/2025    AGRATIO 1.7 06/24/2025    CRPHIGHSEN 0.2 06/24/2025    CREACTPROT <0.30 10/04/2024    LIPOPROTA 23 07/29/2024    TSHULTRASEN 4.150 06/24/2025       CAC 2014  LMA - 0.0   LCX - 0.0   LAD - 102.8   RCA - 0.0   PDA - 30.5  Calcium Score:  133.3     vasc screen and cimt 2017  Mild carotid plaque  No aaa  Normal bee  Mean cimt 0.719  [facility] age 62    Stress Echo 2018  Negative stress echocardiogram for ischemia with adequate stress achieved by   heart rate criteria.     vasc screen and cimt 2018  Mild carotid plaque  No aaa  Normal bee  Mean cimt 0.719  [facility] age 63    CAC score 2022  LMA - 0.0  LCX - 0.0  LAD - 469.2  RCA - 117.9  PDA - 587.1  Total Calcium Score: 587.1    vasc screen and cimt April 2022  Mild carotid plaque  No aaa  Normal bee  Mean cimt 0.758  [facility] age 67    MPI april 2022   No evidence of significant jeopardized viable myocardium or prior myocardial    infarction.   Normal left ventricular size, ejection fraction, and wall motion.    CIMT and vasc screen april 2023   Moderate plaque of the carotid bifurcation with velocities consistent with less than 50% stenosis of the internal carotid.    No aneurysm of the abdominal aorta.    ABIs are increased bilaterally consistent with vessel hardening, limiting evaluation.  Composite mean C IMT 0.736  Vascular age 64    CMT and vascular screen April 2024  Mild carotid plaque bilaterally without significant stenosis  Normal BEE  No AAA  Mean CIMT 0.832  Vascular age 79    Echo July 2024  Normal left ventricular systolic function.  The left ventricular ejection fraction is visually estimated to be 60%.  The right ventricle is normal in size and systolic function.  No significant valvular abnormalities.     CMT and vascular screen February 2025  Mild carotid plaque  bilaterally without significant stenosis  Normal BEE  No AAA  Mean CIMT 0. 781  Vascular age 69        ASSESSMENT AND PLAN  Patient Type, check all that apply:   Primary Prevention  Established Atherosclerotic Cardiovascular Disease (ASCVD)  Subclinical ASCVD as evidenced by his elevated coronary calcium score, elevated C IMT, and mild carotid atheroma seen on vascular screen.  As we previously discussed at length, coronary calcium scores are not meant to be followed serially and his worsening in coronary calcium score between 2014 and 2022 is as likely to be the result of calcification and stabilization of pre-existing plaque rather than progression of atherosclerosis.  CMT and vascular screen are a much better way to follow progression   Overall, I think picture is most consistent with the development of early atherosclerosis in his 40s and 50s with stabilization of existing plaque with better lifestyle and lipid-lowering therapy over the last 5 to 10 years  There is no evidence of ischemia on MPI and by symptoms so I do not think there is any reason to proceed with coronary CTA unless he develops symptoms.  This is an indication for aggressive medical management and we can follow his progression with C IMT and vascular screen serially.  I would not recommend he gets another coronary artery calcium score  Overall, his CIMT has shown very modest progression over the last 7 or 8 years  Other Established (non-atherosclerotic) Vascular Disease, if Present:  None  Evidence of Heterozygous Familial Hypercholesterolemia (FH):   No   ACC/AHA Indication for Statin Therapy, jose all that apply:  Baseline Calculated ASCVD risk >7.5%: Indication for Moderate intensity statin  Calculated Risk for ASCVD, if applicable    9.7 % - likely an underestimation given subclinical ascvd  Other Significant Risk Markers, if any, jose all that apply   Subclinical atherosclerosis on CAC scoring  Subclinical atherosclerosis on vascular  screen and C IMT -has had very modest progression of the past few years  Mildly elevated lipoprotein a  Favorable triglycerides, HDL, and LDL particle size  Favorable CRP   Treatment goals based on shared decision making  Using shared decision making we decided to treat to aggressively and target an LDL C less than 70, APO B less than 70, and LDL particle number at least less than 900 -  Has decent but not optimal control  Lifestyle Recommendations From Today’s Visit:    Continue regular exercise  Continue maintain weight  Continue very heart healthy diet low in saturated fat  Statin Therapy Recommendations from Today’s Visit:   -Continue rosuvastatin to 20 mg daily   Non-Statin Medications Recommendations from Today’s Visit:   Off ezetimibe due to modest increase in LFTs which resolved with d/c. Could consider another trial in future  -Continue nexletol 180 mg daily   Indication for PCSK9 Inhibitor, if applicable:  Not currently indicated -but could consider if his atherosclerosis or lipid panel worsens   Supplements Recommended at this visit:   Continue vitamin D and coq.10 to help with statin tolerability  Recommendations for Other Cardiovascular Risk Factors, jose all that apply:   #Hypertension -under excellent control both in the office and at home despite decreasing doses of benazepril.  Continue benazepril 10 mg daily.  Continue home blood pressure monitoring  #History of smoking -recommend continued avoidance of all tobacco products  #Antiplatelet therapy -reasonable to continue low-dose aspirin given his subclinical CAD and low bleeding risk  Other Issues:  # hypothyroidism -asymptomatic.  Seems under reasonable control per symptoms and TSH.  Continue current dose levothyroxine. Recheck TSH AND T4 with next blood work.  Otherwise defer further management to PCP  # Heart murmur -no significant valvular abnormalities on echo in 2024.  No further workup at this time    Future Studies: Repeat cimt and vascular  screen approximately every year  Blood Work Ordered At Today’s visit: NMR, apoB, lipid panel cmp, crp  Follow-Up: 6  months    Michael J Bloch, M.D.

## 2025-08-07 DIAGNOSIS — E03.9 HYPOTHYROIDISM (ACQUIRED): ICD-10-CM

## 2025-08-07 RX ORDER — LEVOTHYROXINE SODIUM 125 MCG
125 TABLET ORAL EVERY MORNING
Qty: 90 TABLET | Refills: 3 | Status: SHIPPED | OUTPATIENT
Start: 2025-08-07